# Patient Record
Sex: MALE | Race: WHITE | NOT HISPANIC OR LATINO | URBAN - METROPOLITAN AREA
[De-identification: names, ages, dates, MRNs, and addresses within clinical notes are randomized per-mention and may not be internally consistent; named-entity substitution may affect disease eponyms.]

---

## 2017-06-07 ENCOUNTER — INPATIENT (INPATIENT)
Facility: HOSPITAL | Age: 67
LOS: 8 days | Discharge: HOME | DRG: 300 | End: 2017-06-16
Attending: INTERNAL MEDICINE | Admitting: INTERNAL MEDICINE
Payer: MEDICARE

## 2017-06-07 VITALS — WEIGHT: 315 LBS

## 2017-06-07 DIAGNOSIS — R06.02 SHORTNESS OF BREATH: ICD-10-CM

## 2017-06-07 LAB
ALBUMIN SERPL ELPH-MCNC: 3.8 G/DL — SIGNIFICANT CHANGE UP (ref 3.3–5)
ALP SERPL-CCNC: 102 U/L — SIGNIFICANT CHANGE UP (ref 40–120)
ALT FLD-CCNC: 22 U/L RC — SIGNIFICANT CHANGE UP (ref 10–45)
ANION GAP SERPL CALC-SCNC: 16 MMOL/L — SIGNIFICANT CHANGE UP (ref 5–17)
APPEARANCE UR: CLEAR — SIGNIFICANT CHANGE UP
APTT BLD: 27.7 SEC — SIGNIFICANT CHANGE UP (ref 27.5–37.4)
AST SERPL-CCNC: 21 U/L — SIGNIFICANT CHANGE UP (ref 10–40)
BASE EXCESS BLDV CALC-SCNC: -0.9 MMOL/L — SIGNIFICANT CHANGE UP (ref -2–2)
BASOPHILS # BLD AUTO: 0 K/UL — SIGNIFICANT CHANGE UP (ref 0–0.2)
BASOPHILS NFR BLD AUTO: 0.1 % — SIGNIFICANT CHANGE UP (ref 0–2)
BILIRUB SERPL-MCNC: 0.2 MG/DL — SIGNIFICANT CHANGE UP (ref 0.2–1.2)
BILIRUB UR-MCNC: NEGATIVE — SIGNIFICANT CHANGE UP
BUN SERPL-MCNC: 23 MG/DL — SIGNIFICANT CHANGE UP (ref 7–23)
CA-I SERPL-SCNC: 1.2 MMOL/L — SIGNIFICANT CHANGE UP (ref 1.12–1.3)
CALCIUM SERPL-MCNC: 8.7 MG/DL — SIGNIFICANT CHANGE UP (ref 8.4–10.5)
CHLORIDE BLDV-SCNC: 107 MMOL/L — SIGNIFICANT CHANGE UP (ref 96–108)
CHLORIDE SERPL-SCNC: 103 MMOL/L — SIGNIFICANT CHANGE UP (ref 96–108)
CO2 BLDV-SCNC: 25 MMOL/L — SIGNIFICANT CHANGE UP (ref 22–30)
CO2 SERPL-SCNC: 22 MMOL/L — SIGNIFICANT CHANGE UP (ref 22–31)
COLOR SPEC: YELLOW — SIGNIFICANT CHANGE UP
COMMENT - URINE: SIGNIFICANT CHANGE UP
CREAT SERPL-MCNC: 0.97 MG/DL — SIGNIFICANT CHANGE UP (ref 0.5–1.3)
DIFF PNL FLD: ABNORMAL
EOSINOPHIL # BLD AUTO: 0.1 K/UL — SIGNIFICANT CHANGE UP (ref 0–0.5)
EOSINOPHIL NFR BLD AUTO: 1.1 % — SIGNIFICANT CHANGE UP (ref 0–6)
EPI CELLS # UR: SIGNIFICANT CHANGE UP /HPF
GAS PNL BLDV: 139 MMOL/L — SIGNIFICANT CHANGE UP (ref 136–145)
GAS PNL BLDV: SIGNIFICANT CHANGE UP
GAS PNL BLDV: SIGNIFICANT CHANGE UP
GLUCOSE BLDV-MCNC: 129 MG/DL — HIGH (ref 70–99)
GLUCOSE SERPL-MCNC: 129 MG/DL — HIGH (ref 70–99)
GLUCOSE UR QL: NEGATIVE — SIGNIFICANT CHANGE UP
HCO3 BLDV-SCNC: 24 MMOL/L — SIGNIFICANT CHANGE UP (ref 21–29)
HCT VFR BLD CALC: 38.6 % — LOW (ref 39–50)
HCT VFR BLDA CALC: 40 % — SIGNIFICANT CHANGE UP (ref 39–50)
HGB BLD CALC-MCNC: 13.2 G/DL — SIGNIFICANT CHANGE UP (ref 13–17)
HGB BLD-MCNC: 13.3 G/DL — SIGNIFICANT CHANGE UP (ref 13–17)
HYALINE CASTS # UR AUTO: ABNORMAL
INR BLD: 1.16 RATIO — SIGNIFICANT CHANGE UP (ref 0.88–1.16)
KETONES UR-MCNC: ABNORMAL
LACTATE BLDV-MCNC: 2.8 MMOL/L — HIGH (ref 0.7–2)
LEUKOCYTE ESTERASE UR-ACNC: ABNORMAL
LYMPHOCYTES # BLD AUTO: 1.7 K/UL — SIGNIFICANT CHANGE UP (ref 1–3.3)
LYMPHOCYTES # BLD AUTO: 17.5 % — SIGNIFICANT CHANGE UP (ref 13–44)
MCHC RBC-ENTMCNC: 34.4 GM/DL — SIGNIFICANT CHANGE UP (ref 32–36)
MCHC RBC-ENTMCNC: 34.4 PG — HIGH (ref 27–34)
MCV RBC AUTO: 100 FL — SIGNIFICANT CHANGE UP (ref 80–100)
MONOCYTES # BLD AUTO: 0.9 K/UL — SIGNIFICANT CHANGE UP (ref 0–0.9)
MONOCYTES NFR BLD AUTO: 9.3 % — SIGNIFICANT CHANGE UP (ref 2–14)
NEUTROPHILS # BLD AUTO: 7 K/UL — SIGNIFICANT CHANGE UP (ref 1.8–7.4)
NEUTROPHILS NFR BLD AUTO: 72.1 % — SIGNIFICANT CHANGE UP (ref 43–77)
NITRITE UR-MCNC: NEGATIVE — SIGNIFICANT CHANGE UP
NT-PROBNP SERPL-SCNC: 51 PG/ML — SIGNIFICANT CHANGE UP (ref 0–300)
PCO2 BLDV: 42 MMHG — SIGNIFICANT CHANGE UP (ref 35–50)
PH BLDV: 7.37 — SIGNIFICANT CHANGE UP (ref 7.35–7.45)
PH UR: 5.5 — SIGNIFICANT CHANGE UP (ref 5–8)
PLATELET # BLD AUTO: 216 K/UL — SIGNIFICANT CHANGE UP (ref 150–400)
PO2 BLDV: 61 MMHG — HIGH (ref 25–45)
POTASSIUM BLDV-SCNC: 4.2 MMOL/L — SIGNIFICANT CHANGE UP (ref 3.5–5)
POTASSIUM SERPL-MCNC: 4.8 MMOL/L — SIGNIFICANT CHANGE UP (ref 3.5–5.3)
POTASSIUM SERPL-SCNC: 4.8 MMOL/L — SIGNIFICANT CHANGE UP (ref 3.5–5.3)
PROT SERPL-MCNC: 8.2 G/DL — SIGNIFICANT CHANGE UP (ref 6–8.3)
PROT UR-MCNC: 100 MG/DL
PROTHROM AB SERPL-ACNC: 12.6 SEC — SIGNIFICANT CHANGE UP (ref 9.8–12.7)
RBC # BLD: 3.86 M/UL — LOW (ref 4.2–5.8)
RBC # FLD: 11.9 % — SIGNIFICANT CHANGE UP (ref 10.3–14.5)
RBC CASTS # UR COMP ASSIST: SIGNIFICANT CHANGE UP /HPF (ref 0–2)
SAO2 % BLDV: 89 % — HIGH (ref 67–88)
SODIUM SERPL-SCNC: 141 MMOL/L — SIGNIFICANT CHANGE UP (ref 135–145)
SP GR SPEC: 1.03 — SIGNIFICANT CHANGE UP (ref 1.01–1.02)
TROPONIN T SERPL-MCNC: <0.01 NG/ML — SIGNIFICANT CHANGE UP (ref 0–0.06)
UROBILINOGEN FLD QL: 1
WBC # BLD: 9.8 K/UL — SIGNIFICANT CHANGE UP (ref 3.8–10.5)
WBC # FLD AUTO: 9.8 K/UL — SIGNIFICANT CHANGE UP (ref 3.8–10.5)
WBC UR QL: SIGNIFICANT CHANGE UP /HPF (ref 0–5)

## 2017-06-07 PROCEDURE — 71010: CPT | Mod: 26

## 2017-06-07 PROCEDURE — 71275 CT ANGIOGRAPHY CHEST: CPT | Mod: 26

## 2017-06-07 PROCEDURE — 99284 EMERGENCY DEPT VISIT MOD MDM: CPT

## 2017-06-07 PROCEDURE — 93970 EXTREMITY STUDY: CPT | Mod: 26

## 2017-06-07 RX ORDER — CARBAMAZEPINE 200 MG
400 TABLET ORAL
Qty: 0 | Refills: 0 | Status: DISCONTINUED | OUTPATIENT
Start: 2017-06-07 | End: 2017-06-16

## 2017-06-07 RX ORDER — HEPARIN SODIUM 5000 [USP'U]/ML
10000 INJECTION INTRAVENOUS; SUBCUTANEOUS EVERY 6 HOURS
Qty: 0 | Refills: 0 | Status: DISCONTINUED | OUTPATIENT
Start: 2017-06-07 | End: 2017-06-10

## 2017-06-07 RX ORDER — FUROSEMIDE 40 MG
40 TABLET ORAL DAILY
Qty: 0 | Refills: 0 | Status: DISCONTINUED | OUTPATIENT
Start: 2017-06-07 | End: 2017-06-16

## 2017-06-07 RX ORDER — HEPARIN SODIUM 5000 [USP'U]/ML
10000 INJECTION INTRAVENOUS; SUBCUTANEOUS ONCE
Qty: 0 | Refills: 0 | Status: COMPLETED | OUTPATIENT
Start: 2017-06-07 | End: 2017-06-07

## 2017-06-07 RX ORDER — LEVETIRACETAM 250 MG/1
500 TABLET, FILM COATED ORAL
Qty: 0 | Refills: 0 | Status: DISCONTINUED | OUTPATIENT
Start: 2017-06-07 | End: 2017-06-16

## 2017-06-07 RX ORDER — LISINOPRIL 2.5 MG/1
10 TABLET ORAL DAILY
Qty: 0 | Refills: 0 | Status: DISCONTINUED | OUTPATIENT
Start: 2017-06-07 | End: 2017-06-16

## 2017-06-07 RX ORDER — CARBAMAZEPINE 200 MG
3 TABLET ORAL
Qty: 0 | Refills: 0 | COMMUNITY

## 2017-06-07 RX ORDER — ATORVASTATIN CALCIUM 80 MG/1
80 TABLET, FILM COATED ORAL AT BEDTIME
Qty: 0 | Refills: 0 | Status: DISCONTINUED | OUTPATIENT
Start: 2017-06-07 | End: 2017-06-16

## 2017-06-07 RX ORDER — HEPARIN SODIUM 5000 [USP'U]/ML
INJECTION INTRAVENOUS; SUBCUTANEOUS
Qty: 25000 | Refills: 0 | Status: DISCONTINUED | OUTPATIENT
Start: 2017-06-07 | End: 2017-06-09

## 2017-06-07 RX ORDER — HEPARIN SODIUM 5000 [USP'U]/ML
5000 INJECTION INTRAVENOUS; SUBCUTANEOUS EVERY 6 HOURS
Qty: 0 | Refills: 0 | Status: DISCONTINUED | OUTPATIENT
Start: 2017-06-07 | End: 2017-06-10

## 2017-06-07 RX ADMIN — HEPARIN SODIUM 10000 UNIT(S): 5000 INJECTION INTRAVENOUS; SUBCUTANEOUS at 18:28

## 2017-06-07 RX ADMIN — HEPARIN SODIUM 2400 UNIT(S)/HR: 5000 INJECTION INTRAVENOUS; SUBCUTANEOUS at 18:26

## 2017-06-07 NOTE — H&P ADULT - HISTORY OF PRESENT ILLNESS
67 y.o. male history of a CVA causing left sided paralysis over 10 years ago, DVT's in the past with PE's, recently stopped coumadin as recommended by his PCP which he was on for years coming in with worsening LLE pain where he has had DVT's in the past.  No chest pain, + SoB and palp.  No cough no fevers no chills.  Pt lives at home where he has 24 hour nursing care.  Any movement of the leg makes pain worse, nothing makes it better. 67 y.o. male history of a CVA causing left sided paralysis over 10 years ago, DVT's in the past with PE's, recently stopped coumadin as recommended by his PCP which he was on for years coming in with worsening LLE pain where he has had DVT's in the past.  No chest pain, + SoB and palp.  No cough no fevers no chills.  Pt lives at home where he has 24 hour nursing care.  Any movement of the leg makes pain worse, nothing makes it better. pt denies recent weight loss, loss of appetite pts family next to pts bedside 68 yo morbidly obese male history of a CVA causing left sided paralysis over 10 years ago, DVT's in the past with PE's, recently stopped coumadin as recommended by his PCP which he was on for years coming in with worsening LLE pain where he has had DVT's in the past.  No chest pain, + SoB and palp.  No cough no fevers no chills.  Pt lives at home where he has 24 hour nursing care.  Any movement of the leg makes pain worse, nothing makes it better. pt denies recent weight loss, loss of appetite pts family next to pts bedside

## 2017-06-07 NOTE — ED PROVIDER NOTE - PHYSICAL EXAMINATION
Egan:  General: No distress.  Mentation at baseline.   HEENT: WNL  Chest/Lungs: CTAB, No wheeze, No retractions, No increased work of breathing, Normal rate  Heart: S1S2 RRR, No M/R/G, Pules equal Bilaterally in upper and lower extremities distally  Abd: soft, NT/ND, No guarding, No rebound.  No hernias, no palpable masses.  Extrem: Lymphedema and discoloration of right LE.  Pulses intact.  Skin: No rash noted, warm dry.  Neuro:  Grossly normal.  No difficulty ambulating. No focal deficits.  Psychiatric: No evidence of delusions. No SI/HI.

## 2017-06-07 NOTE — ED ADULT NURSE NOTE - PMH
Aphasia  Expressive  CVA (Cerebral Vascular Accident)    Hemiplegia Affecting Dominant Side    Hypercholesteremia    Obesity    Pulmonary embolism    Seizure disorder

## 2017-06-07 NOTE — CONSULT NOTE ADULT - ASSESSMENT
? neuroendocrine tumor of pancreas, doubt adenoca with slow increase.  patient will liekly need eus, fna.  will get chromogranin a, ca 19-9 and review ct scan.  pt is morbidly obese, will speak to anesthesia re: plan

## 2017-06-07 NOTE — ED ADULT NURSE NOTE - PSH
Pulmonary embolism  wyatt filter  S/P cataract extraction and insertion of intraocular lens, right  9/16/2010

## 2017-06-07 NOTE — H&P ADULT - ATTENDING COMMENTS
I had extensive discussion with pt and pts son about pts current clinical status and management plan  all questions answered   evaluated pt around 7 pm on 6/7/17

## 2017-06-07 NOTE — ED PROVIDER NOTE - PROGRESS NOTE DETAILS
Dr. Diaz Note: s/o from Dr. Egan pending u/s and ct scan.  U/s showing old clot R side and new DVT left leg, will start A/C, awaiting ct scan, pt will require admission for DVT, palpitations, dyspnea requiring A/C, possibly echo, and tele monitoring for 1-2 days.

## 2017-06-07 NOTE — ED PROVIDER NOTE - ENMT, MLM
Airway patent, Nasal mucosa clear. Mouth with normal mucosa. Throat has no vesicles, no oropharyngeal exudates and uvula is midline.  Poor dentition

## 2017-06-07 NOTE — ED ADULT NURSE REASSESSMENT NOTE - NS ED NURSE REASSESS COMMENT FT1
pt weighed on stroke stretcher and returned to bed ct scan notified of results pt pending ct scan family at bedside

## 2017-06-07 NOTE — CONSULT NOTE ADULT - SUBJECTIVE AND OBJECTIVE BOX
Patient is a 67y Male     Patient is a 67y old  Male who presents with a chief complaint of leg pain, found to have pancreatic mass on ct scan which has grown in few year time    HPI:      PAST MEDICAL & SURGICAL HISTORY:  Pulmonary embolism  Obesity  Seizure disorder  Hemiplegia Affecting Dominant Side  Aphasia: Expressive  Hypercholesteremia  CVA (Cerebral Vascular Accident)  Pulmonary embolism: wyatt filter  S/P cataract extraction and insertion of intraocular lens, right: 9/16/2010  No Past Surgical History      MEDICATIONS  (STANDING):  heparin  Infusion. Unit(s)/Hr IV Continuous <Continuous>      Allergies    No Known Allergies    Intolerances        SOCIAL HISTORY:  Denies ETOh,Smoking,     FAMILY HISTORY:      REVIEW OF SYSTEMS:    CONSTITUTIONAL: No weakness, fevers or chills  EYES/ENT: No visual changes;  No vertigo or throat pain   NECK: No pain or stiffness  RESPIRATORY: No cough, wheezing, hemoptysis; No shortness of breath  CARDIOVASCULAR: No chest pain or palpitations  GASTROINTESTINAL: No abdominal or epigastric pain. No nausea, vomiting, or hematemesis; No diarrhea or constipation. No melena or hematochezia.  GENITOURINARY: No dysuria, frequency or hematuria  NEUROLOGICAL: No numbness or weakness  SKIN: No itching, burning, rashes, or lesions   All other review of systems is negative unless indicated above.    VITAL:  T(C): , Max: 37.3 (06-07 @ 17:44)  T(F): , Max: 99.1 (06-07 @ 17:44)  HR: 58  BP: 159/66  BP(mean): --  RR: 18  SpO2: 100%  Wt(kg): --    I and O's:      Weight (kg): 192.2 (06-07 @ 17:30)    PHYSICAL EXAM:    Constitutional: NAD  HEENT: PERRLA,   Neck: No JVD  Respiratory: CTA B/L  Cardiovascular: S1 and S2  Gastrointestinal: BS+, soft, NT/ND  Extremities: No peripheral edema  Neurological: A/O x 3, no focal deficits  Psychiatric: Normal mood, normal affect  : No [place]  Skin: No rashes  Access: Not applicable  Back: No CVA tenderness    LABS:                        13.3   9.8   )-----------( 216      ( 07 Jun 2017 12:32 )             38.6     06-07    141  |  103  |  23  ----------------------------<  129<H>  4.8   |  22  |  0.97    Ca    8.7      07 Jun 2017 12:32    TPro  8.2  /  Alb  3.8  /  TBili  0.2  /  DBili  x   /  AST  21  /  ALT  22  /  AlkPhos  102  06-07          RADIOLOGY & ADDITIONAL STUDIES:    panc mass body pancreas

## 2017-06-07 NOTE — ED PROVIDER NOTE - OBJECTIVE STATEMENT
67 y.o. male history of a CVA causing left sided paralysis over 10 years ago, DVT's in the past with PE's, recently stopped coumadin as recommended by his PCP which he was on for years coming in with worsening LLE pain where he has had DVT's in the past.  No chest pain, + SoB and palp.  No cough no fevers no chills.  Pt lives at home where he has 24 hour nursing care.  Any movement of the leg makes pain worse, nothing makes it better. 67 y.o. male history of a CVA causing left sided paralysis over 10 years ago, DVT's in the past with PE's, recently stopped coumadin as recommended by his PCP which he was on for years coming in with worsening LLE pain where he has had DVT's in the past.  No chest pain, + SoB and palp.  No cough no fevers no chills.  Pt lives at home where he has 24 hour nursing care.  Any movement of the leg makes pain worse, nothing makes it better.    Egan:  As above

## 2017-06-07 NOTE — ED ADULT NURSE REASSESSMENT NOTE - NS ED NURSE REASSESS COMMENT FT1
pt pending bariatric bed for weight of patient  ct scan called they want to be called for when weight is obtained

## 2017-06-07 NOTE — ED ADULT NURSE NOTE - OBJECTIVE STATEMENT
pt 66 y/o morbidly obese homebound via ems from home pt cva hx dvt hx statesnot on coumadin for months stateshe has blood clot to lower left extremity pt with right side paresis lower extremities swollen discoloration darkened pigment to skin right heel cracked sinus rythem with PAC's on moniter pt accompanied by son to er pt no chest pain skin cool to touch lower extremites cool to touch pt with transport called and charge desk for bariatric bed pt greater than 500lbs labs sent as ordered

## 2017-06-07 NOTE — ED PROVIDER NOTE - CARE PLAN
Principal Discharge DX:	Shortness of breath  Secondary Diagnosis:	Deep vein thrombosis (DVT) of both lower extremities, unspecified chronicity, unspecified vein

## 2017-06-07 NOTE — H&P ADULT - SKIN COMMENTS
dry scaly skin over rt foot , fungal toe nails+, chronic venous stasis changes +, left foot erythematous

## 2017-06-08 DIAGNOSIS — G40.909 EPILEPSY, UNSPECIFIED, NOT INTRACTABLE, WITHOUT STATUS EPILEPTICUS: ICD-10-CM

## 2017-06-08 DIAGNOSIS — K86.9 DISEASE OF PANCREAS, UNSPECIFIED: ICD-10-CM

## 2017-06-08 DIAGNOSIS — I82.403 ACUTE EMBOLISM AND THROMBOSIS OF UNSPECIFIED DEEP VEINS OF LOWER EXTREMITY, BILATERAL: ICD-10-CM

## 2017-06-08 DIAGNOSIS — I82.409 ACUTE EMBOLISM AND THROMBOSIS OF UNSPECIFIED DEEP VEINS OF UNSPECIFIED LOWER EXTREMITY: ICD-10-CM

## 2017-06-08 DIAGNOSIS — Z01.811 ENCOUNTER FOR PREPROCEDURAL RESPIRATORY EXAMINATION: ICD-10-CM

## 2017-06-08 LAB
ALBUMIN SERPL ELPH-MCNC: 3.6 G/DL — SIGNIFICANT CHANGE UP (ref 3.3–5)
ALP SERPL-CCNC: 100 U/L — SIGNIFICANT CHANGE UP (ref 40–120)
ALT FLD-CCNC: 17 U/L — SIGNIFICANT CHANGE UP (ref 10–45)
ANION GAP SERPL CALC-SCNC: 14 MMOL/L — SIGNIFICANT CHANGE UP (ref 5–17)
APTT BLD: 116.8 SEC — HIGH (ref 27.5–37.4)
APTT BLD: 187.4 SEC — CRITICAL HIGH (ref 27.5–37.4)
APTT BLD: 67.5 SEC — HIGH (ref 27.5–37.4)
APTT BLD: 74.5 SEC — HIGH (ref 27.5–37.4)
AST SERPL-CCNC: 18 U/L — SIGNIFICANT CHANGE UP (ref 10–40)
BASOPHILS # BLD AUTO: 0.04 K/UL — SIGNIFICANT CHANGE UP (ref 0–0.2)
BASOPHILS NFR BLD AUTO: 0.5 % — SIGNIFICANT CHANGE UP (ref 0–2)
BILIRUB SERPL-MCNC: 0.3 MG/DL — SIGNIFICANT CHANGE UP (ref 0.2–1.2)
BUN SERPL-MCNC: 17 MG/DL — SIGNIFICANT CHANGE UP (ref 7–23)
CALCIUM SERPL-MCNC: 8.7 MG/DL — SIGNIFICANT CHANGE UP (ref 8.4–10.5)
CANCER AG125 SERPL-ACNC: 12 U/ML — SIGNIFICANT CHANGE UP
CEA SERPL-MCNC: 2.7 NG/ML — SIGNIFICANT CHANGE UP (ref 0–3.8)
CHLORIDE SERPL-SCNC: 100 MMOL/L — SIGNIFICANT CHANGE UP (ref 96–108)
CO2 SERPL-SCNC: 23 MMOL/L — SIGNIFICANT CHANGE UP (ref 22–31)
CREAT SERPL-MCNC: 0.81 MG/DL — SIGNIFICANT CHANGE UP (ref 0.5–1.3)
EOSINOPHIL # BLD AUTO: 0.17 K/UL — SIGNIFICANT CHANGE UP (ref 0–0.5)
EOSINOPHIL NFR BLD AUTO: 2.2 % — SIGNIFICANT CHANGE UP (ref 0–6)
GLUCOSE SERPL-MCNC: 119 MG/DL — HIGH (ref 70–99)
HCT VFR BLD CALC: 34.1 % — LOW (ref 39–50)
HCT VFR BLD CALC: 36.5 % — LOW (ref 39–50)
HGB BLD-MCNC: 11.8 G/DL — LOW (ref 13–17)
HGB BLD-MCNC: 11.9 G/DL — LOW (ref 13–17)
IMM GRANULOCYTES NFR BLD AUTO: 1.2 % — SIGNIFICANT CHANGE UP (ref 0–1.5)
LYMPHOCYTES # BLD AUTO: 1.78 K/UL — SIGNIFICANT CHANGE UP (ref 1–3.3)
LYMPHOCYTES # BLD AUTO: 22.9 % — SIGNIFICANT CHANGE UP (ref 13–44)
MCHC RBC-ENTMCNC: 31.6 PG — SIGNIFICANT CHANGE UP (ref 27–34)
MCHC RBC-ENTMCNC: 32.6 GM/DL — SIGNIFICANT CHANGE UP (ref 32–36)
MCHC RBC-ENTMCNC: 34.5 PG — HIGH (ref 27–34)
MCHC RBC-ENTMCNC: 34.6 GM/DL — SIGNIFICANT CHANGE UP (ref 32–36)
MCV RBC AUTO: 96.8 FL — SIGNIFICANT CHANGE UP (ref 80–100)
MCV RBC AUTO: 99.8 FL — SIGNIFICANT CHANGE UP (ref 80–100)
MONOCYTES # BLD AUTO: 0.7 K/UL — SIGNIFICANT CHANGE UP (ref 0–0.9)
MONOCYTES NFR BLD AUTO: 9 % — SIGNIFICANT CHANGE UP (ref 2–14)
NEUTROPHILS # BLD AUTO: 4.98 K/UL — SIGNIFICANT CHANGE UP (ref 1.8–7.4)
NEUTROPHILS NFR BLD AUTO: 64.2 % — SIGNIFICANT CHANGE UP (ref 43–77)
PLATELET # BLD AUTO: 207 K/UL — SIGNIFICANT CHANGE UP (ref 150–400)
PLATELET # BLD AUTO: 235 K/UL — SIGNIFICANT CHANGE UP (ref 150–400)
POTASSIUM SERPL-MCNC: 4.5 MMOL/L — SIGNIFICANT CHANGE UP (ref 3.5–5.3)
POTASSIUM SERPL-SCNC: 4.5 MMOL/L — SIGNIFICANT CHANGE UP (ref 3.5–5.3)
PROT SERPL-MCNC: 7.9 G/DL — SIGNIFICANT CHANGE UP (ref 6–8.3)
RBC # BLD: 3.42 M/UL — LOW (ref 4.2–5.8)
RBC # BLD: 3.77 M/UL — LOW (ref 4.2–5.8)
RBC # FLD: 11.8 % — SIGNIFICANT CHANGE UP (ref 10.3–14.5)
RBC # FLD: 13.4 % — SIGNIFICANT CHANGE UP (ref 10.3–14.5)
SODIUM SERPL-SCNC: 137 MMOL/L — SIGNIFICANT CHANGE UP (ref 135–145)
WBC # BLD: 7.76 K/UL — SIGNIFICANT CHANGE UP (ref 3.8–10.5)
WBC # BLD: 8.9 K/UL — SIGNIFICANT CHANGE UP (ref 3.8–10.5)
WBC # FLD AUTO: 7.76 K/UL — SIGNIFICANT CHANGE UP (ref 3.8–10.5)
WBC # FLD AUTO: 8.9 K/UL — SIGNIFICANT CHANGE UP (ref 3.8–10.5)

## 2017-06-08 PROCEDURE — 74000: CPT | Mod: 26

## 2017-06-08 PROCEDURE — 99223 1ST HOSP IP/OBS HIGH 75: CPT

## 2017-06-08 PROCEDURE — 93306 TTE W/DOPPLER COMPLETE: CPT | Mod: 26

## 2017-06-08 PROCEDURE — 99222 1ST HOSP IP/OBS MODERATE 55: CPT

## 2017-06-08 RX ADMIN — HEPARIN SODIUM 2000 UNIT(S)/HR: 5000 INJECTION INTRAVENOUS; SUBCUTANEOUS at 02:31

## 2017-06-08 RX ADMIN — LEVETIRACETAM 500 MILLIGRAM(S): 250 TABLET, FILM COATED ORAL at 14:33

## 2017-06-08 RX ADMIN — HEPARIN SODIUM 1700 UNIT(S)/HR: 5000 INJECTION INTRAVENOUS; SUBCUTANEOUS at 09:24

## 2017-06-08 RX ADMIN — ATORVASTATIN CALCIUM 80 MILLIGRAM(S): 80 TABLET, FILM COATED ORAL at 22:08

## 2017-06-08 RX ADMIN — Medication 400 MILLIGRAM(S): at 00:58

## 2017-06-08 RX ADMIN — Medication 400 MILLIGRAM(S): at 14:34

## 2017-06-08 RX ADMIN — LEVETIRACETAM 500 MILLIGRAM(S): 250 TABLET, FILM COATED ORAL at 00:58

## 2017-06-08 RX ADMIN — HEPARIN SODIUM 1700 UNIT(S)/HR: 5000 INJECTION INTRAVENOUS; SUBCUTANEOUS at 17:03

## 2017-06-08 RX ADMIN — LISINOPRIL 10 MILLIGRAM(S): 2.5 TABLET ORAL at 06:01

## 2017-06-08 RX ADMIN — HEPARIN SODIUM 0 UNIT(S)/HR: 5000 INJECTION INTRAVENOUS; SUBCUTANEOUS at 01:30

## 2017-06-08 RX ADMIN — Medication 40 MILLIGRAM(S): at 06:01

## 2017-06-08 RX ADMIN — HEPARIN SODIUM 1700 UNIT(S)/HR: 5000 INJECTION INTRAVENOUS; SUBCUTANEOUS at 23:59

## 2017-06-08 NOTE — CHART NOTE - NSCHARTNOTEFT_GEN_A_CORE
Notified by RN aptt 187.4. Serum sample not drawn from same extremity as heparin infusion. No signs of bleeding. Pt's weight correct. Rate correct. RN to follow nomogram. Will recheck aptt in 6 hours

## 2017-06-08 NOTE — PROGRESS NOTE ADULT - SUBJECTIVE AND OBJECTIVE BOX
cc : worsening lower ext pain     SUBJECTIVE / OVERNIGHT EVENTS: denies chest pain, shortness of breath, nausea,v , weakness in lower ext        MEDICATIONS  (STANDING):  heparin  Infusion. Unit(s)/Hr IV Continuous <Continuous>  lisinopril 10milliGRAM(s) Oral daily  carBAMazepine XR Tablet 400milliGRAM(s) Oral two times a day  levETIRAcetam 500milliGRAM(s) Oral two times a day  atorvastatin 80milliGRAM(s) Oral at bedtime  furosemide    Tablet 40milliGRAM(s) Oral daily    MEDICATIONS  (PRN):  heparin  Injectable 89673Yrzb(s) IV Push every 6 hours PRN For aPTT less than 40  heparin  Injectable 5000Unit(s) IV Push every 6 hours PRN For aPTT between 40 - 57      Vital Signs Last 24 Hrs  T(C): 36.6, Max: 37.3 (-07 @ 17:44)  HR: 60 (58 - 71)  BP: 114/72 (114/72 - 159/66)  RR: 17 (17 - 20)  SpO2: 96% (96% - 100%)  Wt(kg): --  CAPILLARY BLOOD GLUCOSE    I&O's Summary    I & Os for current day (as of 2017 11:20)  =============================================  IN: 0 ml / OUT: 500 ml / NET: -500 ml      Constitutional: No fever, fatigue or weight loss.  Skin: No rash.  Eyes: No recent vision problems or eye pain.  ENT: No congestion, ear pain, or sore throat.  Endocrine: No thyroid problems.  Cardiovascular: No chest pain or palpation.  Respiratory: No cough, shortness of breath, congestion, or wheezing.  Gastrointestinal: No abdominal pain, nausea, vomiting, or diarrhea.  Genitourinary: No dysuria.  Musculoskeletal: No joint swelling.  Neurologic: No headache.    PHYSICAL EXAM:  GENERAL: morbidly obese    EYES: EOMI, PERRLA, conjunctiva and sclera clear  NECK: Supple, No JVD  CHEST/LUNG: dec breath sounds at bases  HEART: Regular rate and rhythm;   ABDOMEN: Soft, Nontender, mild distenson+; Bowel sounds present  EXTREMITIES:  2+ Peripheral Pulses, No clubbing, cyanosis, or edema  Neuro : alert, awake calm , coopertive  SKIN: erythematous  rodolfo lower ext , chronic venous stasis changes +, fungal toe nails, dry scaly skin over rt foot            LABS:      137  |  100  |  17  ----------------------------<  119<H>  4.5   |  23  |  0.81    Ca    8.7      2017 09:41    TPro  7.9  /  Alb  3.6  /  TBili  0.3  /  DBili      /  AST  18  /  ALT  17  /  AlkPhos  100      Creatinine Trend: 0.81 <--, 0.97 <--                        11.8   8.9   )-----------( 207      ( 2017 00:41 )             34.1     Urine Studies:  Urinalysis Basic - ( 2017 14:02 )    Color: Yellow / Appearance: Clear / S.030 / pH:   Gluc:  / Ketone: Trace  / Bili: Negative / Urobili: 1   Blood:  / Protein: 100 mg/dL / Nitrite: Negative   Leuk Esterase: Trace / RBC: 3-5 /HPF / WBC 6-10 /HPF   Sq Epi:  / Non Sq Epi: OCC /HPF / Bacteria:         CARDIAC MARKERS ( 2017 12:32 )  x     / <0.01 ng/mL / x     / x     / x            LIVER FUNCTIONS - ( 2017 09:41 )  Alb: 3.6 g/dL / Pro: 7.9 g/dL / ALK PHOS: 100 U/L / ALT: 17 U/L / AST: 18 U/L / GGT: x           PT/INR - ( 2017 12:32 )   PT: 12.6 sec;   INR: 1.16 ratio         PTT - ( 2017 08:33 )  PTT:116.8 sec                                                                                                                                                                         ext , chronic venous changes +  LABS:                        11.8   8.9   )-----------( 207      ( 2017 00:41 )             34.1         137  |  100  |  17  ----------------------------<  119<H>  4.5   |  23  |  0.81    Ca    8.7      2017 09:41    TPro  7.9  /  Alb  3.6  /  TBili  0.3  /  DBili  x   /  AST  18  /  ALT  17  /  AlkPhos  100  06-08    PT/INR - ( 2017 12:32 )   PT: 12.6 sec;   INR: 1.16 ratio         PTT - ( 2017 08:33 )  PTT:116.8 sec  CARDIAC MARKERS ( 2017 12:32 )  x     / <0.01 ng/mL / x     / x     / x          Urinalysis Basic - ( 2017 14:02 )    Color: Yellow / Appearance: Clear / S.030 / pH: x  Gluc: x / Ketone: Trace  / Bili: Negative / Urobili: 1   Blood: x / Protein: 100 mg/dL / Nitrite: Negative   Leuk Esterase: Trace / RBC: 3-5 /HPF / WBC 6-10 /HPF   Sq Epi: x / Non Sq Epi: OCC /HPF / Bacteria: x        RADIOLOGY & ADDITIONAL TESTS:    Imaging Personally Reviewed:    Consultant(s) Notes Reviewed:      Care Discussed with Consultants/Other Providers:

## 2017-06-08 NOTE — CONSULT NOTE ADULT - PROBLEM SELECTOR RECOMMENDATION 2
-c/w heparin gtt with eventual coumadin bridge after procedure -c/w heparin gtt with eventual coumadin bridge after procedure  - may hold heparin for procedure  - Abd flat plate to confirm [place] placement

## 2017-06-08 NOTE — CONSULT NOTE ADULT - ASSESSMENT
68 y/o M with history of provoked PE-DVT 2nd tobacco use and immobility s/p Merrick filter (90s), CVA with residual R hemiplegia (2001), primarily bedbound now. Admitted with increased LE pain after recently stopping coumadin. Found to have acute on chronic DVT. 68 y/o M with history of provoked PE-DVT 2nd tobacco use and immobility s/p D Lo filter (90s), CVA with residual R hemiplegia (2001), seizure history, primarily bedbound now. Admitted with increased LE pain after recently stopping coumadin. Found to have acute on chronic DVT. No evidence of PE. Incidentally noted pancreatic lesion being recommended for EUS. Called for pre-procedural assessment.

## 2017-06-08 NOTE — CONSULT NOTE ADULT - SUBJECTIVE AND OBJECTIVE BOX
PULMONARY CONSULT    Reason for consultation:     Initial HPI on admission:  HPI:  66 yo morbidly obese male history of a CVA causing left sided paralysis over 10 years ago, DVT's in the past with PE's, recently stopped coumadin as recommended by his PCP which he was on for years coming in with worsening LLE pain where he has had DVT's in the past.  No chest pain, + SoB and palp.  No cough no fevers no chills.  Pt lives at home where he has 24 hour nursing care.  Any movement of the leg makes pain worse, nothing makes it better. pt denies recent weight loss, loss of appetite pts family next to pts bedside (2017 23:38)      BRIEF HOSPITAL COURSE: ***    PAST MEDICAL & SURGICAL HISTORY:  Pulmonary embolism  Obesity  Seizure disorder  Hemiplegia Affecting Dominant Side  Aphasia: Expressive  Hypercholesteremia  CVA (Cerebral Vascular Accident)  Pulmonary embolism: wyatt filter  S/P cataract extraction and insertion of intraocular lens, right: 2010  No Past Surgical History    Allergies    No Known Allergies    Intolerances      FAMILY HISTORY:  No pertinent family history in first degree relatives    Social history:     Review of Systems:  CONSTITUTIONAL: No fever, chills, or fatigue  EYES: No eye pain, visual disturbances, or discharge  ENMT:  No difficulty hearing, tinnitus, vertigo; No sinus or throat pain  NECK: No pain or stiffness  RESPIRATORY: Per above  CARDIOVASCULAR: No chest pain, palpitations, dizziness, or leg swelling  GASTROINTESTINAL: No abdominal or epigastric pain. No nausea, vomiting, or hematemesis; No diarrhea or constipation. No melena or hematochezia.  GENITOURINARY: No dysuria, frequency, hematuria, or incontinence  NEUROLOGICAL: No headaches, memory loss, loss of strength, numbness, or tremors  SKIN: No itching, burning, rashes, or lesions   MUSCULOSKELETAL: No joint pain or swelling; No muscle, back, or extremity pain  PSYCHIATRIC: No depression, anxiety, mood swings, or difficulty sleeping      Medications:  MEDICATIONS  (STANDING):  heparin  Infusion. Unit(s)/Hr IV Continuous <Continuous>  lisinopril 10milliGRAM(s) Oral daily  carBAMazepine XR Tablet 400milliGRAM(s) Oral two times a day  levETIRAcetam 500milliGRAM(s) Oral two times a day  atorvastatin 80milliGRAM(s) Oral at bedtime  furosemide    Tablet 40milliGRAM(s) Oral daily    MEDICATIONS  (PRN):  heparin  Injectable 27516Gaev(s) IV Push every 6 hours PRN For aPTT less than 40  heparin  Injectable 5000Unit(s) IV Push every 6 hours PRN For aPTT between 40 - 57      vent settings      Vital Signs Last 24 Hrs  T(C): 36.6, Max: 37.3 ( @ 17:44)  T(F): 97.9, Max: 99.1 ( @ 17:44)  HR: 60 (58 - 66)  BP: 114/72 (114/72 - 159/66)  BP(mean): --  RR: 17 (17 - 18)  SpO2: 96% (96% - 100%)        I & Os for 24h ending  @ 07:00  =============================================  IN: 0 ml / OUT: 500 ml / NET: -500 ml        LABS:                        11.9   7.76  )-----------( 235      ( 2017 09:40 )             36.5     06-    137  |  100  |  17  ----------------------------<  119<H>  4.5   |  23  |  0.81    Ca    8.7      2017 09:41    TPro  7.9  /  Alb  3.6  /  TBili  0.3  /  DBili  x   /  AST  18  /  ALT  17  /  AlkPhos  100  06-08      CARDIAC MARKERS ( 2017 12:32 )  x     / <0.01 ng/mL / x     / x     / x          CAPILLARY BLOOD GLUCOSE    PT/INR - ( 2017 12:32 )   PT: 12.6 sec;   INR: 1.16 ratio         PTT - ( 2017 08:33 )  PTT:116.8 sec  Urinalysis Basic - ( 2017 14:02 )    Color: Yellow / Appearance: Clear / S.030 / pH: x  Gluc: x / Ketone: Trace  / Bili: Negative / Urobili: 1   Blood: x / Protein: 100 mg/dL / Nitrite: Negative   Leuk Esterase: Trace / RBC: 3-5 /HPF / WBC 6-10 /HPF   Sq Epi: x / Non Sq Epi: OCC /HPF / Bacteria: x        Serum Pro-Brain Natriuretic Peptide: 51 pg/mL ( @ 12:32)        CULTURES:        Physical Examination:    General: No acute distress.      HEENT: Pupils equal, reactive to light.  Symmetric.    PULM: Decreased BS bilaterally, no significant sputum production    CVS: Regular rate and rhythm, no murmurs, rubs, or gallops    ABD: Soft, nondistended, nontender, normoactive bowel sounds, obese    EXT: Venous stasis skin changes, +3 bilateral edema R>L    SKIN: Warm and well perfused, no rashes noted.    NEURO: Alert, oriented, interactive, R hemiplegia    RADIOLOGY REVIEWED  CXR: grossly limited    CTA Chest: No central, lobar, segmental PE, exam limited and unable to eval for subsegmental PE. Bibasilar atelectasis. Stable 7mm RUL Nodule-present since .    TTE:   Conclusions:  1. Normal left ventricular internal dimensions and wall  thicknesses.  2. Endocardial visualization enhanced with intravenous  injection of echo contrast (Definity). Endocardium not well  visualized despite the use of echo contrast. Grossly normal  LV function. Suboptimal images precludes accurage  evaluation for regional wall motion abnormalities.  3. The right ventricle is notwell visualized; grossly  normal right ventricular systolic function. PULMONARY CONSULT    Reason for consultation:     Initial HPI on admission:  HPI:  66 yo morbidly obese male history of a CVA causing left sided paralysis over 10 years ago, DVT's in the past with PE's, recently stopped coumadin as recommended by his PCP which he was on for years coming in with worsening LLE pain where he has had DVT's in the past.  No chest pain, + SoB and palp.  No cough no fevers no chills.  Pt lives at home where he has 24 hour nursing care.  Any movement of the leg makes pain worse, nothing makes it better. pt denies recent weight loss, loss of appetite pts family next to pts bedside (2017 23:38)        PAST MEDICAL & SURGICAL HISTORY:  Pulmonary embolism  Obesity  Seizure disorder  Hemiplegia Affecting Dominant Side  Aphasia: Expressive  Hypercholesteremia  CVA (Cerebral Vascular Accident)  Pulmonary embolism: wyatt filter  S/P cataract extraction and insertion of intraocular lens, right: 2010  No Past Surgical History    Allergies    No Known Allergies    Intolerances      FAMILY HISTORY:  No pertinent family history in first degree relatives    Social history: Former Smoker, 25pack year hx, quit s    Review of Systems:  CONSTITUTIONAL: No fever, chills, or fatigue  EYES: No eye pain, visual disturbances, or discharge  ENMT:  No difficulty hearing, tinnitus, vertigo; No sinus or throat pain  NECK: No pain or stiffness  RESPIRATORY: Per above  CARDIOVASCULAR: No chest pain, palpitations, dizziness, or leg swelling  GASTROINTESTINAL: No abdominal or epigastric pain. No nausea, vomiting, or hematemesis; No diarrhea or constipation. No melena or hematochezia.  GENITOURINARY: No dysuria, frequency, hematuria, or incontinence  NEUROLOGICAL: No headaches, memory loss, loss of strength, numbness, or tremors  SKIN: No itching, burning, rashes, or lesions   MUSCULOSKELETAL: No joint pain or swelling; No muscle, back, or extremity pain  PSYCHIATRIC: No depression, anxiety, mood swings, or difficulty sleeping      Medications:  MEDICATIONS  (STANDING):  heparin  Infusion. Unit(s)/Hr IV Continuous <Continuous>  lisinopril 10milliGRAM(s) Oral daily  carBAMazepine XR Tablet 400milliGRAM(s) Oral two times a day  levETIRAcetam 500milliGRAM(s) Oral two times a day  atorvastatin 80milliGRAM(s) Oral at bedtime  furosemide    Tablet 40milliGRAM(s) Oral daily    MEDICATIONS  (PRN):  heparin  Injectable 66640Hkdm(s) IV Push every 6 hours PRN For aPTT less than 40  heparin  Injectable 5000Unit(s) IV Push every 6 hours PRN For aPTT between 40 - 57    Vital Signs Last 24 Hrs  T(C): 36.6, Max: 37.3 ( @ 17:44)  T(F): 97.9, Max: 99.1 ( @ 17:44)  HR: 60 (58 - 66)  BP: 114/72 (114/72 - 159/66)  BP(mean): --  RR: 17 (17 - 18)  SpO2: 96% (96% - 100%) on RA        I & Os for 24h ending  @ 07:00  =============================================  IN: 0 ml / OUT: 500 ml / NET: -500 ml        LABS:                        11.9   7.76  )-----------( 235      ( 2017 09:40 )             36.5     06-08    137  |  100  |  17  ----------------------------<  119<H>  4.5   |  23  |  0.81    Ca    8.7      2017 09:41    TPro  7.9  /  Alb  3.6  /  TBili  0.3  /  DBili  x   /  AST  18  /  ALT  17  /  AlkPhos  100  06-08      CARDIAC MARKERS ( 2017 12:32 )  x     / <0.01 ng/mL / x     / x     / x          CAPILLARY BLOOD GLUCOSE    PT/INR - ( 2017 12:32 )   PT: 12.6 sec;   INR: 1.16 ratio         PTT - ( 2017 08:33 )  PTT:116.8 sec  Urinalysis Basic - ( 2017 14:02 )    Color: Yellow / Appearance: Clear / S.030 / pH: x  Gluc: x / Ketone: Trace  / Bili: Negative / Urobili: 1   Blood: x / Protein: 100 mg/dL / Nitrite: Negative   Leuk Esterase: Trace / RBC: 3-5 /HPF / WBC 6-10 /HPF   Sq Epi: x / Non Sq Epi: OCC /HPF / Bacteria: x        Serum Pro-Brain Natriuretic Peptide: 51 pg/mL ( @ 12:32)        CULTURES:        Physical Examination:    General: No acute distress.      HEENT: Pupils equal, reactive to light.  Symmetric.    PULM: Decreased BS bilaterally, no significant sputum production    CVS: Regular rate and rhythm, no murmurs, rubs, or gallops    ABD: Soft, nondistended, nontender, normoactive bowel sounds, obese    EXT: Venous stasis skin changes, +3 bilateral edema R>L    SKIN: Warm and well perfused, no rashes noted.    NEURO: Alert, oriented, interactive, R hemiplegia    RADIOLOGY REVIEWED  CXR: grossly limited    CTA Chest: No central, lobar, segmental PE, exam limited and unable to eval for subsegmental PE. Bibasilar atelectasis. Stable 7mm RUL Nodule-present since .    TTE:   Conclusions:  1. Normal left ventricular internal dimensions and wall  thicknesses.  2. Endocardial visualization enhanced with intravenous  injection of echo contrast (Definity). Endocardium not well  visualized despite the use of echo contrast. Grossly normal  LV function. Suboptimal images precludes accurage  evaluation for regional wall motion abnormalities.  3. The right ventricle is notwell visualized; grossly  normal right ventricular systolic function.    VA Duplex: IMPRESSION: There is acute almost occlusive DVT affecting the left   femoral and common femoral veins.  Superficial thrombophlebitis affects the left greater saphenous vein. PULMONARY CONSULT    Reason for consultation:     Initial HPI on admission:  HPI:  66 yo morbidly obese male history of a CVA causing right sided paralysis in , DVT-PE with Wyatt filter (s), recently stopped coumadin as recommended by his PCP which he was on for years coming in with worsening LLE pain where he has had DVT's in the past.  No chest pain, + SoB and palp.  No cough no fevers no chills.  Pt lives at home where he has 24 hour nursing care.  Any movement of the leg makes pain worse, nothing makes it better. pt denies recent weight loss, loss of appetite pts family next to pts bedside (2017 23:38)        PAST MEDICAL & SURGICAL HISTORY:  Pulmonary embolism  Obesity  Seizure disorder  Hemiplegia Affecting Dominant Side  Aphasia: Expressive  Hypercholesteremia  CVA (Cerebral Vascular Accident)  Pulmonary embolism: wyatt filter  S/P cataract extraction and insertion of intraocular lens, right: 2010  No Past Surgical History    Allergies    No Known Allergies    Intolerances      FAMILY HISTORY:  No pertinent family history in first degree relatives    Social history: Former Smoker, 25pack year hx, quit     Review of Systems:  CONSTITUTIONAL: No fever, chills, or fatigue  EYES: No eye pain, visual disturbances, or discharge  ENMT:  No difficulty hearing, tinnitus, vertigo; No sinus or throat pain  NECK: No pain or stiffness  RESPIRATORY: Per above  CARDIOVASCULAR: No chest pain, palpitations, dizziness, or leg swelling  GASTROINTESTINAL: No abdominal or epigastric pain. No nausea, vomiting, or hematemesis; No diarrhea or constipation. No melena or hematochezia.  GENITOURINARY: No dysuria, frequency, hematuria, or incontinence  NEUROLOGICAL: No headaches, memory loss, loss of strength, numbness, or tremors  SKIN: No itching, burning, rashes, or lesions   MUSCULOSKELETAL: No joint pain or swelling; No muscle, back, or extremity pain  PSYCHIATRIC: No depression, anxiety, mood swings, or difficulty sleeping      Medications:  MEDICATIONS  (STANDING):  heparin  Infusion. Unit(s)/Hr IV Continuous <Continuous>  lisinopril 10milliGRAM(s) Oral daily  carBAMazepine XR Tablet 400milliGRAM(s) Oral two times a day  levETIRAcetam 500milliGRAM(s) Oral two times a day  atorvastatin 80milliGRAM(s) Oral at bedtime  furosemide    Tablet 40milliGRAM(s) Oral daily    MEDICATIONS  (PRN):  heparin  Injectable 36076Yvtv(s) IV Push every 6 hours PRN For aPTT less than 40  heparin  Injectable 5000Unit(s) IV Push every 6 hours PRN For aPTT between 40 - 57    Vital Signs Last 24 Hrs  T(C): 36.6, Max: 37.3 ( @ 17:44)  T(F): 97.9, Max: 99.1 ( @ 17:44)  HR: 60 (58 - 66)  BP: 114/72 (114/72 - 159/66)  BP(mean): --  RR: 17 (17 - 18)  SpO2: 96% (96% - 100%) on RA        I & Os for 24h ending  @ 07:00  =============================================  IN: 0 ml / OUT: 500 ml / NET: -500 ml        LABS:                        11.9   7.76  )-----------( 235      ( 2017 09:40 )             36.5     06-    137  |  100  |  17  ----------------------------<  119<H>  4.5   |  23  |  0.81    Ca    8.7      2017 09:41    TPro  7.9  /  Alb  3.6  /  TBili  0.3  /  DBili  x   /  AST  18  /  ALT  17  /  AlkPhos  100  06-08      CARDIAC MARKERS ( 2017 12:32 )  x     / <0.01 ng/mL / x     / x     / x          CAPILLARY BLOOD GLUCOSE    PT/INR - ( 2017 12:32 )   PT: 12.6 sec;   INR: 1.16 ratio         PTT - ( 2017 08:33 )  PTT:116.8 sec  Urinalysis Basic - ( 2017 14:02 )    Color: Yellow / Appearance: Clear / S.030 / pH: x  Gluc: x / Ketone: Trace  / Bili: Negative / Urobili: 1   Blood: x / Protein: 100 mg/dL / Nitrite: Negative   Leuk Esterase: Trace / RBC: 3-5 /HPF / WBC 6-10 /HPF   Sq Epi: x / Non Sq Epi: OCC /HPF / Bacteria: x        Serum Pro-Brain Natriuretic Peptide: 51 pg/mL ( @ 12:32)        CULTURES:        Physical Examination:    General: No acute distress.      HEENT: Pupils equal, reactive to light.  Symmetric.    PULM: Decreased BS bilaterally, no significant sputum production    CVS: Regular rate and rhythm, no murmurs, rubs, or gallops    ABD: Soft, nondistended, nontender, normoactive bowel sounds, obese    EXT: Venous stasis skin changes, +3 bilateral edema R>L    SKIN: Warm and well perfused, no rashes noted.    NEURO: Alert, oriented, interactive, R hemiplegia    RADIOLOGY REVIEWED  CXR: grossly limited    CTA Chest: No central, lobar, segmental PE, exam limited and unable to eval for subsegmental PE. Bibasilar atelectasis. Stable 7mm RUL Nodule-present since .    TTE:   Conclusions:  1. Normal left ventricular internal dimensions and wall  thicknesses.  2. Endocardial visualization enhanced with intravenous  injection of echo contrast (Definity). Endocardium not well  visualized despite the use of echo contrast. Grossly normal  LV function. Suboptimal images precludes accurage  evaluation for regional wall motion abnormalities.  3. The right ventricle is notwell visualized; grossly  normal right ventricular systolic function.    VA Duplex: IMPRESSION: There is acute almost occlusive DVT affecting the left   femoral and common femoral veins.  Superficial thrombophlebitis affects the left greater saphenous vein. PULMONARY CONSULT    Reason for consultation:     Initial HPI on admission:  HPI:  68 yo morbidly obese male history of a CVA causing right sided paralysis in , DVT-PE with Wyatt filter (), recently stopped coumadin as recommended by his PCP which he was on for years coming in with worsening LE pain, found to have acute on chronic DVT.  He was incidentally noted to have pancreatic lesion. Patient complains of intermittent SOB which is not new, denies CP, palpitations, cough, hemoptysis, fever, chills. He has never had an issue with his breathing. Has not had any prior intubations.       PAST MEDICAL & SURGICAL HISTORY:  Pulmonary embolism  Obesity  Seizure disorder  Hemiplegia Affecting Dominant Side  Aphasia: Expressive  Hypercholesteremia  CVA (Cerebral Vascular Accident)  Pulmonary embolism: wyatt filter  S/P cataract extraction and insertion of intraocular lens, right: 2010  No Past Surgical History    Allergies    No Known Allergies    Intolerances      FAMILY HISTORY:  No pertinent family history in first degree relatives    Social history: Former Smoker, 25pack year hx, quit s    Review of Systems:  CONSTITUTIONAL: No fever, chills, or fatigue  EYES: No eye pain, visual disturbances, or discharge  ENMT:  No difficulty hearing, tinnitus, vertigo; No sinus or throat pain  NECK: No pain or stiffness  RESPIRATORY: Per above  CARDIOVASCULAR: Intermittent CP  GASTROINTESTINAL: No abdominal or epigastric pain. No nausea, vomiting, or hematemesis; No diarrhea or constipation. No melena or hematochezia.  GENITOURINARY: No dysuria, frequency, hematuria, or incontinence  NEUROLOGICAL: Memory impairment is at baseline, no headache  SKIN: No itching, burning, rashes, or lesions   MUSCULOSKELETAL: Bilateral LE pain L>R  PSYCHIATRIC: Expressive aphasia       Medications:  MEDICATIONS  (STANDING):  heparin  Infusion. Unit(s)/Hr IV Continuous <Continuous>  lisinopril 10milliGRAM(s) Oral daily  carBAMazepine XR Tablet 400milliGRAM(s) Oral two times a day  levETIRAcetam 500milliGRAM(s) Oral two times a day  atorvastatin 80milliGRAM(s) Oral at bedtime  furosemide    Tablet 40milliGRAM(s) Oral daily    MEDICATIONS  (PRN):  heparin  Injectable 38056Hsdi(s) IV Push every 6 hours PRN For aPTT less than 40  heparin  Injectable 5000Unit(s) IV Push every 6 hours PRN For aPTT between 40 - 57    Vital Signs Last 24 Hrs  T(C): 36.6, Max: 37.3 ( @ 17:44)  T(F): 97.9, Max: 99.1 ( @ 17:44)  HR: 60 (58 - 66)  BP: 114/72 (114/72 - 159/66)  BP(mean): --  RR: 17 (17 - 18)  SpO2: 96% (96% - 100%) on RA        I & Os for 24h ending  @ 07:00  =============================================  IN: 0 ml / OUT: 500 ml / NET: -500 ml        LABS:                        11.9   7.76  )-----------( 235      ( 2017 09:40 )             36.5         137  |  100  |  17  ----------------------------<  119<H>  4.5   |  23  |  0.81    Ca    8.7      2017 09:41    TPro  7.9  /  Alb  3.6  /  TBili  0.3  /  DBili  x   /  AST  18  /  ALT  17  /  AlkPhos  100  06-08      CARDIAC MARKERS ( 2017 12:32 )  x     / <0.01 ng/mL / x     / x     / x          CAPILLARY BLOOD GLUCOSE    PT/INR - ( 2017 12:32 )   PT: 12.6 sec;   INR: 1.16 ratio         PTT - ( 2017 08:33 )  PTT:116.8 sec  Urinalysis Basic - ( 2017 14:02 )    Color: Yellow / Appearance: Clear / S.030 / pH: x  Gluc: x / Ketone: Trace  / Bili: Negative / Urobili: 1   Blood: x / Protein: 100 mg/dL / Nitrite: Negative   Leuk Esterase: Trace / RBC: 3-5 /HPF / WBC 6-10 /HPF   Sq Epi: x / Non Sq Epi: OCC /HPF / Bacteria: x        Serum Pro-Brain Natriuretic Peptide: 51 pg/mL ( @ 12:32)        CULTURES:        Physical Examination:    General: No acute distress.      HEENT: Pupils equal, reactive to light.  Symmetric.    PULM: Decreased BS bilaterally, no significant sputum production    CVS: Regular rate and rhythm, no murmurs, rubs, or gallops    ABD: Soft, nondistended, nontender, normoactive bowel sounds, obese    EXT: Venous stasis skin changes, +3 bilateral edema R>L    SKIN: Warm and well perfused, no rashes noted.    NEURO: Alert, oriented, interactive, R hemiplegia    RADIOLOGY REVIEWED  CXR: grossly limited    CTA Chest: No central, lobar, segmental PE, exam limited and unable to eval for subsegmental PE. Bibasilar atelectasis. Stable 7mm RUL Nodule-present since .    TTE:   Conclusions:  1. Normal left ventricular internal dimensions and wall  thicknesses.  2. Endocardial visualization enhanced with intravenous  injection of echo contrast (Definity). Endocardium not well  visualized despite the use of echo contrast. Grossly normal  LV function. Suboptimal images precludes accurage  evaluation for regional wall motion abnormalities.  3. The right ventricle is notwell visualized; grossly  normal right ventricular systolic function.    VA Duplex: IMPRESSION: There is acute almost occlusive DVT affecting the left   femoral and common femoral veins.  Superficial thrombophlebitis affects the left greater saphenous vein.

## 2017-06-08 NOTE — PROVIDER CONTACT NOTE (CRITICAL VALUE NOTIFICATION) - SITUATION
Pt admitted to hospital for increased swelling and dusky color b/l lower extremities. Pt also have dvt in b/l legs

## 2017-06-08 NOTE — PROGRESS NOTE ADULT - SUBJECTIVE AND OBJECTIVE BOX
Patient is a 67y Male     Patient is a 67y old  Male who presents with a chief complaint of Right foot swelling (07 Jun 2017 20:46)      HPI:  67 y.o. male history of a CVA causing left sided paralysis over 10 years ago, DVT's in the past with PE's, recently stopped coumadin as recommended by his PCP which he was on for years coming in with worsening LLE pain where he has had DVT's in the past.  No chest pain, + SoB and palp.  No cough no fevers no chills.  Pt lives at home where he has 24 hour nursing care.  Any movement of the leg makes pain worse, nothing makes it better. (07 Jun 2017 23:38)      PAST MEDICAL & SURGICAL HISTORY:  Pulmonary embolism  Obesity  Seizure disorder  Hemiplegia Affecting Dominant Side  Aphasia: Expressive  Hypercholesteremia  CVA (Cerebral Vascular Accident)  Pulmonary embolism: wyatt filter  S/P cataract extraction and insertion of intraocular lens, right: 9/16/2010  No Past Surgical History      MEDICATIONS  (STANDING):  heparin  Infusion. Unit(s)/Hr IV Continuous <Continuous>  lisinopril 10milliGRAM(s) Oral daily  carBAMazepine XR Tablet 400milliGRAM(s) Oral two times a day  levETIRAcetam 500milliGRAM(s) Oral two times a day  atorvastatin 80milliGRAM(s) Oral at bedtime  furosemide    Tablet 40milliGRAM(s) Oral daily      Allergies    No Known Allergies    Intolerances        SOCIAL HISTORY:  Denies ETOh,Smoking,     FAMILY HISTORY:      REVIEW OF SYSTEMS:    CONSTITUTIONAL: No weakness, fevers or chills  EYES/ENT: No visual changes;  No vertigo or throat pain   NECK: No pain or stiffness  RESPIRATORY: No cough, wheezing, hemoptysis; No shortness of breath  CARDIOVASCULAR: No chest pain or palpitations  GASTROINTESTINAL: No abdominal or epigastric pain. No nausea, vomiting, or hematemesis; No diarrhea or constipation. No melena or hematochezia.  GENITOURINARY: No dysuria, frequency or hematuria  NEUROLOGICAL: No numbness or weakness  SKIN: No itching, burning, rashes, or lesions   All other review of systems is negative unless indicated above.    VITAL:  T(C): , Max: 37.3 (06-07 @ 17:44)  T(F): , Max: 99.1 (06-07 @ 17:44)  HR: 60  BP: 114/72  BP(mean): --  RR: 17  SpO2: 96%  Wt(kg): --    I and O's:    I & Os for current day (as of 06-08 @ 08:35)  =============================================  IN: 0 ml / OUT: 500 ml / NET: -500 ml    Height (cm): 180.3 (06-07 @ 20:40)  Weight (kg): 192.2 (06-07 @ 17:30)  BMI (kg/m2): 59.1 (06-07 @ 20:40)  BSA (m2): 2.9 (06-07 @ 20:40)    PHYSICAL EXAM:    Constitutional: NAD  HEENT: PERRLA,   Neck: No JVD  Respiratory: CTA B/L  Cardiovascular: S1 and S2  Gastrointestinal: BS+, soft, NT/ND  Extremities: No peripheral edema  Neurological: A/O x 3, no focal deficits  Psychiatric: Normal mood, normal affect  : No [place]  Skin: No rashes  Access: Not applicable  Back: No CVA tenderness    LABS:                        11.8   8.9   )-----------( 207      ( 08 Jun 2017 00:41 )             34.1     06-07    141  |  103  |  23  ----------------------------<  129<H>  4.8   |  22  |  0.97    Ca    8.7      07 Jun 2017 12:32    TPro  8.2  /  Alb  3.8  /  TBili  0.2  /  DBili  x   /  AST  21  /  ALT  22  /  AlkPhos  102  06-07          RADIOLOGY & ADDITIONAL STUDIES:      dvt

## 2017-06-08 NOTE — CONSULT NOTE ADULT - SUBJECTIVE AND OBJECTIVE BOX
GENERAL SURGERY CONSULT NOTE  --------------------------------------------------------------------------------------------    Patient is a 67y old  Male who presents with a chief complaint of worsening left lower ext pain (2017 23:38)      HPI: 66 yo morbidly obese male history of a CVA causing left sided paralysis over 10 years ago, DVT's in the past with PE's, recently stopped coumadin as recommended by his PCP which he was on for years coming in with worsening LLE pain where he has had DVT's in the past.  No chest pain. + shortness of breath and +dyspnea on exertion. No cough no fevers no chills.  Pt lives at home where he has 24 hour nursing care.  Any movement of the leg makes pain worse, nothing makes it better. pt denies recent weight loss, loss of appetite pts family next to pts bedside.     On radiologic evaluation for work-up of SOB, CTscan of chest, abdomen and pelvis were obtained. Scan was significant for 2.5X3 cm mass in the body of the pancreas. This same mass had previously been noted on a scan in 2010. At that time the mass measured 2X1.5cm.     Patient denies fevers/chills, denies lightheadedness/dizziness, denies SOB/chest pain, denies nausea/vomiting, denies constipation/diarrhea.  ROS: 10-system review is otherwise negative except HPI above.      PAST MEDICAL & SURGICAL HISTORY:  Pulmonary embolism  Obesity  Seizure disorder  Hemiplegia Affecting Dominant Side  Aphasia: Expressive  Hypercholesteremia  CVA (Cerebral Vascular Accident)  Pulmonary embolism: wyatt filter  S/P cataract extraction and insertion of intraocular lens, right: 2010  No Past Surgical History    FAMILY HISTORY:  No pertinent family history in first degree relatives    ALLERGIES: No Known Allergies      HOME MEDICATIONS:levETIRAcetam 500 mg oral tablet: 1 tab(s) orally 2 times a day, Last Dose Taken:    · 	furosemide 40 mg oral tablet: 1 tab(s) orally once a day, Last Dose Taken:    · 	atorvastatin 80 mg oral tablet: 1 tab(s) orally once a day, Last Dose Taken:    · 	potassium chloride 20 mEq oral tablet, extended release: 1 tab(s) orally once a day, Last Dose Taken:    · 	carBAMazepine 400 mg oral tablet, extended release: 3 tab(s) orally 2 times a day *see comments*, Last Dose Taken:    · 	Vitamin D2 50,000 intl units (1.25 mg) oral capsule: 1 cap(s) orally once a week, Last Dose Taken:    · 	Nuedexta 20 mg-10 mg oral capsule: 1 cap(s) orally 2 times a day, Last Dose Taken:    · 	lisinopril 10 mg oral tablet: 1 tab(s) orally once a day, Last Dose Taken:        CURRENT MEDICATIONS  MEDICATIONS (STANDING): heparin  Infusion. Unit(s)/Hr IV Continuous <Continuous>  lisinopril 10milliGRAM(s) Oral daily  carBAMazepine XR Tablet 400milliGRAM(s) Oral two times a day  levETIRAcetam 500milliGRAM(s) Oral two times a day  atorvastatin 80milliGRAM(s) Oral at bedtime  furosemide    Tablet 40milliGRAM(s) Oral daily    MEDICATIONS (PRN):heparin  Injectable 63695Flkp(s) IV Push every 6 hours PRN For aPTT less than 40  heparin  Injectable 5000Unit(s) IV Push every 6 hours PRN For aPTT between 40 - 57    --------------------------------------------------------------------------------------------    Vitals:   T(C): 36.9, Max: 36.9 (06-08 @ 20:13)  HR: 66 (60 - 66)  BP: 120/64 (114/72 - 120/64)  BP(mean): --  RR: 18 (17 - 18)  SpO2: 96% (96% - 96%)  Wt(kg): --  CAPILLARY BLOOD GLUCOSE    CAPILLARY BLOOD GLUCOSE    I & Os for 24h ending 06-08 @ 07:00  =============================================  IN:    Total IN: 0 ml  ---------------------------------------------  OUT:    Voided: 500 ml    Total OUT: 500 ml  ---------------------------------------------  Total NET: -500 ml    I & Os for current day (as of  @ 21:38)  =============================================  IN:    Oral Fluid: 950 ml    Total IN: 950 ml  ---------------------------------------------  OUT:    Voided: 1745 ml    Total OUT: 1745 ml  ---------------------------------------------  Total NET: -795 ml    Height (cm): 180.3 ( @ 20:40)  Weight (kg): 192.2 ( @ 17:30)  BMI (kg/m2): 59.1 ( @ 20:40)  BSA (m2): 2.9 ( @ 20:40)    PHYSICAL EXAM:  General: NAD, Lying in bed comfortably, severely obese body habitus  Neuro: A+Ox3  HEENT: NC/AT, EOMI  Neck: Soft, supple  Cardio: RRR, nml S1/S2  Resp: Good effort, CTA b/l  Thorax: No chest wall tenderness  Breast: No lesions/masses, no drainage  GI/Abd: Soft, NT/ND, no rebound/guarding, no masses palpated  Vascular: B/L lower extremities with severe venous stasis changes and hemosiderin deposition.  Lymphatic/Nodes: No palpable lymphadenopathy  Musculoskeletal: LLE weakness  --------------------------------------------------------------------------------------------    LABS  CBC ( @ 09:40)                              11.9<L>                         7.76    )----------------(  235        64.2  % Neutrophils, 22.9  % Lymphocytes, ANC: 4.98                                36.5<L>  CBC ( 00:41)                              11.8<L>                         8.9     )----------------(  207        --    % Neutrophils, --    % Lymphocytes, ANC: --                                  34.1<L>    BMP ( 09:41)             137     |  100     |  17    		Ca++ --      Ca 8.7                ---------------------------------( 119<H>		Mg --                 4.5     |  23      |  0.81  			Ph --      BMP ( @ 12:32)             141     |  103     |  23    		Ca++ --      Ca 8.7                ---------------------------------( 129<H>		Mg --                 4.8     |  22      |  0.97  			Ph --        LFTs ( 09:41)      TPro 7.9 / Alb 3.6 / TBili 0.3 / DBili -- / AST 18 / ALT 17 / AlkPhos 100  LFTs ( 12:32)      TPro 8.2 / Alb 3.8 / TBili 0.2 / DBili -- / AST 21 / ALT 22 / AlkPhos 102    Coags ( @ 15:32)  aPTT 74.5<H> / INR -- / PT --  Coags ( @ 08:33)  aPTT 116.8<H> / INR -- / PT --    Cardiac Markers ( @ 12:32)     Trop: <0.01 -- / CKMB: -- / CK: --    ABG ( @ 12:32)      /  /  /  /  / %     Lactate:   2.8<H>    VBG ( @ 12:32)     7.37 / 42 / 61<H> / 24 / -0.9 / 89<H>%    --------------------------------------------------------------------------------------------    MICROBIOLOGY  Urinalysis ( @ 14:02):     Color: Yellow / Appearance: Clear / S.030 / pH: 5.5 / Gluc: Negative / Ketones: Trace<!> / Bili: Negative / Urobili: 1<!> / Protein :100<!> / Nitrites: Negative / Leuk.Est: Trace<!> / RBC: 3-5 / WBC: 6-10 / Sq Epi:  / Non Sq Epi: OCC / Bacteria    Few Mucus Strands      --------------------------------------------------------------------------------------------    IMAGING  CT:   MPRESSION:   1.  No central, lobar, or segmental pulmonary embolus. Evaluation of the   subsegmental branches is limited secondary to body habitus and motion   artifact.  2.  Soft tissue density pancreatic body lesion measuring 2.5 x 2.9 x 3.3   cmwith interval increase in size since 2010 worrisome for   neoplastic etiology. GI consultation is recommended.    ASSESSMENT: Patient is a 67y old m with Pancreatic body mass    PLAN:  -Recommend evaluation of tumor markers: CA-19-9 and CEA  -Patient would benefit from GI consultation for endoscopy/biopsy of mass for definitive diagnosis  -Patient is very poor surgical candidate secondary to his multiple comorbidities. This was discussed with the patient as well as his son at the bedside by Dr. Ontiveros, as well as myself.  -Will follow  -Discussed with Attending Dr. Ontiveros.

## 2017-06-08 NOTE — PROGRESS NOTE ADULT - ASSESSMENT
patient with pancreatic mass, ? net.  will reivew ct with radioloyg.  chromogranin a and ca 19-9 sent will need eus fna but need off heparin.  need hematology input.  may benefit from filter?

## 2017-06-08 NOTE — CONSULT NOTE ADULT - ASSESSMENT
67 y.o. male history of a CVA with left hemiparesis, DVT's in the past with PE's, morbid obesity, sz d/o, expressive aphasia.  Presents with acute DVT having had warfarin dc 2w ago, and with pancreatic mass susp for neoplasm.    -no acute ischemia  -no signif arrhythmias noted, cont tele monitoring  -edema with venous stasis, can cont po lasix  -panc mass, for further eval per dr iverson, gi  -heparin gtt, eventual resumption of coumadin pending his clinical course  -echo to eval lv fxn prior to any invasive procedures  -will follow

## 2017-06-08 NOTE — CONSULT NOTE ADULT - SUBJECTIVE AND OBJECTIVE BOX
[facility]         Yan Loaiza, Carly, Jen, Tomy Azevedo        [phone] (office)    CHIEF COMPLAINT: Patient is a 67y old  Male who presents with a chief complaint of edema and pain in foot    HPI:  67 y.o. male history of a CVA with left hemiparesis, DVT's in the past with PE's, morbid obesity, sz d/o, expressive aphasia.  Was on warfarin until about 2 weeks ago when it was stopped for unclear reasons.  He presented with progressive edema and pain of the lle.  Found with acute dvt, and admitted.  On CTPA found to have a pancreatic mass which will require addl evaluation  Evaluated by Dr. Patricia of [facility] for the dvt, cardiology consultation is now being obtained.    At baseline he is bedbound.  He has chronic edema.  He denies angina and sxs suggestive of hemodyn sig arrhythmia.        PAST MEDICAL & SURGICAL HISTORY:  Pulmonary embolism  Obesity  Seizure disorder  Hemiplegia Affecting Dominant Side  Aphasia: Expressive  Hypercholesteremia  CVA (Cerebral Vascular Accident)  Pulmonary embolism: wyatt filter  S/P cataract extraction and insertion of intraocular lens, right: 9/16/2010  No Past Surgical History      SOCIAL HISTORY: No active tobacco, alcohol or illicit drug use    FAMILY HISTORY:   nc    Outpatient medications:    MEDICATIONS  (STANDING):  heparin  Infusion. Unit(s)/Hr IV Continuous <Continuous>  lisinopril 10milliGRAM(s) Oral daily  carBAMazepine XR Tablet 400milliGRAM(s) Oral two times a day  levETIRAcetam 500milliGRAM(s) Oral two times a day  atorvastatin 80milliGRAM(s) Oral at bedtime  furosemide    Tablet 40milliGRAM(s) Oral daily    MEDICATIONS  (PRN):  heparin  Injectable 40318Pmjr(s) IV Push every 6 hours PRN For aPTT less than 40  heparin  Injectable 5000Unit(s) IV Push every 6 hours PRN For aPTT between 40 - 57      Allergies    No Known Allergies    Intolerances        REVIEW OF SYSTEMS: Is negative for eye, ENT, GI, , allergic, dermatologic, musculoskeletal and neurologic, except as described above.    VITAL SIGNS:   Vital Signs Last 24 Hrs  T(C): 36.6, Max: 37.3 (06-07 @ 17:44)  T(F): 97.9, Max: 99.1 (06-07 @ 17:44)  HR: 60 (58 - 71)  BP: 114/72 (114/72 - 159/66)  BP(mean): --  RR: 17 (17 - 20)  SpO2: 96% (96% - 100%)    I&O's Summary    I & Os for current day (as of 08 Jun 2017 10:23)  =============================================  IN: 0 ml / OUT: 500 ml / NET: -500 ml      PHYSICAL EXAM:    Constitutional: NAD, awake and alert, obese  Eyes:  EOMI, no oral cyanosis, conjunctivae clear, anicteric.  Pulmonary: Non-labored, breath sounds are clear bilaterally, no wheezing, rales or rhonchi  Cardiovascular:  regular S1 and S2, no murmurs, rubs, gallops or clicks  Gastrointestinal: Bowel Sound  Lymph 2+ peripheral edema, hyperpigmentation c/w chr saray stasis  Neurological: Alert, strength and sensitivity are grossly intact  Psych:  Mood & affect appropriate .    LABS: All Labs Reviewed:                        11.8   8.9   )-----------( 207      ( 08 Jun 2017 00:41 )             34.1                         13.3   9.8   )-----------( 216      ( 07 Jun 2017 12:32 )             38.6     08 Jun 2017 09:41    137    |  100    |  17     ----------------------------<  119    4.5     |  23     |  0.81   07 Jun 2017 12:32    141    |  103    |  23     ----------------------------<  129    4.8     |  22     |  0.97     Ca    8.7        08 Jun 2017 09:41  Ca    8.7        07 Jun 2017 12:32    TPro  7.9    /  Alb  3.6    /  TBili  0.3    /  DBili  x      /  AST  18     /  ALT  17     /  AlkPhos  100    08 Jun 2017 09:41  TPro  8.2    /  Alb  3.8    /  TBili  0.2    /  DBili  x      /  AST  21     /  ALT  22     /  AlkPhos  102    07 Jun 2017 12:32    PT/INR - ( 07 Jun 2017 12:32 )   PT: 12.6 sec;   INR: 1.16 ratio         PTT - ( 08 Jun 2017 08:33 )  PTT:116.8 sec  CARDIAC MARKERS ( 07 Jun 2017 12:32 )  x     / <0.01 ng/mL / x     / x     / x          Blood Culture:   06-07 @ 12:32  Pro Bnp 51        RADIOLOGY:    EXAM:  CT ANGIO CHEST (W)AW IC                            *** ADDENDUM 06/07/2017  ***    These findings were discussed with Dr. WALTER Hamm at 6/7/2017 7:29 PM by   Dr. Munoz with read back confirmation.      *** END OF ADDENDUM 06/07/2017  ***      PROCEDURE DATE:  06/07/2017            INTERPRETATION:  CLINICAL INFORMATION: Palpitations and leg pain. History   of PE recently taken off of anticoagulation.    COMPARISON: CT chest February 5, 2010     PROCEDURE:   CTA of the Chest was performed withintravenous contrast.  90 ml of Omnipaque 350 was injected intravenously. 10 ml were discarded.  Sagittal and coronal reformats were performed as well as 3D   Reconstructions.    FINDINGS:    CHEST:     LUNGS AND LARGE AIRWAYS: Patent central airways. Right upper lobe nodule   (8:58) measures 7 mm, unchanged from the prior study.  PLEURA: No pleural effusion.  VESSELS: There is no central, lobar, or segmental pulmonary embolus.   Evaluation of the subsegmental branches is limited secondary to body   habitus and motion artifact. Atherosclerotic calcifications of the aorta.  HEART: Heart size is normal.No pericardial effusion.  MEDIASTINUM AND WALTER: No lymphadenopathy.  CHEST WALL AND LOWER NECK: Within normal limits.  VISUALIZED UPPER ABDOMEN:Fatty atrophy of the pancreas. Indeterminant an   ill-defined soft tissue density pancreatic body lesion measuring 2.5 x   2.9 x 3.3 cm with interval increase in size since February 5, 2010   measured about 2 x 1.5 cm.  BONES: Within normal limits.    IMPRESSION:   1.  No central, lobar, or segmental pulmonary embolus. Evaluation of the   subsegmental branches is limited secondary to body habitus and motion   artifact.  2.  Soft tissue density pancreatic body lesion measuring 2.5 x 2.9 x 3.3   cmwith interval increase in size since February 5, 2010 worrisome for   neoplastic etiology. GI consultation is recommended.                ***Please see the addendum at the top of this report. It may contain   additional important information or changes.****      LINDA MUNOZ M.D., RADIOLOGY RESIDENT  This document has been electronically signed.  ANUSHKA WINTERS M.D. ATTENDING RADIOLOGIST  This document has been electronically signed. Jun 7 2017  6:58PM  Addend:  ANUSHKA WINTERS M.D. ATTENDING RADIOLOGIST  This addendum was electronically signed on: Jun 7 2017  8:14PM.          EXAM:  DUPLEX SCAN EXT VEINS LOWER BI                            PROCEDURE DATE:  06/07/2017            INTERPRETATION:  History:On a prior examination dated 3/2/2011, a short   segment of nonocclusive thrombus was identified in the right common   femoral vein, IVC filter placed, recently anticoagulation medicines had   been halted, left lower extremity swelling, rule out DVT.    There is edema within the superficial soft tissues of the left lower   extremity.    On the right, there remains intraluminal filling defects similar in   appearance to those 6 years earlier, representing the residue of prior   DVT.    There is acute DVT, almost occlusive to flow, affecting the left femoral   and common femoral veins.    The left greater saphenous vein, a superficial vein, is also thrombosed.    The calf veins were not diagnostically imaged.    IMPRESSION: There is acute almost occlusive DVT affecting the left   femoral and common femoral veins.  Superficial thrombophlebitis affects the left greater saphenous vein.                    DONELL BOOTH M.D., ATTENDING RADIOLOGIST  This document has been electronically signed. Jun 7 2017  6:47PM                EKG: not avail for review

## 2017-06-08 NOTE — CONSULT NOTE ADULT - SUBJECTIVE AND OBJECTIVE BOX
Cardiology/Vascular Medicine Inpatient Consultation Note        HISTORY OF PRESENT ILLNESS:    Patient is a 66 yo man with history of CVA causing left sided paralysis over 10 years ago, DVT's in the past with PE's, recently stopped warfarin as recommended by his PCP which he was on for years coming in with worsening LLE pain where he has had DVT's in the past.  No chest pain, + SOB and palp.  No cough no fevers no chills.  Pt lives at home where he has 24 hour nursing care.  Any movement of the leg makes pain worse, nothing makes it better.    EXAM:  CT ANGIO CHEST (W)AW IC                            PROCEDURE DATE:  06/07/2017        INTERPRETATION:  CLINICAL INFORMATION: Palpitations and leg pain. History   of PE recently taken off of anticoagulation.    COMPARISON: CT chest February 5, 2010     PROCEDURE:   CTA of the Chest was performed withintravenous contrast.  90 ml of Omnipaque 350 was injected intravenously. 10 ml were discarded.  Sagittal and coronal reformats were performed as well as 3D   Reconstructions.    FINDINGS:    CHEST:     LUNGS AND LARGE AIRWAYS: Patent central airways. Right upper lobe nodule   (8:58) measures 7 mm, unchanged from the prior study.  PLEURA: No pleural effusion.  VESSELS: There is no central, lobar, or segmental pulmonary embolus.   Evaluation of the subsegmental branches is limited secondary to body   habitus and motion artifact. Atherosclerotic calcifications of the aorta.  HEART: Heart size is normal.No pericardial effusion.  MEDIASTINUM AND WALTER: No lymphadenopathy.  CHEST WALL AND LOWER NECK: Within normal limits.  VISUALIZED UPPER ABDOMEN:Fatty atrophy of the pancreas. Indeterminant an   ill-defined soft tissue density pancreatic body lesion measuring 2.5 x   2.9 x 3.3 cm with interval increase in size since February 5, 2010   measured about 2 x 1.5 cm.  BONES: Within normal limits.    IMPRESSION:   1.  No central, lobar, or segmental pulmonary embolus. Evaluation of the   subsegmental branches is limited secondary to body habitus and motion   artifact.  2.  Soft tissue density pancreatic body lesion measuring 2.5 x 2.9 x 3.3   cmwith interval increase in size since February 5, 2010 worrisome for   neoplastic etiology. GI consultation is recommended.      ***Please see the addendum at the top of this report. It may contain   additional important information or changes.****      LINDA MUNOZ M.D., RADIOLOGY RESIDENT  This document has been electronically signed.  ANUSHKA WINTERS M.D. ATTENDING RADIOLOGIST  This document has been electronically signed. Jun 7 2017  6:58PM  Addend:  ANUSHKA WINTERS M.D. ATTENDING RADIOLOGIST  This addendum was electronically signed on: Jun 7 2017  8:14PM.            EXAM:  DUPLEX SCAN EXT VEINS LOWER BI                            PROCEDURE DATE:  06/07/2017        INTERPRETATION:  History:On a prior examination dated 3/2/2011, a short   segment of nonocclusive thrombus was identified in the right common   femoral vein, IVC filter placed, recently anticoagulation medicines had   been halted, left lower extremity swelling, rule out DVT.    There is edema within the superficial soft tissues of the left lower   extremity.    On the right, there remains intraluminal filling defects similar in   appearance to those 6 years earlier, representing the residue of prior   DVT.    There is acute DVT, almost occlusive to flow, affecting the left femoral   and common femoral veins.    The left greater saphenous vein, a superficial vein, is also thrombosed.    The calf veins were not diagnostically imaged.    IMPRESSION: There is acute almost occlusive DVT affecting the left   femoral and common femoral veins.  Superficial thrombophlebitis affects the left greater saphenous vein.    Allergies    No Known Allergies      MEDICATIONS:  heparin  Infusion. Unit(s)/Hr IV Continuous <Continuous>  heparin  Injectable 21825Gddo(s) IV Push every 6 hours PRN  heparin  Injectable 5000Unit(s) IV Push every 6 hours PRN  lisinopril 10milliGRAM(s) Oral daily  furosemide    Tablet 40milliGRAM(s) Oral daily        carBAMazepine XR Tablet 400milliGRAM(s) Oral two times a day  levETIRAcetam 500milliGRAM(s) Oral two times a day      atorvastatin 80milliGRAM(s) Oral at bedtime        PAST MEDICAL & SURGICAL HISTORY:  Pulmonary embolism  Obesity  Seizure disorder  Hemiplegia Affecting Dominant Side  Aphasia: Expressive  Hypercholesteremia  CVA (Cerebral Vascular Accident)  Pulmonary embolism: wyatt filter  S/P cataract extraction and insertion of intraocular lens, right: 9/16/2010  No Past Surgical History      FAMILY HISTORY:      SOCIAL HISTORY:    [ ] Non-smoker  [ ] Smoker  [ ] Alcohol    REVIEW OF SYSTEMS:  CONSTITUTIONAL: No fever, weight loss, or fatigue  EYES: No eye pain, visual disturbances, or discharge  ENMT:  No difficulty hearing, tinnitus, vertigo; No sinus or throat pain  NECK: No pain or stiffness  RESPIRATORY: No cough, wheezing, chills or hemoptysis; No Shortness of Breath  CARDIOVASCULAR: No chest pain, palpitations, passing out, dizziness, or leg swelling  GASTROINTESTINAL: No abdominal or epigastric pain. No nausea, vomiting, or hematemesis; No diarrhea or constipation. No melena or hematochezia.  GENITOURINARY: No dysuria, frequency, hematuria, or incontinence  NEUROLOGICAL: No headaches, memory loss, loss of strength, numbness, or tremors  SKIN: No itching, burning, rashes, or lesions   LYMPH Nodes: No enlarged glands  ENDOCRINE: No heat or cold intolerance; No hair loss  MUSCULOSKELETAL: No joint pain or swelling; No muscle, back, or extremity pain  PSYCHIATRIC: No depression, anxiety, mood swings, or difficulty sleeping  HEME/LYMPH: No easy bruising, or bleeding gums  ALLERY AND IMMUNOLOGIC: No hives or eczema	      PHYSICAL EXAM:  T(C): 36.6, Max: 37.3 (06-07 @ 17:44)  HR: 60 (58 - 71)  BP: 114/72 (114/72 - 159/66)  RR: 17 (17 - 20)  SpO2: 96% (96% - 100%)  Wt(kg): --  I&O's Summary    I & Os for current day (as of 08 Jun 2017 06:13)  =============================================  IN: 0 ml / OUT: 500 ml / NET: -500 ml      Appearance: Normal	  HEENT:   Normal oral mucosa, PERRL, EOMI	  Lymphatic: No lymphadenopathy  Cardiovascular: Normal S1 S2, No JVD, No murmurs, No edema  Respiratory: Lungs clear to auscultation	  Psychiatry: A & O x 3, Mood & affect appropriate  Gastrointestinal:  Soft, Non-tender, + BS	  Skin: No rashes, No ecchymoses, No cyanosis	  Neurologic: Non-focal  Extremities: Normal range of motion, No clubbing, cyanosis or edema  Vascular: Peripheral pulses palpable 2+ bilaterally      LABS:	 	    CBC Full  -  ( 08 Jun 2017 00:41 )  WBC Count : 8.9 K/uL  Hemoglobin : 11.8 g/dL  Hematocrit : 34.1 %  Platelet Count - Automated : 207 K/uL  Mean Cell Volume : 99.8 fl  Mean Cell Hemoglobin : 34.5 pg  Mean Cell Hemoglobin Concentration : 34.6 gm/dL  Auto Neutrophil # : x  Auto Lymphocyte # : x  Auto Monocyte # : x  Auto Eosinophil # : x  Auto Basophil # : x  Auto Neutrophil % : x  Auto Lymphocyte % : x  Auto Monocyte % : x  Auto Eosinophil % : x  Auto Basophil % : x    06-07    141  |  103  |  23  ----------------------------<  129<H>  4.8   |  22  |  0.97    Ca    8.7      07 Jun 2017 12:32    TPro  8.2  /  Alb  3.8  /  TBili  0.2  /  DBili  x   /  AST  21  /  ALT  22  /  AlkPhos  102  06-07      proBNP: Serum Pro-Brain Natriuretic Peptide: 51 pg/mL (06-07 @ 12:32)    Lipid Profile:   HgA1c:   TSH:       CARDIAC MARKERS:            TELEMETRY: 	    ECG:   	  RADIOLOGY:  OTHER: 	      PREVIOUS DIAGNOSTIC CARDIOVASCULAR TESTING:      [ ]  Echocardiogram:  [ ]  Catheterization:  [ ]  Stress test:    [ ]  Vascular studies: Cardiology/Vascular Medicine Inpatient Consultation Note    Asked by Dr. Hamm to evaluate patient with acute DVT.      HISTORY OF PRESENT ILLNESS:    Patient is a 68 yo morbidly obese man with history of CVA causing left sided paralysis over 10 years ago, DVT's in the past with PE's, recently stopped warfarin as recommended by his PCP which he was on for years coming in with worsening LLE pain where he has had DVT's in the past.  No chest pain, + SOB and palp.  No cough no fevers no chills.  Pt lives at home where he has 24 hour nursing care.      Of note, patient states that he is relatively immobile and spends much of his time in bed or wheelchair. He has had swelling and venous stasis skin changes and ulcers for some time.      EXAM:  CT ANGIO CHEST (W)AW IC                            PROCEDURE DATE:  06/07/2017        INTERPRETATION:  CLINICAL INFORMATION: Palpitations and leg pain. History   of PE recently taken off of anticoagulation.    COMPARISON: CT chest February 5, 2010     PROCEDURE:   CTA of the Chest was performed withintravenous contrast.  90 ml of Omnipaque 350 was injected intravenously. 10 ml were discarded.  Sagittal and coronal reformats were performed as well as 3D   Reconstructions.    FINDINGS:    CHEST:     LUNGS AND LARGE AIRWAYS: Patent central airways. Right upper lobe nodule   (8:58) measures 7 mm, unchanged from the prior study.  PLEURA: No pleural effusion.  VESSELS: There is no central, lobar, or segmental pulmonary embolus.   Evaluation of the subsegmental branches is limited secondary to body   habitus and motion artifact. Atherosclerotic calcifications of the aorta.  HEART: Heart size is normal.No pericardial effusion.  MEDIASTINUM AND WALTER: No lymphadenopathy.  CHEST WALL AND LOWER NECK: Within normal limits.  VISUALIZED UPPER ABDOMEN:Fatty atrophy of the pancreas. Indeterminant an   ill-defined soft tissue density pancreatic body lesion measuring 2.5 x   2.9 x 3.3 cm with interval increase in size since February 5, 2010   measured about 2 x 1.5 cm.  BONES: Within normal limits.    IMPRESSION:   1.  No central, lobar, or segmental pulmonary embolus. Evaluation of the   subsegmental branches is limited secondary to body habitus and motion   artifact.  2.  Soft tissue density pancreatic body lesion measuring 2.5 x 2.9 x 3.3   cmwith interval increase in size since February 5, 2010 worrisome for   neoplastic etiology. GI consultation is recommended.      ***Please see the addendum at the top of this report. It may contain   additional important information or changes.****      LINDA MUNOZ M.D., RADIOLOGY RESIDENT  This document has been electronically signed.  ANUSHKA WINTERS M.D. ATTENDING RADIOLOGIST  This document has been electronically signed. Jun 7 2017  6:58PM  Addend:  ANUSHKA WINTERS M.D. ATTENDING RADIOLOGIST  This addendum was electronically signed on: Jun 7 2017  8:14PM.            EXAM:  DUPLEX SCAN EXT VEINS LOWER BI                            PROCEDURE DATE:  06/07/2017        INTERPRETATION:  History:On a prior examination dated 3/2/2011, a short   segment of nonocclusive thrombus was identified in the right common   femoral vein, IVC filter placed, recently anticoagulation medicines had   been halted, left lower extremity swelling, rule out DVT.    There is edema within the superficial soft tissues of the left lower   extremity.    On the right, there remains intraluminal filling defects similar in   appearance to those 6 years earlier, representing the residue of prior   DVT.    There is acute DVT, almost occlusive to flow, affecting the left femoral   and common femoral veins.    The left greater saphenous vein, a superficial vein, is also thrombosed.    The calf veins were not diagnostically imaged.    IMPRESSION: There is acute almost occlusive DVT affecting the left   femoral and common femoral veins.  Superficial thrombophlebitis affects the left greater saphenous vein.    Allergies    No Known Allergies      MEDICATIONS:  heparin  Infusion. Unit(s)/Hr IV Continuous <Continuous>  heparin  Injectable 77699Fhcm(s) IV Push every 6 hours PRN  heparin  Injectable 5000Unit(s) IV Push every 6 hours PRN  lisinopril 10milliGRAM(s) Oral daily  furosemide    Tablet 40milliGRAM(s) Oral daily        carBAMazepine XR Tablet 400milliGRAM(s) Oral two times a day  levETIRAcetam 500milliGRAM(s) Oral two times a day      atorvastatin 80milliGRAM(s) Oral at bedtime        PAST MEDICAL & SURGICAL HISTORY:  Pulmonary embolism  Obesity  Seizure disorder  Hemiplegia Affecting Dominant Side  Aphasia: Expressive  Hypercholesteremia  CVA (Cerebral Vascular Accident)  Pulmonary embolism: wyatt filter  S/P cataract extraction and insertion of intraocular lens, right: 9/16/2010  No Past Surgical History      REVIEW OF SYSTEMS:  CONSTITUTIONAL: +weight gain  EYES: No eye pain, visual disturbances, or discharge  ENT:  No difficulty hearing, tinnitus, vertigo; No sinus or throat pain  NECK: No pain or stiffness  RESPIRATORY: Mild SOB  CARDIOVASCULAR: No chest pain, palpitations, passing out, dizziness, or leg swelling  GASTROINTESTINAL: No abdominal or epigastric pain. No nausea, vomiting, or hematemesis; No diarrhea or constipation. No melena or hematochezia.  GENITOURINARY: No dysuria, frequency, hematuria, or incontinence  NEUROLOGICAL: +b/l LE weakness, LE edema  SKIN: +chronic venous stasis changes   ENDOCRINE: No heat or cold intolerance; No hair loss  MUSCULOSKELETAL: +b/l LE weakness  PSYCHIATRIC: No depression, anxiety, mood swings, or difficulty sleeping      PHYSICAL EXAM:  T(C): 36.6, Max: 37.3 (06-07 @ 17:44)  HR: 60 (58 - 71)  BP: 114/72 (114/72 - 159/66)  RR: 17 (17 - 20)  SpO2: 96% (96% - 100%)  Wt(kg): --  I&O's Summary    I & Os for current day (as of 08 Jun 2017 06:13)  =============================================  IN: 0 ml / OUT: 500 ml / NET: -500 ml      Appearance: with morbid obesity, appears chronically ill  HEENT:   Normal oral mucosa, PERRL, EOMI	  Lymphatic: No obvious lymphadenopathy  Cardiovascular: Normal S1 S2, No JVD, No murmurs,  Respiratory: Decreased BS anteriorly, poor inspiratory effort	  Psychiatry: A & O x 3, Mood & affect appropriate  Gastrointestinal:  Obese, otherwise soft, Non-tender, + BS	  Skin: +chronic venous stasis changes LLE>RLE, skin discoloration	  Neurologic: +B/L weakness  Extremities: Edema as noted above.      LABS:	 	    CBC Full  -  ( 08 Jun 2017 00:41 )  WBC Count : 8.9 K/uL  Hemoglobin : 11.8 g/dL  Hematocrit : 34.1 %  Platelet Count - Automated : 207 K/uL  Mean Cell Volume : 99.8 fl  Mean Cell Hemoglobin : 34.5 pg  Mean Cell Hemoglobin Concentration : 34.6 gm/dL  Auto Neutrophil # : x  Auto Lymphocyte # : x  Auto Monocyte # : x  Auto Eosinophil # : x  Auto Basophil # : x  Auto Neutrophil % : x  Auto Lymphocyte % : x  Auto Monocyte % : x  Auto Eosinophil % : x  Auto Basophil % : x    06-07    141  |  103  |  23  ----------------------------<  129<H>  4.8   |  22  |  0.97    Ca    8.7      07 Jun 2017 12:32    TPro  8.2  /  Alb  3.8  /  TBili  0.2  /  DBili  x   /  AST  21  /  ALT  22  /  AlkPhos  102  06-07      proBNP: Serum Pro-Brain Natriuretic Peptide: 51 pg/mL (06-07 @ 12:32)      TELEMETRY: SR	    ECG:  SR 	  RADIOLOGY: studies as noted above      A/P:  Probably provoked VTE.  +Acute LE DVT in the setting of a) possible pancreatic mass highly suspicious for cancer, b) immobility, c) prior history of VTE.    Hemodynamically stable, no suggestion of respiratory distress.    --Agree with heparin gtt at this time.  Morbid obesity precludes use of LMWH such as enoxaparin.  --GI evaluation, MRI/MRCP as probable next imaging modality to characterize pancreatic mass.  --TTE pending.  --Wound care evaluation.  --Will follow with you--thank you. Cardiology/Vascular Medicine Inpatient Consultation Note    Asked by Dr. Hamm to evaluate patient with acute DVT.      HISTORY OF PRESENT ILLNESS:    Patient is a 66 yo morbidly obese man with history of CVA causing left sided paralysis over 10 years ago, DVT's in the past with PE's, recently stopped warfarin as recommended by his PCP which he was on for years coming in with worsening LLE pain where he has had DVT's in the past.  No chest pain, + SOB and palp.  No cough no fevers no chills.  Pt lives at home where he has 24 hour nursing care.      Of note, patient states that he is relatively immobile and spends much of his time in bed or wheelchair. He has had swelling and venous stasis skin changes and ulcers for some time.      EXAM:  CT ANGIO CHEST (W)AW IC                            PROCEDURE DATE:  06/07/2017        INTERPRETATION:  CLINICAL INFORMATION: Palpitations and leg pain. History   of PE recently taken off of anticoagulation.    COMPARISON: CT chest February 5, 2010     PROCEDURE:   CTA of the Chest was performed withintravenous contrast.  90 ml of Omnipaque 350 was injected intravenously. 10 ml were discarded.  Sagittal and coronal reformats were performed as well as 3D   Reconstructions.    FINDINGS:    CHEST:     LUNGS AND LARGE AIRWAYS: Patent central airways. Right upper lobe nodule   (8:58) measures 7 mm, unchanged from the prior study.  PLEURA: No pleural effusion.  VESSELS: There is no central, lobar, or segmental pulmonary embolus.   Evaluation of the subsegmental branches is limited secondary to body   habitus and motion artifact. Atherosclerotic calcifications of the aorta.  HEART: Heart size is normal.No pericardial effusion.  MEDIASTINUM AND WALTER: No lymphadenopathy.  CHEST WALL AND LOWER NECK: Within normal limits.  VISUALIZED UPPER ABDOMEN:Fatty atrophy of the pancreas. Indeterminant an   ill-defined soft tissue density pancreatic body lesion measuring 2.5 x   2.9 x 3.3 cm with interval increase in size since February 5, 2010   measured about 2 x 1.5 cm.  BONES: Within normal limits.    IMPRESSION:   1.  No central, lobar, or segmental pulmonary embolus. Evaluation of the   subsegmental branches is limited secondary to body habitus and motion   artifact.  2.  Soft tissue density pancreatic body lesion measuring 2.5 x 2.9 x 3.3   cmwith interval increase in size since February 5, 2010 worrisome for   neoplastic etiology. GI consultation is recommended.      ***Please see the addendum at the top of this report. It may contain   additional important information or changes.****      LINDA MUNOZ M.D., RADIOLOGY RESIDENT  This document has been electronically signed.  ANUSHKA WINTERS M.D. ATTENDING RADIOLOGIST  This document has been electronically signed. Jun 7 2017  6:58PM  Addend:  ANUSHKA WINTERS M.D. ATTENDING RADIOLOGIST  This addendum was electronically signed on: Jun 7 2017  8:14PM.            EXAM:  DUPLEX SCAN EXT VEINS LOWER BI                            PROCEDURE DATE:  06/07/2017        INTERPRETATION:  History:On a prior examination dated 3/2/2011, a short   segment of nonocclusive thrombus was identified in the right common   femoral vein, IVC filter placed, recently anticoagulation medicines had   been halted, left lower extremity swelling, rule out DVT.    There is edema within the superficial soft tissues of the left lower   extremity.    On the right, there remains intraluminal filling defects similar in   appearance to those 6 years earlier, representing the residue of prior   DVT.    There is acute DVT, almost occlusive to flow, affecting the left femoral   and common femoral veins.    The left greater saphenous vein, a superficial vein, is also thrombosed.    The calf veins were not diagnostically imaged.    IMPRESSION: There is acute almost occlusive DVT affecting the left   femoral and common femoral veins.  Superficial thrombophlebitis affects the left greater saphenous vein.    Allergies    No Known Allergies      MEDICATIONS:  heparin  Infusion. Unit(s)/Hr IV Continuous <Continuous>  heparin  Injectable 22459Itor(s) IV Push every 6 hours PRN  heparin  Injectable 5000Unit(s) IV Push every 6 hours PRN  lisinopril 10milliGRAM(s) Oral daily  furosemide    Tablet 40milliGRAM(s) Oral daily        carBAMazepine XR Tablet 400milliGRAM(s) Oral two times a day  levETIRAcetam 500milliGRAM(s) Oral two times a day      atorvastatin 80milliGRAM(s) Oral at bedtime        PAST MEDICAL & SURGICAL HISTORY:  Pulmonary embolism  Obesity  Seizure disorder  Hemiplegia Affecting Dominant Side  Aphasia: Expressive  Hypercholesteremia  CVA (Cerebral Vascular Accident)  Pulmonary embolism: wyatt filter  S/P cataract extraction and insertion of intraocular lens, right: 9/16/2010  No Past Surgical History      REVIEW OF SYSTEMS:  CONSTITUTIONAL: +weight gain  EYES: No eye pain, visual disturbances, or discharge  ENT:  No difficulty hearing, tinnitus, vertigo; No sinus or throat pain  NECK: No pain or stiffness  RESPIRATORY: Mild SOB  CARDIOVASCULAR: No chest pain, palpitations, passing out, dizziness, or leg swelling  GASTROINTESTINAL: No abdominal or epigastric pain. No nausea, vomiting, or hematemesis; No diarrhea or constipation. No melena or hematochezia.  GENITOURINARY: No dysuria, frequency, hematuria, or incontinence  NEUROLOGICAL: +b/l LE weakness, LE edema  SKIN: +chronic venous stasis changes   ENDOCRINE: No heat or cold intolerance; No hair loss  MUSCULOSKELETAL: +b/l LE weakness  PSYCHIATRIC: No depression, anxiety, mood swings, or difficulty sleeping      PHYSICAL EXAM:  T(C): 36.6, Max: 37.3 (06-07 @ 17:44)  HR: 60 (58 - 71)  BP: 114/72 (114/72 - 159/66)  RR: 17 (17 - 20)  SpO2: 96% (96% - 100%)  Wt(kg): --  I&O's Summary    I & Os for current day (as of 08 Jun 2017 06:13)  =============================================  IN: 0 ml / OUT: 500 ml / NET: -500 ml      Appearance: with morbid obesity, appears chronically ill  HEENT:   Normal oral mucosa, PERRL, EOMI	  Lymphatic: No obvious lymphadenopathy  Cardiovascular: Normal S1 S2, No JVD, No murmurs,  Respiratory: Decreased BS anteriorly, poor inspiratory effort	  Psychiatry: A & O x 3, Mood & affect appropriate  Gastrointestinal:  Obese, otherwise soft, Non-tender, + BS	  Skin: +chronic venous stasis changes LLE>RLE, skin discoloration	  Neurologic: +B/L weakness  Extremities: Edema as noted above.      LABS:	 	    CBC Full  -  ( 08 Jun 2017 00:41 )  WBC Count : 8.9 K/uL  Hemoglobin : 11.8 g/dL  Hematocrit : 34.1 %  Platelet Count - Automated : 207 K/uL  Mean Cell Volume : 99.8 fl  Mean Cell Hemoglobin : 34.5 pg  Mean Cell Hemoglobin Concentration : 34.6 gm/dL  Auto Neutrophil # : x  Auto Lymphocyte # : x  Auto Monocyte # : x  Auto Eosinophil # : x  Auto Basophil # : x  Auto Neutrophil % : x  Auto Lymphocyte % : x  Auto Monocyte % : x  Auto Eosinophil % : x  Auto Basophil % : x    06-07    141  |  103  |  23  ----------------------------<  129<H>  4.8   |  22  |  0.97    Ca    8.7      07 Jun 2017 12:32    TPro  8.2  /  Alb  3.8  /  TBili  0.2  /  DBili  x   /  AST  21  /  ALT  22  /  AlkPhos  102  06-07      proBNP: Serum Pro-Brain Natriuretic Peptide: 51 pg/mL (06-07 @ 12:32)      TELEMETRY: SR	    ECG:  SR 	  RADIOLOGY: studies as noted above      A/P:  Probably provoked VTE.  +Acute LE DVT in the setting of a) possible pancreatic mass highly suspicious for cancer, b) immobility, c) prior history of VTE.    Hemodynamically stable, no suggestion of respiratory distress.    --Agree with heparin gtt at this time.  Morbid obesity precludes use of LMWH such as enoxaparin.  --Do not recommend IVC filter as patient is likely hypercoagulable with underlying neoplasm.  --GI evaluation, MRI/MRCP if he can fit, but otherwise GI likely going to recommend EUS as probable next step in evaluation.  --TTE pending as initial cardiovascular evaluation in advance of probable pre-procedural cardiovascular evaluation.  --Wound care evaluation.  --Will follow with you--thank you.

## 2017-06-08 NOTE — CONSULT NOTE ADULT - ATTENDING COMMENTS
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  EUS/FNA as per GI
Agree with above.  Pt at mild to moderate risk of pulmonary complications from proposed procedure  May hold heparin especially if pt has Simone filter

## 2017-06-09 ENCOUNTER — RESULT REVIEW (OUTPATIENT)
Age: 67
End: 2017-06-09

## 2017-06-09 LAB
BASOPHILS # BLD AUTO: 0 K/UL — SIGNIFICANT CHANGE UP (ref 0–0.2)
BASOPHILS NFR BLD AUTO: 0.8 % — SIGNIFICANT CHANGE UP (ref 0–2)
CANCER AG19-9 SERPL-ACNC: 42.5 U/ML — HIGH
EOSINOPHIL # BLD AUTO: 0.1 K/UL — SIGNIFICANT CHANGE UP (ref 0–0.5)
EOSINOPHIL NFR BLD AUTO: 1.9 % — SIGNIFICANT CHANGE UP (ref 0–6)
HCT VFR BLD CALC: 34.7 % — LOW (ref 39–50)
HGB BLD-MCNC: 11.5 G/DL — LOW (ref 13–17)
LYMPHOCYTES # BLD AUTO: 1.4 K/UL — SIGNIFICANT CHANGE UP (ref 1–3.3)
LYMPHOCYTES # BLD AUTO: 25.5 % — SIGNIFICANT CHANGE UP (ref 13–44)
MCHC RBC-ENTMCNC: 33.1 PG — SIGNIFICANT CHANGE UP (ref 27–34)
MCHC RBC-ENTMCNC: 33.3 GM/DL — SIGNIFICANT CHANGE UP (ref 32–36)
MCV RBC AUTO: 99.7 FL — SIGNIFICANT CHANGE UP (ref 80–100)
MONOCYTES # BLD AUTO: 0.5 K/UL — SIGNIFICANT CHANGE UP (ref 0–0.9)
MONOCYTES NFR BLD AUTO: 8.8 % — SIGNIFICANT CHANGE UP (ref 2–14)
NEUTROPHILS # BLD AUTO: 3.6 K/UL — SIGNIFICANT CHANGE UP (ref 1.8–7.4)
NEUTROPHILS NFR BLD AUTO: 63 % — SIGNIFICANT CHANGE UP (ref 43–77)
PLATELET # BLD AUTO: 208 K/UL — SIGNIFICANT CHANGE UP (ref 150–400)
RBC # BLD: 3.48 M/UL — LOW (ref 4.2–5.8)
RBC # FLD: 11.8 % — SIGNIFICANT CHANGE UP (ref 10.3–14.5)
WBC # BLD: 5.7 K/UL — SIGNIFICANT CHANGE UP (ref 3.8–10.5)
WBC # FLD AUTO: 5.7 K/UL — SIGNIFICANT CHANGE UP (ref 3.8–10.5)

## 2017-06-09 PROCEDURE — 99222 1ST HOSP IP/OBS MODERATE 55: CPT

## 2017-06-09 PROCEDURE — 88173 CYTOPATH EVAL FNA REPORT: CPT | Mod: 26

## 2017-06-09 PROCEDURE — 99233 SBSQ HOSP IP/OBS HIGH 50: CPT

## 2017-06-09 PROCEDURE — 88305 TISSUE EXAM BY PATHOLOGIST: CPT | Mod: 26

## 2017-06-09 PROCEDURE — 88312 SPECIAL STAINS GROUP 1: CPT | Mod: 26

## 2017-06-09 RX ORDER — CIPROFLOXACIN LACTATE 400MG/40ML
400 VIAL (ML) INTRAVENOUS EVERY 12 HOURS
Qty: 0 | Refills: 0 | Status: COMPLETED | OUTPATIENT
Start: 2017-06-09 | End: 2017-06-14

## 2017-06-09 RX ADMIN — LISINOPRIL 10 MILLIGRAM(S): 2.5 TABLET ORAL at 05:20

## 2017-06-09 RX ADMIN — Medication 400 MILLIGRAM(S): at 05:20

## 2017-06-09 RX ADMIN — Medication 40 MILLIGRAM(S): at 05:21

## 2017-06-09 RX ADMIN — ATORVASTATIN CALCIUM 80 MILLIGRAM(S): 80 TABLET, FILM COATED ORAL at 21:44

## 2017-06-09 RX ADMIN — LEVETIRACETAM 500 MILLIGRAM(S): 250 TABLET, FILM COATED ORAL at 05:21

## 2017-06-09 RX ADMIN — Medication 200 MILLIGRAM(S): at 21:44

## 2017-06-09 RX ADMIN — Medication 400 MILLIGRAM(S): at 21:44

## 2017-06-09 RX ADMIN — LEVETIRACETAM 500 MILLIGRAM(S): 250 TABLET, FILM COATED ORAL at 21:44

## 2017-06-09 NOTE — PROGRESS NOTE ADULT - SUBJECTIVE AND OBJECTIVE BOX
cc : worsening lower ext pain     SUBJECTIVE / OVERNIGHT EVENTS: denies chest pain, shortness of breath, nausea,v , weakness in lower ext        MEDICATIONS  (STANDING):  heparin  Infusion. Unit(s)/Hr IV Continuous <Continuous>  lisinopril 10milliGRAM(s) Oral daily  carBAMazepine XR Tablet 400milliGRAM(s) Oral two times a day  levETIRAcetam 500milliGRAM(s) Oral two times a day  atorvastatin 80milliGRAM(s) Oral at bedtime  furosemide    Tablet 40milliGRAM(s) Oral daily    MEDICATIONS  (PRN):  heparin  Injectable 67140Civy(s) IV Push every 6 hours PRN For aPTT less than 40  heparin  Injectable 5000Unit(s) IV Push every 6 hours PRN For aPTT between 40 - 57      Vital Signs Last 24 Hrs  T(C): 36.6, Max: 36.9 ( @ 20:13)  T(F): 97.8, Max: 98.4 ( @ 20:13)  HR: 55 (55 - 76)  BP: 111/68 (111/68 - 137/80)  BP(mean): --  RR: 18 (18 - 18)  SpO2: 98% (96% - 98%)  Constitutional: No fever, fatigue or weight loss.  Skin: No rash.  Eyes: No recent vision problems or eye pain.  ENT: No congestion, ear pain, or sore throat.  Endocrine: No thyroid problems.  Cardiovascular: No chest pain or palpation.  Respiratory: No cough, shortness of breath, congestion, or wheezing.  Gastrointestinal: No abdominal pain, nausea, vomiting, or diarrhea.  Genitourinary: No dysuria.  Musculoskeletal: No joint swelling.  Neurologic: No headache.    PHYSICAL EXAM:  GENERAL: morbidly obese    EYES: EOMI, PERRLA, conjunctiva and sclera clear  NECK: Supple, No JVD  CHEST/LUNG: dec breath sounds at bases  HEART: Regular rate and rhythm;   ABDOMEN: Soft, Nontender, mild distenson+; Bowel sounds present  EXTREMITIES:  2+ Peripheral Pulses, No clubbing, cyanosis, or edema  Neuro : alert, awake calm , coopertive  SKIN: erythematous  rodolfo lower ext , chronic venous stasis changes +, fungal toe nails, dry scaly skin over rt foot          LABS:      137  |  100  |  17  ----------------------------<  119<H>  4.5   |  23  |  0.81    Ca    8.7      2017 09:41    TPro  7.9  /  Alb  3.6  /  TBili  0.3  /  DBili      /  AST  18  /  ALT  17  /  AlkPhos  100  06-08    Creatinine Trend: 0.81 <--, 0.97 <--                        11.5   5.7   )-----------( 208      ( 2017 09:25 )             34.7     Urine Studies:  Urinalysis Basic - ( 2017 14:02 )    Color: Yellow / Appearance: Clear / S.030 / pH:   Gluc:  / Ketone: Trace  / Bili: Negative / Urobili: 1   Blood:  / Protein: 100 mg/dL / Nitrite: Negative   Leuk Esterase: Trace / RBC: 3-5 /HPF / WBC 6-10 /HPF   Sq Epi:  / Non Sq Epi: OCC /HPF / Bacteria:               LIVER FUNCTIONS - ( 2017 09:41 )  Alb: 3.6 g/dL / Pro: 7.9 g/dL / ALK PHOS: 100 U/L / ALT: 17 U/L / AST: 18 U/L / GGT: x           PTT - ( 2017 23:09 )  PTT:67.5 sec                                                                       ext , chronic venous changes +  LABS:                        11.8   8.9   )-----------( 207      ( 2017 00:41 )             34.1     06-08    137  |  100  |  17  ----------------------------<  119<H>  4.5   |  23  |  0.81    Ca    8.7      2017 09:41    TPro  7.9  /  Alb  3.6  /  TBili  0.3  /  DBili  x   /  AST  18  /  ALT  17  /  AlkPhos  100  06-08    PT/INR - ( 2017 12:32 )   PT: 12.6 sec;   INR: 1.16 ratio         PTT - ( 2017 08:33 )  PTT:116.8 sec  CARDIAC MARKERS ( 2017 12:32 )  x     / <0.01 ng/mL / x     / x     / x          Urinalysis Basic - ( 2017 14:02 )    Color: Yellow / Appearance: Clear / S.030 / pH: x  Gluc: x / Ketone: Trace  / Bili: Negative / Urobili: 1   Blood: x / Protein: 100 mg/dL / Nitrite: Negative   Leuk Esterase: Trace / RBC: 3-5 /HPF / WBC 6-10 /HPF   Sq Epi: x / Non Sq Epi: OCC /HPF / Bacteria: x        RADIOLOGY & ADDITIONAL TESTS:    Imaging Personally Reviewed:    Consultant(s) Notes Reviewed:      Care Discussed with Consultants/Other Providers:

## 2017-06-09 NOTE — PROGRESS NOTE ADULT - ASSESSMENT
66 y/o M with history of provoked PE-DVT 2nd tobacco use and immobility s/p [place] filter (90s), CVA with residual R hemiplegia (2001), seizure history, primarily bedbound now. Admitted with increased LE pain after recently stopping coumadin. Found to have acute on chronic DVT. No evidence of PE. Incidentally noted pancreatic lesion being recommended for EUS. Called for pre-procedural assessment.

## 2017-06-09 NOTE — PROGRESS NOTE ADULT - SUBJECTIVE AND OBJECTIVE BOX
Follow-up Pulm Progress Note    No new respiratory events overnight.  Denies SOB/CP.     Medications:  MEDICATIONS  (STANDING):  lisinopril 10milliGRAM(s) Oral daily  carBAMazepine XR Tablet 400milliGRAM(s) Oral two times a day  levETIRAcetam 500milliGRAM(s) Oral two times a day  atorvastatin 80milliGRAM(s) Oral at bedtime  furosemide    Tablet 40milliGRAM(s) Oral daily    MEDICATIONS  (PRN):  heparin  Injectable 24653Gikm(s) IV Push every 6 hours PRN For aPTT less than 40  heparin  Injectable 5000Unit(s) IV Push every 6 hours PRN For aPTT between 40 - 57        Vital Signs Last 24 Hrs  T(C): 36.6, Max: 36.9 ( @ 20:13)  T(F): 97.8, Max: 98.4 ( @ 20:13)  HR: 55 (55 - 76)  BP: 111/68 (111/68 - 137/80)  BP(mean): --  RR: 18 (18 - 18)  SpO2: 98% (96% - 98%) on RA        I & Os for 24h ending  @ 07:00  =============================================  IN: 1154 ml / OUT: 2725 ml / NET: -1571 ml        LABS:                        11.5   5.7   )-----------( 208      ( 2017 09:25 )             34.7         137  |  100  |  17  ----------------------------<  119<H>  4.5   |  23  |  0.81    Ca    8.7      2017 09:41    TPro  7.9  /  Alb  3.6  /  TBili  0.3  /  DBili  x   /  AST  18  /  ALT  17  /  AlkPhos  100            CAPILLARY BLOOD GLUCOSE    PTT - ( 2017 23:09 )  PTT:67.5 sec  Urinalysis Basic - ( 2017 14:02 )    Color: Yellow / Appearance: Clear / S.030 / pH: x  Gluc: x / Ketone: Trace  / Bili: Negative / Urobili: 1   Blood: x / Protein: 100 mg/dL / Nitrite: Negative   Leuk Esterase: Trace / RBC: 3-5 /HPF / WBC 6-10 /HPF   Sq Epi: x / Non Sq Epi: OCC /HPF / Bacteria: x        Serum Pro-Brain Natriuretic Peptide: 51 pg/mL ( @ 12:32)        Physical Examination:  PULM: Decreased BS throughout  CVS: Regular rate and rhythm, no murmurs, rubs, or gallops    RADIOLOGY REVIEWED    CTA chest: no main, lobar, segemental PE. Unable to evaluate for subsegmental PE.     TTE: Conclusions:  1. Normal left ventricular internal dimensions and wall  thicknesses.  2. Endocardial visualization enhanced with intravenous  injection of echo contrast (Definity). Endocardium not well  visualized despite the use of echo contrast. Grossly normal  LV function. Suboptimal images precludes accurage  evaluation for regional wall motion abnormalities.  3. The right ventricle is notwell visualized; grossly  normal right ventricular systolic function.  *** No previous Echo exam.    AXR: IVC filter at the level of L3.

## 2017-06-09 NOTE — PROGRESS NOTE ADULT - SUBJECTIVE AND OBJECTIVE BOX
[facility] - Yan Loaiza, Carly, Jen, Tomy Azevedo    Patient resting comfortably in bed in NAD.  Laying flat with no respiratory distress.  No complaints of chest pain, increased dyspnea, palpitations, PND, or orthopnea.  Off heparin gtt for EUS today.    Telemetry:  Sinus rhythm and sinus griffin    MEDICATIONS  (STANDING):  lisinopril 10milliGRAM(s) Oral daily  carBAMazepine XR Tablet 400milliGRAM(s) Oral two times a day  levETIRAcetam 500milliGRAM(s) Oral two times a day  atorvastatin 80milliGRAM(s) Oral at bedtime  furosemide    Tablet 40milliGRAM(s) Oral daily    MEDICATIONS  (PRN):  heparin  Injectable 80660Gjha(s) IV Push every 6 hours PRN For aPTT less than 40  heparin  Injectable 5000Unit(s) IV Push every 6 hours PRN For aPTT between 40 - 57      Allergies    No Known Allergies        Vital Signs Last 24 Hrs  T(C): 36.7, Max: 36.9 (06-08 @ 20:13)  T(F): 98, Max: 98.4 (06-08 @ 20:13)  HR: 76 (66 - 76)  BP: 137/80 (120/64 - 137/80)  BP(mean): --  RR: 18 (18 - 18)  SpO2: 96% (96% - 96%)    I&O's Summary    I & Os for current day (as of 09 Jun 2017 07:46)  =============================================  IN: 1154 ml / OUT: 2725 ml / NET: -1571 ml      ON EXAM:    General: NAD, awake and alert, oriented x 3  HEENT: Mucous membranes are moist, anicteric  Lungs: Non-labored, breath sounds are clear bilaterally, No wheezing, rales or rhonchi.  Decreased breath sounds bilaterally  Cardiovascular: Regular, S1 and S2, no murmurs, rubs, or gallops. Distant heart sounds  Gastrointestinal: Bowel Sounds present, soft, nontender.   Obese  Lymph: 2+ peripheral edema. No lymphadenopathy.  Skin: b/l LE venous stasis changes  Psych:  Mood & affect appropriate    LABS: All Labs Reviewed:                        11.9   7.76  )-----------( 235      ( 08 Jun 2017 09:40 )             36.5                         11.8   8.9   )-----------( 207      ( 08 Jun 2017 00:41 )             34.1                         13.3   9.8   )-----------( 216      ( 07 Jun 2017 12:32 )             38.6     08 Jun 2017 09:41    137    |  100    |  17     ----------------------------<  119    4.5     |  23     |  0.81   07 Jun 2017 12:32    141    |  103    |  23     ----------------------------<  129    4.8     |  22     |  0.97     Ca    8.7        08 Jun 2017 09:41  Ca    8.7        07 Jun 2017 12:32    TPro  7.9    /  Alb  3.6    /  TBili  0.3    /  DBili  x      /  AST  18     /  ALT  17     /  AlkPhos  100    08 Jun 2017 09:41  TPro  8.2    /  Alb  3.8    /  TBili  0.2    /  DBili  x      /  AST  21     /  ALT  22     /  AlkPhos  102    07 Jun 2017 12:32    PT/INR - ( 07 Jun 2017 12:32 )   PT: 12.6 sec;   INR: 1.16 ratio         PTT - ( 08 Jun 2017 23:09 )  PTT:67.5 sec  CARDIAC MARKERS ( 07 Jun 2017 12:32 )  x     / <0.01 ng/mL / x     / x     / x          Blood Culture:   06-07 @ 12:32  Pro Bnp 51        Assessment/Plan: 67 y.o. male history of a CVA with left hemiparesis, DVT's in the past with PE's, morbid obesity, sz d/o, expressive aphasia who presents with acute DVT having had warfarin dc 2w ago, and with pancreatic mass susp for neoplasm:    -no acute ischemia  -no signif arrhythmias noted, cont tele monitoring  -edema with venous stasis, can cont po lasix  -for eus today to work up pancreatic mass.  optimized for the procedure from a cardiac point of view with no evidence of active cardiac conditions.  patient is at high risk given his comorbidities  -heparin gtt on hold for procedure.  resume post.  eventual resumption of coumadin pending his clinical course  -echo unrevealing  -will follow

## 2017-06-09 NOTE — CONSULT NOTE ADULT - ASSESSMENT
BLE PVD and Lymphedema w/ Chronic LLE DVT    Leg elevation  Consider COmpression when permitted as (+)acute DVT  Moisturize   Consider DPM  COn't offloading/ pericare/ NUTRITION  Care as per medicine will follow w/ you  Thank you for this consult

## 2017-06-09 NOTE — CONSULT NOTE ADULT - SUBJECTIVE AND OBJECTIVE BOX
Wound SURGERY CONSULT NOTE    HPI:  66 yo morbidly obese male w/history of a CVA causing left sided paralysis over 10 years ago, DVT's in the past with PE's, recently stopped coumadin as recommended by his PCP which he was on for years. Pt comes into ER with worsening LLE pain where he has had DVT's in the past.  No chest pain, + SoB and palp.  No cough no fevers no chills. No d/c no odor.  Pt lives at home where he has 24 hour nursing care.  Any movement of the leg makes pain worse, nothing makes it better. pt denies recent weight loss, loss of appetite pts Pt has compression wraps.  doesn;t wear them.  Daughter notes chronic red color w/ scale of RLE and legs are always swollen.      PAST MEDICAL & SURGICAL HISTORY:  Pulmonary embolism  Obesity  Seizure disorder  Hypercholesteremia  CVA (Cerebral Vascular Accident) w/ Hemiplegia Affecting Dominant Side & Aphasia: Expressive  Pulmonary embolism: s/p [place] filter  S/P cataract extraction and insertion of intraocular lens, right: 9/16/2010      REVIEW OF SYSTEMS    Skin/Musculoskeletal: see HPI  All other systems negative      MEDICATIONS  (STANDING):  lisinopril 10milliGRAM(s) Oral daily  carBAMazepine XR Tablet 400milliGRAM(s) Oral two times a day  levETIRAcetam 500milliGRAM(s) Oral two times a day  atorvastatin 80milliGRAM(s) Oral at bedtime  furosemide    Tablet 40milliGRAM(s) Oral daily  ciprofloxacin   IVPB 400milliGRAM(s) IV Intermittent every 12 hours    MEDICATIONS  (PRN):  heparin  Injectable 85713Lwwy(s) IV Push every 6 hours PRN For aPTT less than 40  heparin  Injectable 5000Unit(s) IV Push every 6 hours PRN For aPTT between 40 - 57      No Known Allergies    SOCIAL HISTORY:  has 24hr HHA                       Denies smoking, ETOH, drugs    FAMILY HISTORY:  No pertinent family history in first degree relatives      Vital Signs Last 24 Hrs  T(C): 36.6, Max: 36.9 (06-08 @ 20:13)  T(F): 97.8, Max: 98.4 (06-08 @ 20:13)  HR: 55 (55 - 76)  BP: 111/68 (111/68 - 137/80)  BP(mean): --  RR: 18 (18 - 18)  SpO2: 98% (96% - 98%)    PHYSICAL EXAM:    Constitutional: NAD A&Ox3 MO  (+)bariatric low air loss bed    Respiratory: decreased BS    Cardiovascular:RRR    Gastrointestinal: soft NT/ND (+)BS    Extremities/Vascula/Musculoskeletal: passivre ROM, BLE lymphedema, (+)hemosiderin staining   No palp DP/PT, BLE equally warm, no cellulitis, no drainage, nontender, no open wounds noted  Rt foot w/ nonblanchable erythema w/ scales   BLE w/ fungal toe nails    Neurological: weakened strength R>L side. sensation grossly intact, slurring of speech w/ word finding difficulty    Skin: moist w/ good turgor, except as noted above    LABS:                        11.5   5.7   )-----------( 208      ( 09 Jun 2017 09:25 )             34.7     06-08    137  |  100  |  17  ----------------------------<  119<H>  4.5   |  23  |  0.81    Ca    8.7      08 Jun 2017 09:41    TPro  7.9  /  Alb  3.6  /  TBili  0.3  /  DBili  x   /  AST  18  /  ALT  17  /  AlkPhos  100  06-08    PTT - ( 08 Jun 2017 23:09 )  PTT:67.5 sec      RADIOLOGY & ADDITIONAL STUDIES:    EXAM:  DUPLEX SCAN EXT VEINS LOWER BI                        PROCEDURE DATE:  06/07/2017    IMPRESSION: There is acute almost occlusive DVT affecting the left   femoral and common femoral veins.  Superficial thrombophlebitis affects the left greater saphenous vein.

## 2017-06-09 NOTE — PROGRESS NOTE ADULT - SUBJECTIVE AND OBJECTIVE BOX
[facility] - Yan Loaiza, Carly, Jen, Tomy Azevedo    Patient resting comfortably in bed in NAD.  Laying flat with no respiratory distress.  No complaints of chest pain, increased dyspnea, palpitations, PND, or orthopnea.  Off heparin gtt for EUS today.    Telemetry:  Sinus rhythm and sinus griffin    MEDICATIONS  (STANDING):  MEDICATIONS  (STANDING):  lisinopril 10milliGRAM(s) Oral daily  carBAMazepine XR Tablet 400milliGRAM(s) Oral two times a day  levETIRAcetam 500milliGRAM(s) Oral two times a day  atorvastatin 80milliGRAM(s) Oral at bedtime  furosemide    Tablet 40milliGRAM(s) Oral daily    MEDICATIONS  (PRN):  heparin  Injectable 07596Zibq(s) IV Push every 6 hours PRN For aPTT less than 40  heparin  Injectable 5000Unit(s) IV Push every 6 hours PRN For aPTT between 40 - 57      Allergies    No Known Allergies        Vital Signs Last 24 Hrs  Vital Signs Last 24 Hrs  T(C): 36.7, Max: 36.9 (06-08 @ 20:13)  T(F): 98, Max: 98.4 (06-08 @ 20:13)  HR: 76 (66 - 76)  BP: 137/80 (120/64 - 137/80)  BP(mean): --  RR: 18 (18 - 18)  SpO2: 96% (96% - 96%)    ON EXAM:    General: NAD, awake and alert, oriented x 3  HEENT: Mucous membranes are moist, anicteric  Lungs: Non-labored, breath sounds are clear bilaterally, No wheezing, rales or rhonchi.  Decreased breath sounds bilaterally  Cardiovascular: Regular, S1 and S2, no murmurs, rubs, or gallops. Distant heart sounds  Gastrointestinal: Bowel Sounds present, soft, nontender.   Obese  Lymph: 2+ peripheral edema. No lymphadenopathy.  Skin: b/l LE venous stasis changes  Psych:  Mood & affect appropriate    LABS: All Labs Reviewed:                                    11.5   5.7   )-----------( 208      ( 09 Jun 2017 09:25 )             34.7   06-08    137  |  100  |  17  ----------------------------<  119<H>  4.5   |  23  |  0.81    Ca    8.7      08 Jun 2017 09:41    TPro  7.9  /  Alb  3.6  /  TBili  0.3  /  DBili  x   /  AST  18  /  ALT  17  /  AlkPhos  100  06-08          Assessment/Plan: 67 y.o. male history of a CVA with left hemiparesis, DVT's in the past with PE's, morbid obesity, sz d/o, expressive aphasia who presents with acute DVT having had warfarin dc 2w ago, and with pancreatic mass susp for neoplasm:    -no acute ischemia  -no signif arrhythmias noted, cont tele monitoring  -edema with venous stasis, can cont po lasix  -for eus today to work up pancreatic mass.  optimized for the procedure from a cardiac point of view with no evidence of active cardiac conditions.  patient is at high risk given his comorbidities  -heparin gtt on hold for procedure.  resume post.  eventual resumption of coumadin pending his clinical course  -echo unrevealing  -will follow

## 2017-06-09 NOTE — PROGRESS NOTE ADULT - SUBJECTIVE AND OBJECTIVE BOX
Pre-Endoscopy Evaluation      Referring Physician:            Hansel                        Procedure: egd/ eus fna    Indication for Procedure: pancreatic mass    Pertinent History: history of panc mass, increased in size, heparin held, pulmonary cleared    Sedation by Anesthesia [x ]    PAST MEDICAL & SURGICAL HISTORY:  Pulmonary embolism  Obesity  Seizure disorder  Hemiplegia Affecting Dominant Side  Aphasia: Expressive  Hypercholesteremia  CVA (Cerebral Vascular Accident)  Pulmonary embolism: [place] filter  S/P cataract extraction and insertion of intraocular lens, right: 2010  No Past Surgical History      PMH of Gastroparesis [ ]  Gastric Surgery [ ]  Gastric Outlet Obstruction [ ]    Allergies    No Known Allergies    Intolerances        Latex allergy: [ ] yes [x ] no    Medications:MEDICATIONS  (STANDING):  lisinopril 10milliGRAM(s) Oral daily  carBAMazepine XR Tablet 400milliGRAM(s) Oral two times a day  levETIRAcetam 500milliGRAM(s) Oral two times a day  atorvastatin 80milliGRAM(s) Oral at bedtime  furosemide    Tablet 40milliGRAM(s) Oral daily    MEDICATIONS  (PRN):  heparin  Injectable 71335Mbmf(s) IV Push every 6 hours PRN For aPTT less than 40  heparin  Injectable 5000Unit(s) IV Push every 6 hours PRN For aPTT between 40 - 57      Smoking: [ ] yes  [ ] no    AICD/PPM: [ ] yes   [ ] no    Pertinent lab data:                        11.5   5.7   )-----------( 208      ( 2017 09:25 )             34.7         137  |  100  |  17  ----------------------------<  119<H>  4.5   |  23  |  0.81    Ca    8.7      2017 09:41    TPro  7.9  /  Alb  3.6  /  TBili  0.3  /  DBili  x   /  AST  18  /  ALT  17  /  AlkPhos  100  -    PTT - ( 2017 23:09 )  PTT:67.5 sec                Physical Examination:  Daily     Daily Weight in k.7 (2017 10:48)  Vital Signs Last 24 Hrs  T(C): 36.6, Max: 36.9 ( @ 20:13)  T(F): 97.8, Max: 98.4 ( @ 20:13)  HR: 55 (55 - 76)  BP: 111/68 (111/68 - 137/80)  BP(mean): --  RR: 18 (18 - 18)  SpO2: 98% (96% - 98%)        Constitutional: NAD    HEENT: PERRLA, EOMI,       Neck:  No JVD    Respiratory: CTAB/L    Cardiovascular: S1 and S2    Gastrointestinal: BS+, soft, NT/ND    Extremities: No peripheral edema    Neurological: A/O x 3, no focal deficits    Psychiatric: Normal mood, normal affect    : No [place]ll[place]    Skin: No rashes    Comments:    ASA Class: I [ ]  II [ ]  III [ ]  IV [ x]    The patient is a suitable candidate for the planned procedure unless box checked [ ]  No, explain:

## 2017-06-09 NOTE — PROGRESS NOTE ADULT - SUBJECTIVE AND OBJECTIVE BOX
INTERVAL HPI/OVERNIGHT EVENTS: No GI complaints. Aware of panc mass which has enlarged on CT from . No symptoms. Discussed need for EUS/FNA when can hold anticoagulation. Tumor markers negative.     MEDICATIONS  (STANDING):  lisinopril 10milliGRAM(s) Oral daily  carBAMazepine XR Tablet 400milliGRAM(s) Oral two times a day  levETIRAcetam 500milliGRAM(s) Oral two times a day  atorvastatin 80milliGRAM(s) Oral at bedtime  furosemide    Tablet 40milliGRAM(s) Oral daily    MEDICATIONS  (PRN):  heparin  Injectable 47808Tlhh(s) IV Push every 6 hours PRN For aPTT less than 40  heparin  Injectable 5000Unit(s) IV Push every 6 hours PRN For aPTT between 40 - 57      Allergies    No Known Allergies    Intolerances            PHYSICAL EXAM:   Vital Signs:  Vital Signs Last 24 Hrs  T(C): 36.7, Max: 36.9 ( @ 20:13)  T(F): 98, Max: 98.4 ( @ 20:13)  HR: 76 (66 - 76)  BP: 137/80 (120/64 - 137/80)  BP(mean): --  RR: 18 (18 - 18)  SpO2: 96% (96% - 96%)  Daily     Daily     GENERAL:  no distress  HEENT:  NC/AT,  anicteric  CHEST:   no increased effort  HEART:  Regular rhythm  ABDOMEN:  Soft, non-tender, non-distended, normoactive bowel sounds,  no masses ,no hepato-splenomegaly,obesity limits exam  EXTEREMITIES:  marked edema with chronic venous stasis RLE      LABS:                        11.9   7.76  )-----------( 235      ( 2017 09:40 )             36.5         137  |  100  |  17  ----------------------------<  119<H>  4.5   |  23  |  0.81    Ca    8.7      2017 09:41    TPro  7.9  /  Alb  3.6  /  TBili  0.3  /  DBili  x   /  AST  18  /  ALT  17  /  AlkPhos  100      PT/INR - ( 2017 12:32 )   PT: 12.6 sec;   INR: 1.16 ratio         PTT - ( 2017 23:09 )  PTT:67.5 sec  Urinalysis Basic - ( 2017 14:02 )    Color: Yellow / Appearance: Clear / S.030 / pH: x  Gluc: x / Ketone: Trace  / Bili: Negative / Urobili: 1   Blood: x / Protein: 100 mg/dL / Nitrite: Negative   Leuk Esterase: Trace / RBC: 3-5 /HPF / WBC 6-10 /HPF   Sq Epi: x / Non Sq Epi: OCC /HPF / Bacteria: x        RADIOLOGY & ADDITIONAL TESTS:

## 2017-06-10 LAB
APTT BLD: 38.1 SEC — HIGH (ref 27.5–37.4)
APTT BLD: 50.1 SEC — HIGH (ref 27.5–37.4)
APTT BLD: 93.1 SEC — HIGH (ref 27.5–37.4)
HCT VFR BLD CALC: 34.6 % — LOW (ref 39–50)
HGB BLD-MCNC: 11.3 G/DL — LOW (ref 13–17)
MCHC RBC-ENTMCNC: 31.7 PG — SIGNIFICANT CHANGE UP (ref 27–34)
MCHC RBC-ENTMCNC: 32.7 GM/DL — SIGNIFICANT CHANGE UP (ref 32–36)
MCV RBC AUTO: 96.9 FL — SIGNIFICANT CHANGE UP (ref 80–100)
PLATELET # BLD AUTO: 199 K/UL — SIGNIFICANT CHANGE UP (ref 150–400)
RBC # BLD: 3.57 M/UL — LOW (ref 4.2–5.8)
RBC # FLD: 13 % — SIGNIFICANT CHANGE UP (ref 10.3–14.5)
WBC # BLD: 6.63 K/UL — SIGNIFICANT CHANGE UP (ref 3.8–10.5)
WBC # FLD AUTO: 6.63 K/UL — SIGNIFICANT CHANGE UP (ref 3.8–10.5)

## 2017-06-10 PROCEDURE — 99233 SBSQ HOSP IP/OBS HIGH 50: CPT

## 2017-06-10 RX ORDER — HEPARIN SODIUM 5000 [USP'U]/ML
1700 INJECTION INTRAVENOUS; SUBCUTANEOUS
Qty: 25000 | Refills: 0 | Status: DISCONTINUED | OUTPATIENT
Start: 2017-06-10 | End: 2017-06-16

## 2017-06-10 RX ORDER — HEPARIN SODIUM 5000 [USP'U]/ML
10000 INJECTION INTRAVENOUS; SUBCUTANEOUS EVERY 6 HOURS
Qty: 0 | Refills: 0 | Status: DISCONTINUED | OUTPATIENT
Start: 2017-06-10 | End: 2017-06-16

## 2017-06-10 RX ORDER — SODIUM CHLORIDE 9 MG/ML
3 INJECTION INTRAMUSCULAR; INTRAVENOUS; SUBCUTANEOUS EVERY 8 HOURS
Qty: 0 | Refills: 0 | Status: DISCONTINUED | OUTPATIENT
Start: 2017-06-10 | End: 2017-06-16

## 2017-06-10 RX ORDER — HEPARIN SODIUM 5000 [USP'U]/ML
5000 INJECTION INTRAVENOUS; SUBCUTANEOUS EVERY 6 HOURS
Qty: 0 | Refills: 0 | Status: DISCONTINUED | OUTPATIENT
Start: 2017-06-10 | End: 2017-06-16

## 2017-06-10 RX ADMIN — HEPARIN SODIUM 2000 UNIT(S)/HR: 5000 INJECTION INTRAVENOUS; SUBCUTANEOUS at 12:52

## 2017-06-10 RX ADMIN — Medication 200 MILLIGRAM(S): at 17:24

## 2017-06-10 RX ADMIN — LEVETIRACETAM 500 MILLIGRAM(S): 250 TABLET, FILM COATED ORAL at 17:25

## 2017-06-10 RX ADMIN — SODIUM CHLORIDE 3 MILLILITER(S): 9 INJECTION INTRAMUSCULAR; INTRAVENOUS; SUBCUTANEOUS at 13:00

## 2017-06-10 RX ADMIN — HEPARIN SODIUM 5000 UNIT(S): 5000 INJECTION INTRAVENOUS; SUBCUTANEOUS at 12:53

## 2017-06-10 RX ADMIN — HEPARIN SODIUM 2000 UNIT(S)/HR: 5000 INJECTION INTRAVENOUS; SUBCUTANEOUS at 19:13

## 2017-06-10 RX ADMIN — LISINOPRIL 10 MILLIGRAM(S): 2.5 TABLET ORAL at 05:03

## 2017-06-10 RX ADMIN — Medication 40 MILLIGRAM(S): at 05:03

## 2017-06-10 RX ADMIN — Medication 400 MILLIGRAM(S): at 17:25

## 2017-06-10 RX ADMIN — SODIUM CHLORIDE 3 MILLILITER(S): 9 INJECTION INTRAMUSCULAR; INTRAVENOUS; SUBCUTANEOUS at 21:00

## 2017-06-10 RX ADMIN — ATORVASTATIN CALCIUM 80 MILLIGRAM(S): 80 TABLET, FILM COATED ORAL at 21:10

## 2017-06-10 RX ADMIN — Medication 400 MILLIGRAM(S): at 05:03

## 2017-06-10 RX ADMIN — LEVETIRACETAM 500 MILLIGRAM(S): 250 TABLET, FILM COATED ORAL at 05:03

## 2017-06-10 RX ADMIN — HEPARIN SODIUM 1700 UNIT(S)/HR: 5000 INJECTION INTRAVENOUS; SUBCUTANEOUS at 06:21

## 2017-06-10 RX ADMIN — Medication 200 MILLIGRAM(S): at 09:24

## 2017-06-10 NOTE — PROGRESS NOTE ADULT - ASSESSMENT
67M w/ non-obstructing pancreatic body mass s/p EUS w/ biopsy   - Elevated CA 19-9   - F/u path.   - Diet as tolerated.

## 2017-06-10 NOTE — PROGRESS NOTE ADULT - SUBJECTIVE AND OBJECTIVE BOX
[facility] - Yan Loaiza, Carly, Jen, Tomy Azevedo    Patient resting comfortably in bed in NAD.  Laying flat with no respiratory distress.  No complaints of chest pain, dyspnea, palpitations, PND, or orthopnea.  s/p EUS with biopsy.  Tolerated well.    Telemetry:  NSR.    MEDICATIONS  (STANDING):  lisinopril 10milliGRAM(s) Oral daily  carBAMazepine XR Tablet 400milliGRAM(s) Oral two times a day  levETIRAcetam 500milliGRAM(s) Oral two times a day  atorvastatin 80milliGRAM(s) Oral at bedtime  furosemide    Tablet 40milliGRAM(s) Oral daily  ciprofloxacin   IVPB 400milliGRAM(s) IV Intermittent every 12 hours  heparin  Infusion. 1700Unit(s)/Hr IV Continuous <Continuous>    MEDICATIONS  (PRN):  heparin  Injectable 37984Mfxo(s) IV Push every 6 hours PRN For aPTT less than 40  heparin  Injectable 5000Unit(s) IV Push every 6 hours PRN For aPTT between 40 - 57      Allergies    No Known Allergies        Vital Signs Last 24 Hrs  T(C): 36.7, Max: 36.7 (06-09 @ 19:57)  T(F): 98.1, Max: 98.1 (06-10 @ 04:46)  HR: 79 (55 - 79)  BP: 131/66 (111/68 - 131/66)  BP(mean): --  RR: 18 (18 - 18)  SpO2: 98% (97% - 98%)    General: NAD, awake and alert, oriented x 3  HEENT: Mucous membranes are moist, anicteric  Lungs: Non-labored, breath sounds are clear bilaterally, No wheezing, rales or rhonchi.  Decreased breath sounds bilaterally  Cardiovascular: Regular, S1 and S2, no murmurs, rubs, or gallops. Distant heart sounds  Gastrointestinal: Bowel Sounds present, soft, nontender.   Obese  Lymph: 2+ peripheral edema. No lymphadenopathy.  Skin: b/l LE venous stasis changes  Psych:  Mood & affect appropriate      I&O's Summary    I & Os for current day (as of 10 Trevon 2017 07:12)  =============================================  IN: 208.5 ml / OUT: 1880 ml / NET: -1671.5 ml      ON EXAM:      LABS: All Labs Reviewed:                        11.5   5.7   )-----------( 208      ( 09 Jun 2017 09:25 )             34.7                         11.9   7.76  )-----------( 235      ( 08 Jun 2017 09:40 )             36.5                         11.8   8.9   )-----------( 207      ( 08 Jun 2017 00:41 )             34.1     08 Jun 2017 09:41    137    |  100    |  17     ----------------------------<  119    4.5     |  23     |  0.81   07 Jun 2017 12:32    141    |  103    |  23     ----------------------------<  129    4.8     |  22     |  0.97     Ca    8.7        08 Jun 2017 09:41  Ca    8.7        07 Jun 2017 12:32    TPro  7.9    /  Alb  3.6    /  TBili  0.3    /  DBili  x      /  AST  18     /  ALT  17     /  AlkPhos  100    08 Jun 2017 09:41  TPro  8.2    /  Alb  3.8    /  TBili  0.2    /  DBili  x      /  AST  21     /  ALT  22     /  AlkPhos  102    07 Jun 2017 12:32    PTT - ( 08 Jun 2017 23:09 )  PTT:67.5 sec      Blood Culture:   06-07 @ 12:32  Pro Bnp 51        Assessment/Plan:  67 y.o. male history of a CVA with left hemiparesis, DVT's in the past with PE's, morbid obesity, sz d/o, expressive aphasia who presents with acute DVT having had warfarin dc 2w ago, and with pancreatic mass susp for neoplasm, s/p EUS with biopsy:    -tolerated procedure well with no cardiac complications  -no acute ischemia  -no signif arrhythmias noted, can d/c telemetry monitor  -edema with venous stasis, can cont po lasix  --heparin gtt to be continued with goal PTT 60-90.  Eventual resume Coumadin.    -echo unrevealing  -monitor and replete electrolytes  -supp oxygen as needed  -will follow

## 2017-06-10 NOTE — PROGRESS NOTE ADULT - SUBJECTIVE AND OBJECTIVE BOX
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  Spoke with Dr. Hamm and Rosio yesterday  await cea fluid and cytology.  likely above.  growth concerning, but poor surgical candidate based on obesity and  inability to move 2' cva hemiparesis.  conservative managemetn likely best.

## 2017-06-10 NOTE — PROGRESS NOTE ADULT - SUBJECTIVE AND OBJECTIVE BOX
cc : worsening lower ext pain     SUBJECTIVE / OVERNIGHT EVENTS: denies chest pain, shortness of breath, nausea,v , weakness in lower ext        MEDICATIONS  (STANDING):  heparin  Infusion. Unit(s)/Hr IV Continuous <Continuous>  lisinopril 10milliGRAM(s) Oral daily  carBAMazepine XR Tablet 400milliGRAM(s) Oral two times a day  levETIRAcetam 500milliGRAM(s) Oral two times a day  atorvastatin 80milliGRAM(s) Oral at bedtime  furosemide    Tablet 40milliGRAM(s) Oral daily    MEDICATIONS  (PRN):  heparin  Injectable 01372Syla(s) IV Push every 6 hours PRN For aPTT less than 40  heparin  Injectable 5000Unit(s) IV Push every 6 hours PRN For aPTT between 40 - 57    Vital Signs Last 24 Hrs  T(C): 36.7, Max: 36.7 (06-09 @ 19:57)  T(F): 98.1, Max: 98.1 (0610 @ 04:46)  HR: 57 (57 - 79)  BP: 120/74 (120/74 - 131/66)  BP(mean): --  RR: 18 (18 - 18)  SpO2: 97% (97% - 98%)    Constitutional: No fever, fatigue or weight loss.  Skin: No rash.  Eyes: No recent vision problems or eye pain.  ENT: No congestion, ear pain, or sore throat.  Endocrine: No thyroid problems.  Cardiovascular: No chest pain or palpation.  Respiratory: No cough, shortness of breath, congestion, or wheezing.  Gastrointestinal: No abdominal pain, nausea, vomiting, or diarrhea.  Genitourinary: No dysuria.  Musculoskeletal: No joint swelling.  Neurologic: No headache.    PHYSICAL EXAM:  GENERAL: morbidly obese    EYES: EOMI, PERRLA, conjunctiva and sclera clear  NECK: Supple, No JVD  CHEST/LUNG: dec breath sounds at bases  HEART: Regular rate and rhythm;   ABDOMEN: Soft, Nontender, mild distenson+; Bowel sounds present  EXTREMITIES:  2+ Peripheral Pulses, No clubbing, cyanosis, or edema  Neuro : alert, awake calm , coopertive  SKIN: erythematous  rodolfo lower ext , chronic venous stasis changes +, fungal toe nails, dry scaly skin over rt foot          LABS:        Creatinine Trend: 0.81 <--, 0.97 <--                        11.3   6.63  )-----------( 199      ( 10 Trevon 2017 11:18 )             34.6     Urine Studies:  Urinalysis Basic - ( 2017 14:02 )    Color: Yellow / Appearance: Clear / S.030 / pH:   Gluc:  / Ketone: Trace  / Bili: Negative / Urobili: 1   Blood:  / Protein: 100 mg/dL / Nitrite: Negative   Leuk Esterase: Trace / RBC: 3-5 /HPF / WBC 6-10 /HPF   Sq Epi:  / Non Sq Epi: OCC /HPF / Bacteria:                 PTT - ( 10 Trevon 2017 12:23 )  PTT:50.1 sec

## 2017-06-10 NOTE — PROGRESS NOTE ADULT - SUBJECTIVE AND OBJECTIVE BOX
No acute events overnight. Reports feeling ok after endoscopy (EUS). No nausea, no vomiting.   No fevers, no chills    Vital Signs Last 24 Hrs  T(C): 36.7, Max: 36.7 (06-09 @ 05:07)  HR: 62 (55 - 76)  BP: 131/64 (111/68 - 137/80)  RR: 18 (18 - 18)  SpO2: 97% (96% - 98%)      Physical Exam:   General: alert and oriented, NAD - morbidly obese  Resp: airway patent, respirations unlabored  CVS: regular rate and rhythm  Abdomen: Obese, soft, nontender, nondistended. No palpable organomegaly or masses. No rebound, no guarding.   Extremities: B/L lower extremities with severe venous stasis changes and hemosiderin deposition.  + LLE weakness (motor 1/5)     Labs:  6/10 - pending     6/9    WBC 5.7  T. bili 0.3          CA-19-9 42.5

## 2017-06-10 NOTE — PROGRESS NOTE ADULT - SUBJECTIVE AND OBJECTIVE BOX
Vascular Medicine Inpatient Progress Note    Asked by Dr. Hamm to evaluate patient with acute DVT.    s/p EUS with biopsy--tolerated well.  Remains hemodynamically stable.  Tolerating anticoagulation.        EXAM:  CT ANGIO CHEST (W)AW IC                            PROCEDURE DATE:  06/07/2017        INTERPRETATION:  CLINICAL INFORMATION: Palpitations and leg pain. History   of PE recently taken off of anticoagulation.    COMPARISON: CT chest February 5, 2010     PROCEDURE:   CTA of the Chest was performed withintravenous contrast.  90 ml of Omnipaque 350 was injected intravenously. 10 ml were discarded.  Sagittal and coronal reformats were performed as well as 3D   Reconstructions.    FINDINGS:    CHEST:     LUNGS AND LARGE AIRWAYS: Patent central airways. Right upper lobe nodule   (8:58) measures 7 mm, unchanged from the prior study.  PLEURA: No pleural effusion.  VESSELS: There is no central, lobar, or segmental pulmonary embolus.   Evaluation of the subsegmental branches is limited secondary to body   habitus and motion artifact. Atherosclerotic calcifications of the aorta.  HEART: Heart size is normal.No pericardial effusion.  MEDIASTINUM AND WALTER: No lymphadenopathy.  CHEST WALL AND LOWER NECK: Within normal limits.  VISUALIZED UPPER ABDOMEN:Fatty atrophy of the pancreas. Indeterminant an   ill-defined soft tissue density pancreatic body lesion measuring 2.5 x   2.9 x 3.3 cm with interval increase in size since February 5, 2010   measured about 2 x 1.5 cm.  BONES: Within normal limits.    IMPRESSION:   1.  No central, lobar, or segmental pulmonary embolus. Evaluation of the   subsegmental branches is limited secondary to body habitus and motion   artifact.  2.  Soft tissue density pancreatic body lesion measuring 2.5 x 2.9 x 3.3   cmwith interval increase in size since February 5, 2010 worrisome for   neoplastic etiology. GI consultation is recommended.      ***Please see the addendum at the top of this report. It may contain   additional important information or changes.****      LINDA MUNOZ M.D., RADIOLOGY RESIDENT  This document has been electronically signed.  ANUSHKA WINTERS M.D. ATTENDING RADIOLOGIST  This document has been electronically signed. Jun 7 2017  6:58PM  Addend:  ANUSHKA WINTERS M.D. ATTENDING RADIOLOGIST  This addendum was electronically signed on: Jun 7 2017  8:14PM.            EXAM:  DUPLEX SCAN EXT VEINS LOWER BI                            PROCEDURE DATE:  06/07/2017        INTERPRETATION:  History:On a prior examination dated 3/2/2011, a short   segment of nonocclusive thrombus was identified in the right common   femoral vein, IVC filter placed, recently anticoagulation medicines had   been halted, left lower extremity swelling, rule out DVT.    There is edema within the superficial soft tissues of the left lower   extremity.    On the right, there remains intraluminal filling defects similar in   appearance to those 6 years earlier, representing the residue of prior   DVT.    There is acute DVT, almost occlusive to flow, affecting the left femoral   and common femoral veins.    The left greater saphenous vein, a superficial vein, is also thrombosed.    The calf veins were not diagnostically imaged.    IMPRESSION: There is acute almost occlusive DVT affecting the left   femoral and common femoral veins.  Superficial thrombophlebitis affects the left greater saphenous vein.      MEDICATIONS:  MEDICATIONS  (STANDING):  lisinopril 10milliGRAM(s) Oral daily  carBAMazepine XR Tablet 400milliGRAM(s) Oral two times a day  levETIRAcetam 500milliGRAM(s) Oral two times a day  atorvastatin 80milliGRAM(s) Oral at bedtime  furosemide    Tablet 40milliGRAM(s) Oral daily  ciprofloxacin   IVPB 400milliGRAM(s) IV Intermittent every 12 hours  heparin  Infusion. 1700Unit(s)/Hr IV Continuous <Continuous>  sodium chloride 0.9% lock flush 3milliLiter(s) IV Push every 8 hours    MEDICATIONS  (PRN):  heparin  Injectable 80575Apsx(s) IV Push every 6 hours PRN For aPTT less than 40  heparin  Injectable 5000Unit(s) IV Push every 6 hours PRN For aPTT between 40 - 57        PHYSICAL EXAM:  Vital Signs Last 24 Hrs  T(C): 36.7, Max: 36.7 (06-09 @ 19:57)  T(F): 98.1, Max: 98.1 (06-10 @ 04:46)  HR: 57 (57 - 79)  BP: 120/74 (120/74 - 131/66)  BP(mean): --  RR: 18 (18 - 18)  SpO2: 97% (97% - 98%)    Appearance: with morbid obesity, appears chronically ill  HEENT:   Normal oral mucosa, PERRL, EOMI	  Lymphatic: No obvious lymphadenopathy  Cardiovascular: Normal S1 S2, No JVD, No murmurs,  Respiratory: Decreased BS anteriorly, poor inspiratory effort	  Psychiatry: A & O x 3, Mood & affect appropriate  Gastrointestinal:  Obese, otherwise soft, Non-tender, + BS	  Skin: +chronic venous stasis changes LLE>RLE, skin discoloration	  Neurologic: +B/L weakness  Extremities: Edema as noted above.      LABS:	 	                          11.3   6.63  )-----------( 199      ( 10 Trevon 2017 11:18 )             34.6       Patient name: CYNTHIA TAPIA  YOB: 1950   Age: 67 (M)   MR#: [id number]  Study Date: 6/8/2017  Location: [address] [place], [place] [id number]: Alyson Emerson RDCS  Study quality: Technically difficult  Referring Physician: Bipin Hamm MD  Blood Pressure: 114/72 mmHg  Height: 180 cm  Weight: 192 kg  BSA: 2.9 m2  ------------------------------------------------------------------------  PROCEDURE: Transthoracic echocardiogram with 2-D, M-Mode  and complete spectral and color flow Doppler. Verbal  consent was obtained for injection of echo contrast  following a discussion of risks and benefits. Following  intravenous injection of contrast, harmonic imaging was  performed.  INDICATION: Chest pain, unspecified (R07.9)  ------------------------------------------------------------------------  Dimensions:    Normal Values:  LA:            2.0 - 4.0 cm  Ao:     3.4    2.0 - 3.8 cm  SEPTUM: 1.1    0.6 - 1.2 cm  PWT:    1.2    0.6 - 1.1 cm  LVIDd:  5.5    3.0 - 5.6 cm  LVIDs:  3.2    1.8 - 4.0 cm  Derived variables:  LVMI: 89 g/m2  RWT: 0.43  Fractional short: 42 %  EF (Teicholtz): 72 %  Doppler Peak Velocity (m/sec): AoV=2.0  ------------------------------------------------------------------------  Observations:  Mitral Valve: Mitral annular calcification, otherwise  normal mitral valve. Minimal mitral regurgitation.  Aortic Valve/Aorta: Aortic valve not well visualized;  appears calcified. No aortic valve regurgitation seen. Peak  left ventricular outflow tract gradient equals 3 mm Hg,  mean gradient is equal to 1 mm Hg, LVOT velocity time  integral equals 16 cm.  Aortic Root: 3.4 cm.  LVOT diameter: 2.2 cm.  Left Atrium: Normal left atrium.  Left Ventricle: Endocardial visualization enhanced with  intravenous injection of echo contrast (Definity).  Endocardium not well visualized despite the use of echo  contrast. Grossly normal LV function. Suboptimal images  precludes accurage evaluation for regional wall motion  abnormalities. Normal left ventricular internal dimensions  and wall thicknesses.  Right Heart: Normal right atrium. The right ventricle is  not well visualized; grossly normal right ventricular  systolic function. Normal tricuspid valve. Minimal  tricuspid regurgitation. Pulmonic valve not well  visualized.  Pericardium/Pleura: Normal pericardium with no pericardial  effusion.  Hemodynamic: Estimated right atrial pressure is 8 mm Hg.  Estimated right ventricular systolic pressure equals 28 mm  Hg, assuming right atrial pressure equals 8 mm Hg,  consistent with normal pulmonary pressures.  ------------------------------------------------------------------------  Conclusions:  1. Normal left ventricular internal dimensions and wall  thicknesses.  2. Endocardial visualization enhanced with intravenous  injection of echo contrast (Definity). Endocardium not well  visualized despite the use of echo contrast. Grossly normal  LV function. Suboptimal images precludes accurage  evaluation for regional wall motion abnormalities.  3. The right ventricle is notwell visualized; grossly  normal right ventricular systolic function.  *** No previous Echo exam.  ------------------------------------------------------------------------  Confirmed on  6/8/2017 - 13:05:14 by Max Soliz M.D.  ------------------------------------------------------------------------      Provoked VTE -- on anticoagulation with heparin.  Hemodynamically stable, no suggestion of respiratory distress.    --Agree with heparin gtt.  Eventual transition to oral anticoagulant when able (GI/Gi sx evaluations ongoing--> probably poor surgical candidate)  --Do not recommend IVC filter as patient is likely hypercoagulable with underlying neoplasm.  --Wound care evaluation.  --Will follow with you--thank you.

## 2017-06-11 LAB
ANION GAP SERPL CALC-SCNC: 14 MMOL/L — SIGNIFICANT CHANGE UP (ref 5–17)
APTT BLD: 87.8 SEC — HIGH (ref 27.5–37.4)
BUN SERPL-MCNC: 14 MG/DL — SIGNIFICANT CHANGE UP (ref 7–23)
CALCIUM SERPL-MCNC: 8.5 MG/DL — SIGNIFICANT CHANGE UP (ref 8.4–10.5)
CHLORIDE SERPL-SCNC: 101 MMOL/L — SIGNIFICANT CHANGE UP (ref 96–108)
CO2 SERPL-SCNC: 22 MMOL/L — SIGNIFICANT CHANGE UP (ref 22–31)
CREAT SERPL-MCNC: 1.02 MG/DL — SIGNIFICANT CHANGE UP (ref 0.5–1.3)
GLUCOSE SERPL-MCNC: 100 MG/DL — HIGH (ref 70–99)
HCT VFR BLD CALC: 33.7 % — LOW (ref 39–50)
HGB BLD-MCNC: 11.1 G/DL — LOW (ref 13–17)
INR BLD: 1.17 RATIO — HIGH (ref 0.88–1.16)
MCHC RBC-ENTMCNC: 31.9 PG — SIGNIFICANT CHANGE UP (ref 27–34)
MCHC RBC-ENTMCNC: 32.9 GM/DL — SIGNIFICANT CHANGE UP (ref 32–36)
MCV RBC AUTO: 96.8 FL — SIGNIFICANT CHANGE UP (ref 80–100)
PLATELET # BLD AUTO: 205 K/UL — SIGNIFICANT CHANGE UP (ref 150–400)
POTASSIUM SERPL-MCNC: 4.2 MMOL/L — SIGNIFICANT CHANGE UP (ref 3.5–5.3)
POTASSIUM SERPL-SCNC: 4.2 MMOL/L — SIGNIFICANT CHANGE UP (ref 3.5–5.3)
PROTHROM AB SERPL-ACNC: 12.7 SEC — SIGNIFICANT CHANGE UP (ref 9.8–12.7)
RBC # BLD: 3.48 M/UL — LOW (ref 4.2–5.8)
RBC # FLD: 13 % — SIGNIFICANT CHANGE UP (ref 10.3–14.5)
SODIUM SERPL-SCNC: 137 MMOL/L — SIGNIFICANT CHANGE UP (ref 135–145)
WBC # BLD: 6.67 K/UL — SIGNIFICANT CHANGE UP (ref 3.8–10.5)
WBC # FLD AUTO: 6.67 K/UL — SIGNIFICANT CHANGE UP (ref 3.8–10.5)

## 2017-06-11 PROCEDURE — 99232 SBSQ HOSP IP/OBS MODERATE 35: CPT

## 2017-06-11 PROCEDURE — 99233 SBSQ HOSP IP/OBS HIGH 50: CPT

## 2017-06-11 RX ORDER — WARFARIN SODIUM 2.5 MG/1
5 TABLET ORAL ONCE
Qty: 0 | Refills: 0 | Status: COMPLETED | OUTPATIENT
Start: 2017-06-11 | End: 2017-06-11

## 2017-06-11 RX ADMIN — Medication 400 MILLIGRAM(S): at 05:29

## 2017-06-11 RX ADMIN — ATORVASTATIN CALCIUM 80 MILLIGRAM(S): 80 TABLET, FILM COATED ORAL at 21:39

## 2017-06-11 RX ADMIN — Medication 200 MILLIGRAM(S): at 17:10

## 2017-06-11 RX ADMIN — SODIUM CHLORIDE 3 MILLILITER(S): 9 INJECTION INTRAMUSCULAR; INTRAVENOUS; SUBCUTANEOUS at 05:31

## 2017-06-11 RX ADMIN — HEPARIN SODIUM 2000 UNIT(S)/HR: 5000 INJECTION INTRAVENOUS; SUBCUTANEOUS at 01:37

## 2017-06-11 RX ADMIN — LISINOPRIL 10 MILLIGRAM(S): 2.5 TABLET ORAL at 05:29

## 2017-06-11 RX ADMIN — LEVETIRACETAM 500 MILLIGRAM(S): 250 TABLET, FILM COATED ORAL at 17:10

## 2017-06-11 RX ADMIN — Medication 400 MILLIGRAM(S): at 17:10

## 2017-06-11 RX ADMIN — Medication 200 MILLIGRAM(S): at 05:29

## 2017-06-11 RX ADMIN — SODIUM CHLORIDE 3 MILLILITER(S): 9 INJECTION INTRAMUSCULAR; INTRAVENOUS; SUBCUTANEOUS at 13:34

## 2017-06-11 RX ADMIN — Medication 40 MILLIGRAM(S): at 05:30

## 2017-06-11 RX ADMIN — WARFARIN SODIUM 5 MILLIGRAM(S): 2.5 TABLET ORAL at 21:39

## 2017-06-11 RX ADMIN — LEVETIRACETAM 500 MILLIGRAM(S): 250 TABLET, FILM COATED ORAL at 05:29

## 2017-06-11 RX ADMIN — SODIUM CHLORIDE 3 MILLILITER(S): 9 INJECTION INTRAMUSCULAR; INTRAVENOUS; SUBCUTANEOUS at 21:34

## 2017-06-11 NOTE — PROGRESS NOTE ADULT - SUBJECTIVE AND OBJECTIVE BOX
CYNTHIA [id number]  67yMale  T(C): 36.3, Max: 36.8 (06-10 @ 21:10)  HR: 64 (57 - 77)  BP: 132/80 (120/74 - 135/68)  RR: 18 (18 - 18)  SpO2: 98% (97% - 98%)  Wt(kg): --I & Os for 24h ending 06-11 @ 07:00  =============================================  IN: 1942 ml / OUT: 2470 ml / NET: -528 ml    I & Os for current day (as of 06-11 @ 10:02)  =============================================  IN: 220 ml / OUT: 850 ml / NET: -630 ml    normal cephalic atraumatic  s1s2   clear to ascultation bilaterally  soft, non tender, non distended no guarding or rebound  no clubbing cyanosis or edema, dermopathy le  obese    a/p panc mass- panc cyst, likely mucinous await cea fluid and cytology

## 2017-06-11 NOTE — PROGRESS NOTE ADULT - SUBJECTIVE AND OBJECTIVE BOX
Vascular Medicine Inpatient Progress Note    Asked by Dr. Hamm to evaluate patient with acute DVT.    s/p EUS with biopsy--tolerated well.  Remains hemodynamically stable.  Tolerating anticoagulation.        EXAM:  CT ANGIO CHEST (W)AW IC                            PROCEDURE DATE:  06/07/2017        INTERPRETATION:  CLINICAL INFORMATION: Palpitations and leg pain. History   of PE recently taken off of anticoagulation.    COMPARISON: CT chest February 5, 2010     PROCEDURE:   CTA of the Chest was performed withintravenous contrast.  90 ml of Omnipaque 350 was injected intravenously. 10 ml were discarded.  Sagittal and coronal reformats were performed as well as 3D   Reconstructions.    FINDINGS:    CHEST:     LUNGS AND LARGE AIRWAYS: Patent central airways. Right upper lobe nodule   (8:58) measures 7 mm, unchanged from the prior study.  PLEURA: No pleural effusion.  VESSELS: There is no central, lobar, or segmental pulmonary embolus.   Evaluation of the subsegmental branches is limited secondary to body   habitus and motion artifact. Atherosclerotic calcifications of the aorta.  HEART: Heart size is normal.No pericardial effusion.  MEDIASTINUM AND WALTER: No lymphadenopathy.  CHEST WALL AND LOWER NECK: Within normal limits.  VISUALIZED UPPER ABDOMEN:Fatty atrophy of the pancreas. Indeterminant an   ill-defined soft tissue density pancreatic body lesion measuring 2.5 x   2.9 x 3.3 cm with interval increase in size since February 5, 2010   measured about 2 x 1.5 cm.  BONES: Within normal limits.    IMPRESSION:   1.  No central, lobar, or segmental pulmonary embolus. Evaluation of the   subsegmental branches is limited secondary to body habitus and motion   artifact.  2.  Soft tissue density pancreatic body lesion measuring 2.5 x 2.9 x 3.3   cmwith interval increase in size since February 5, 2010 worrisome for   neoplastic etiology. GI consultation is recommended.      ***Please see the addendum at the top of this report. It may contain   additional important information or changes.****      LINDA MUNOZ M.D., RADIOLOGY RESIDENT  This document has been electronically signed.  ANUSHKA WINTERS M.D. ATTENDING RADIOLOGIST  This document has been electronically signed. Jun 7 2017  6:58PM  Addend:  ANUSHKA WINTERS M.D. ATTENDING RADIOLOGIST  This addendum was electronically signed on: Jun 7 2017  8:14PM.            EXAM:  DUPLEX SCAN EXT VEINS LOWER BI                            PROCEDURE DATE:  06/07/2017        INTERPRETATION:  History:On a prior examination dated 3/2/2011, a short   segment of nonocclusive thrombus was identified in the right common   femoral vein, IVC filter placed, recently anticoagulation medicines had   been halted, left lower extremity swelling, rule out DVT.    There is edema within the superficial soft tissues of the left lower   extremity.    On the right, there remains intraluminal filling defects similar in   appearance to those 6 years earlier, representing the residue of prior   DVT.    There is acute DVT, almost occlusive to flow, affecting the left femoral   and common femoral veins.    The left greater saphenous vein, a superficial vein, is also thrombosed.    The calf veins were not diagnostically imaged.    IMPRESSION: There is acute almost occlusive DVT affecting the left   femoral and common femoral veins.  Superficial thrombophlebitis affects the left greater saphenous vein.      MEDICATIONS  (STANDING):  lisinopril 10milliGRAM(s) Oral daily  carBAMazepine XR Tablet 400milliGRAM(s) Oral two times a day  levETIRAcetam 500milliGRAM(s) Oral two times a day  atorvastatin 80milliGRAM(s) Oral at bedtime  furosemide    Tablet 40milliGRAM(s) Oral daily  ciprofloxacin   IVPB 400milliGRAM(s) IV Intermittent every 12 hours  heparin  Infusion. 1700Unit(s)/Hr IV Continuous <Continuous>  sodium chloride 0.9% lock flush 3milliLiter(s) IV Push every 8 hours    MEDICATIONS  (PRN):  heparin  Injectable 18190Tpcw(s) IV Push every 6 hours PRN For aPTT less than 40  heparin  Injectable 5000Unit(s) IV Push every 6 hours PRN For aPTT between 40 - 57      PHYSICAL EXAM:  Vital Signs Last 24 Hrs  T(C): 36.3, Max: 36.8 (06-10 @ 21:10)  T(F): 97.4, Max: 98.3 (06-10 @ 21:10)  HR: 64 (57 - 77)  BP: 132/80 (120/74 - 135/68)  BP(mean): --  RR: 18 (18 - 18)  SpO2: 98% (97% - 98%)    Appearance: with morbid obesity, appears chronically ill  HEENT:   Normal oral mucosa, PERRL, EOMI	  Lymphatic: No obvious lymphadenopathy  Cardiovascular: Normal S1 S2, No JVD, No murmurs,  Respiratory: Decreased BS anteriorly, poor inspiratory effort	  Psychiatry: A & O x 3, Mood & affect appropriate  Gastrointestinal:  Obese, otherwise soft, Non-tender, + BS	  Skin: +chronic venous stasis changes LLE>RLE, skin discoloration	  Neurologic: +B/L weakness  Extremities: Edema as noted above.      LABS:	 	                            11.3   6.63  )-----------( 199      ( 10 Trevon 2017 11:18 )             34.6     06-11    137  |  101  |  14  ----------------------------<  100<H>  4.2   |  22  |  1.02    Ca    8.5      11 Jun 2017 08:27        Patient name: CYNTHIA TAPIA  YOB: 1950   Age: 67 (M)   MR#: [id number]  Study Date: 6/8/2017  Location: [address] [place], [place] [id number]: Alyson Emerson RDCS  Study quality: Technically difficult  Referring Physician: Bipin Hamm MD  Blood Pressure: 114/72 mmHg  Height: 180 cm  Weight: 192 kg  BSA: 2.9 m2  ------------------------------------------------------------------------  PROCEDURE: Transthoracic echocardiogram with 2-D, M-Mode  and complete spectral and color flow Doppler. Verbal  consent was obtained for injection of echo contrast  following a discussion of risks and benefits. Following  intravenous injection of contrast, harmonic imaging was  performed.  INDICATION: Chest pain, unspecified (R07.9)  ------------------------------------------------------------------------  Dimensions:    Normal Values:  LA:            2.0 - 4.0 cm  Ao:     3.4    2.0 - 3.8 cm  SEPTUM: 1.1    0.6 - 1.2 cm  PWT:    1.2    0.6 - 1.1 cm  LVIDd:  5.5    3.0 - 5.6 cm  LVIDs:  3.2    1.8 - 4.0 cm  Derived variables:  LVMI: 89 g/m2  RWT: 0.43  Fractional short: 42 %  EF (Teicholtz): 72 %  Doppler Peak Velocity (m/sec): AoV=2.0  ------------------------------------------------------------------------  Observations:  Mitral Valve: Mitral annular calcification, otherwise  normal mitral valve. Minimal mitral regurgitation.  Aortic Valve/Aorta: Aortic valve not well visualized;  appears calcified. No aortic valve regurgitation seen. Peak  left ventricular outflow tract gradient equals 3 mm Hg,  mean gradient is equal to 1 mm Hg, LVOT velocity time  integral equals 16 cm.  Aortic Root: 3.4 cm.  LVOT diameter: 2.2 cm.  Left Atrium: Normal left atrium.  Left Ventricle: Endocardial visualization enhanced with  intravenous injection of echo contrast (Definity).  Endocardium not well visualized despite the use of echo  contrast. Grossly normal LV function. Suboptimal images  precludes accurage evaluation for regional wall motion  abnormalities. Normal left ventricular internal dimensions  and wall thicknesses.  Right Heart: Normal right atrium. The right ventricle is  not well visualized; grossly normal right ventricular  systolic function. Normal tricuspid valve. Minimal  tricuspid regurgitation. Pulmonic valve not well  visualized.  Pericardium/Pleura: Normal pericardium with no pericardial  effusion.  Hemodynamic: Estimated right atrial pressure is 8 mm Hg.  Estimated right ventricular systolic pressure equals 28 mm  Hg, assuming right atrial pressure equals 8 mm Hg,  consistent with normal pulmonary pressures.  ------------------------------------------------------------------------  Conclusions:  1. Normal left ventricular internal dimensions and wall  thicknesses.  2. Endocardial visualization enhanced with intravenous  injection of echo contrast (Definity). Endocardium not well  visualized despite the use of echo contrast. Grossly normal  LV function. Suboptimal images precludes accurage  evaluation for regional wall motion abnormalities.  3. The right ventricle is notwell visualized; grossly  normal right ventricular systolic function.  *** No previous Echo exam.  ------------------------------------------------------------------------  Confirmed on  6/8/2017 - 13:05:14 by Max Soliz M.D.  ------------------------------------------------------------------------      Provoked VTE -- on anticoagulation with heparin.  Hemodynamically stable, no suggestion of respiratory distress.    --Agree with heparin gtt.  Eventual transition to oral anticoagulant when able (GI/Gi sx evaluations ongoing--> probably poor surgical candidate)  --Do not recommend IVC filter as patient is likely hypercoagulable with underlying neoplasm.  --Wound care evaluation.  --Will follow with you--thank you. Vascular Medicine Inpatient Progress Note    Asked by Dr. Hamm to evaluate patient with acute DVT.    s/p EUS with biopsy--tolerated well.  Remains hemodynamically stable.  Tolerating anticoagulation.    Eventual start oral anticoagulant when GI/GI onc sx evaluation completed.        EXAM:  CT ANGIO CHEST (W)AW IC                            PROCEDURE DATE:  06/07/2017        INTERPRETATION:  CLINICAL INFORMATION: Palpitations and leg pain. History   of PE recently taken off of anticoagulation.    COMPARISON: CT chest February 5, 2010     PROCEDURE:   CTA of the Chest was performed withintravenous contrast.  90 ml of Omnipaque 350 was injected intravenously. 10 ml were discarded.  Sagittal and coronal reformats were performed as well as 3D   Reconstructions.    FINDINGS:    CHEST:     LUNGS AND LARGE AIRWAYS: Patent central airways. Right upper lobe nodule   (8:58) measures 7 mm, unchanged from the prior study.  PLEURA: No pleural effusion.  VESSELS: There is no central, lobar, or segmental pulmonary embolus.   Evaluation of the subsegmental branches is limited secondary to body   habitus and motion artifact. Atherosclerotic calcifications of the aorta.  HEART: Heart size is normal.No pericardial effusion.  MEDIASTINUM AND WALTER: No lymphadenopathy.  CHEST WALL AND LOWER NECK: Within normal limits.  VISUALIZED UPPER ABDOMEN:Fatty atrophy of the pancreas. Indeterminant an   ill-defined soft tissue density pancreatic body lesion measuring 2.5 x   2.9 x 3.3 cm with interval increase in size since February 5, 2010   measured about 2 x 1.5 cm.  BONES: Within normal limits.    IMPRESSION:   1.  No central, lobar, or segmental pulmonary embolus. Evaluation of the   subsegmental branches is limited secondary to body habitus and motion   artifact.  2.  Soft tissue density pancreatic body lesion measuring 2.5 x 2.9 x 3.3   cmwith interval increase in size since February 5, 2010 worrisome for   neoplastic etiology. GI consultation is recommended.      ***Please see the addendum at the top of this report. It may contain   additional important information or changes.****      LINDA MUNOZ M.D., RADIOLOGY RESIDENT  This document has been electronically signed.  ANUSHKA WINTERS M.D. ATTENDING RADIOLOGIST  This document has been electronically signed. Jun 7 2017  6:58PM  Addend:  ANUSHKA WINTERS M.D. ATTENDING RADIOLOGIST  This addendum was electronically signed on: Jun 7 2017  8:14PM.            EXAM:  DUPLEX SCAN EXT VEINS LOWER BI                            PROCEDURE DATE:  06/07/2017        INTERPRETATION:  History:On a prior examination dated 3/2/2011, a short   segment of nonocclusive thrombus was identified in the right common   femoral vein, IVC filter placed, recently anticoagulation medicines had   been halted, left lower extremity swelling, rule out DVT.    There is edema within the superficial soft tissues of the left lower   extremity.    On the right, there remains intraluminal filling defects similar in   appearance to those 6 years earlier, representing the residue of prior   DVT.    There is acute DVT, almost occlusive to flow, affecting the left femoral   and common femoral veins.    The left greater saphenous vein, a superficial vein, is also thrombosed.    The calf veins were not diagnostically imaged.    IMPRESSION: There is acute almost occlusive DVT affecting the left   femoral and common femoral veins.  Superficial thrombophlebitis affects the left greater saphenous vein.      MEDICATIONS  (STANDING):  lisinopril 10milliGRAM(s) Oral daily  carBAMazepine XR Tablet 400milliGRAM(s) Oral two times a day  levETIRAcetam 500milliGRAM(s) Oral two times a day  atorvastatin 80milliGRAM(s) Oral at bedtime  furosemide    Tablet 40milliGRAM(s) Oral daily  ciprofloxacin   IVPB 400milliGRAM(s) IV Intermittent every 12 hours  heparin  Infusion. 1700Unit(s)/Hr IV Continuous <Continuous>  sodium chloride 0.9% lock flush 3milliLiter(s) IV Push every 8 hours    MEDICATIONS  (PRN):  heparin  Injectable 54671Tdsu(s) IV Push every 6 hours PRN For aPTT less than 40  heparin  Injectable 5000Unit(s) IV Push every 6 hours PRN For aPTT between 40 - 57      PHYSICAL EXAM:  Vital Signs Last 24 Hrs  T(C): 36.3, Max: 36.8 (06-10 @ 21:10)  T(F): 97.4, Max: 98.3 (06-10 @ 21:10)  HR: 64 (57 - 77)  BP: 132/80 (120/74 - 135/68)  BP(mean): --  RR: 18 (18 - 18)  SpO2: 98% (97% - 98%)    Appearance: with morbid obesity, appears chronically ill  HEENT:   Normal oral mucosa, PERRL, EOMI	  Lymphatic: No obvious lymphadenopathy  Cardiovascular: Normal S1 S2, No JVD, No murmurs,  Respiratory: Decreased BS anteriorly, poor inspiratory effort	  Psychiatry: A & O x 3, Mood & affect appropriate  Gastrointestinal:  Obese, otherwise soft, Non-tender, + BS	  Skin: +chronic venous stasis changes LLE>RLE, skin discoloration	  Neurologic: +B/L weakness  Extremities: Edema as noted above.      LABS:	 	                            11.3   6.63  )-----------( 199      ( 10 Trevon 2017 11:18 )             34.6     06-11    137  |  101  |  14  ----------------------------<  100<H>  4.2   |  22  |  1.02    Ca    8.5      11 Jun 2017 08:27        Patient name: CYNTHIA TAPIA  YOB: 1950   Age: 67 (M)   MR#: [id number]  Study Date: 6/8/2017  Location: [address] [place], [place] [id number]: Alyson Emerson RDCS  Study quality: Technically difficult  Referring Physician: Bipin Hamm MD  Blood Pressure: 114/72 mmHg  Height: 180 cm  Weight: 192 kg  BSA: 2.9 m2  ------------------------------------------------------------------------  PROCEDURE: Transthoracic echocardiogram with 2-D, M-Mode  and complete spectral and color flow Doppler. Verbal  consent was obtained for injection of echo contrast  following a discussion of risks and benefits. Following  intravenous injection of contrast, harmonic imaging was  performed.  INDICATION: Chest pain, unspecified (R07.9)  ------------------------------------------------------------------------  Dimensions:    Normal Values:  LA:            2.0 - 4.0 cm  Ao:     3.4    2.0 - 3.8 cm  SEPTUM: 1.1    0.6 - 1.2 cm  PWT:    1.2    0.6 - 1.1 cm  LVIDd:  5.5    3.0 - 5.6 cm  LVIDs:  3.2    1.8 - 4.0 cm  Derived variables:  LVMI: 89 g/m2  RWT: 0.43  Fractional short: 42 %  EF (Teicholtz): 72 %  Doppler Peak Velocity (m/sec): AoV=2.0  ------------------------------------------------------------------------  Observations:  Mitral Valve: Mitral annular calcification, otherwise  normal mitral valve. Minimal mitral regurgitation.  Aortic Valve/Aorta: Aortic valve not well visualized;  appears calcified. No aortic valve regurgitation seen. Peak  left ventricular outflow tract gradient equals 3 mm Hg,  mean gradient is equal to 1 mm Hg, LVOT velocity time  integral equals 16 cm.  Aortic Root: 3.4 cm.  LVOT diameter: 2.2 cm.  Left Atrium: Normal left atrium.  Left Ventricle: Endocardial visualization enhanced with  intravenous injection of echo contrast (Definity).  Endocardium not well visualized despite the use of echo  contrast. Grossly normal LV function. Suboptimal images  precludes accurage evaluation for regional wall motion  abnormalities. Normal left ventricular internal dimensions  and wall thicknesses.  Right Heart: Normal right atrium. The right ventricle is  not well visualized; grossly normal right ventricular  systolic function. Normal tricuspid valve. Minimal  tricuspid regurgitation. Pulmonic valve not well  visualized.  Pericardium/Pleura: Normal pericardium with no pericardial  effusion.  Hemodynamic: Estimated right atrial pressure is 8 mm Hg.  Estimated right ventricular systolic pressure equals 28 mm  Hg, assuming right atrial pressure equals 8 mm Hg,  consistent with normal pulmonary pressures.  ------------------------------------------------------------------------  Conclusions:  1. Normal left ventricular internal dimensions and wall  thicknesses.  2. Endocardial visualization enhanced with intravenous  injection of echo contrast (Definity). Endocardium not well  visualized despite the use of echo contrast. Grossly normal  LV function. Suboptimal images precludes accurage  evaluation for regional wall motion abnormalities.  3. The right ventricle is notwell visualized; grossly  normal right ventricular systolic function.  *** No previous Echo exam.  ------------------------------------------------------------------------  Confirmed on  6/8/2017 - 13:05:14 by Max Soliz M.D.  ------------------------------------------------------------------------      Provoked VTE -- on anticoagulation with heparin.  Hemodynamically stable, no suggestion of respiratory distress.    --Agree with heparin gtt.  Eventual transition to oral anticoagulant when able (GI/Gi sx evaluations ongoing--> probably poor surgical candidate)  --Do not recommend IVC filter as patient is likely hypercoagulable with underlying neoplasm.  --Wound care evaluation.  --Will follow with you--thank you.

## 2017-06-11 NOTE — PROGRESS NOTE ADULT - SUBJECTIVE AND OBJECTIVE BOX
[facility] - Yan Loaiza, Carly, Jen, Tomy Azevedo    Patient resting comfortably in bed in NAD.  Laying flat with no respiratory distress.  No complaints of chest pain, dyspnea, palpitations, PND, or orthopnea.    Telemetry:  NSR 50-60    MEDICATIONS  (STANDING):  lisinopril 10milliGRAM(s) Oral daily  carBAMazepine XR Tablet 400milliGRAM(s) Oral two times a day  levETIRAcetam 500milliGRAM(s) Oral two times a day  atorvastatin 80milliGRAM(s) Oral at bedtime  furosemide    Tablet 40milliGRAM(s) Oral daily  ciprofloxacin   IVPB 400milliGRAM(s) IV Intermittent every 12 hours  heparin  Infusion. 1700Unit(s)/Hr IV Continuous <Continuous>  sodium chloride 0.9% lock flush 3milliLiter(s) IV Push every 8 hours    MEDICATIONS  (PRN):  heparin  Injectable 22249Puih(s) IV Push every 6 hours PRN For aPTT less than 40  heparin  Injectable 5000Unit(s) IV Push every 6 hours PRN For aPTT between 40 - 57      Allergies    No Known Allergies        Vital Signs Last 24 Hrs  T(C): 36.3, Max: 36.8 (06-10 @ 21:10)  T(F): 97.4, Max: 98.3 (06-10 @ 21:10)  HR: 64 (57 - 77)  BP: 132/80 (120/74 - 135/68)  BP(mean): --  RR: 18 (18 - 18)  SpO2: 98% (97% - 98%)    I&O's Summary    I & Os for current day (as of 11 Jun 2017 07:16)  =============================================  IN: 1942 ml / OUT: 2470 ml / NET: -528 ml      ON EXAM:  General: NAD, awake and alert, oriented x 3  HEENT: Mucous membranes are moist, anicteric  Lungs: Non-labored, breath sounds are clear bilaterally, No wheezing, rales or rhonchi.  Decreased breath sounds bilaterally  Cardiovascular: Regular, S1 and S2, no murmurs, rubs, or gallops. Distant heart sounds  Gastrointestinal: Bowel Sounds present, soft, nontender.   Obese  Lymph: 2+ peripheral edema. No lymphadenopathy.  Skin: b/l LE venous stasis changes      LABS: All Labs Reviewed:                        11.3   6.63  )-----------( 199      ( 10 Trevon 2017 11:18 )             34.6                         11.5   5.7   )-----------( 208      ( 09 Jun 2017 09:25 )             34.7                         11.9   7.76  )-----------( 235      ( 08 Jun 2017 09:40 )             36.5     08 Jun 2017 09:41    137    |  100    |  17     ----------------------------<  119    4.5     |  23     |  0.81     Ca    8.7        08 Jun 2017 09:41    TPro  7.9    /  Alb  3.6    /  TBili  0.3    /  DBili  x      /  AST  18     /  ALT  17     /  AlkPhos  100    08 Jun 2017 09:41    PTT - ( 11 Jun 2017 01:10 )  PTT:87.8 sec      Blood Culture:         Assessment/Plan:  67 y.o. male history of a CVA with left hemiparesis, DVT's in the past with PE's, morbid obesity, sz d/o, expressive aphasia who presents with acute DVT having had warfarin dc 2w ago, and with pancreatic mass susp for neoplasm, s/p EUS with biopsy, results suspicious for pancreatic mucinous tumor:    -tolerated procedure well with no cardiac complications  -no acute ischemia  -no significant arrhythmias noted, can d/c telemetry monitor  -edema with venous stasis, can cont po lasix  --heparin gtt to be continued with goal PTT 60-90.  Eventual resume oral AC per vascular medicine   -echo unrevealing  -monitor and replete electrolytes  -supp oxygen as needed  -will follow

## 2017-06-11 NOTE — PROGRESS NOTE ADULT - SUBJECTIVE AND OBJECTIVE BOX
cc : worsening lower ext pain     SUBJECTIVE / OVERNIGHT EVENTS: denies chest pain, shortness of breath, nausea,v , weakness in lower ext        MEDICATIONS  (STANDING):  heparin  Infusion. Unit(s)/Hr IV Continuous <Continuous>  lisinopril 10milliGRAM(s) Oral daily  carBAMazepine XR Tablet 400milliGRAM(s) Oral two times a day  levETIRAcetam 500milliGRAM(s) Oral two times a day  atorvastatin 80milliGRAM(s) Oral at bedtime  furosemide    Tablet 40milliGRAM(s) Oral daily    MEDICATIONS  (PRN):  heparin  Injectable 96312Jbof(s) IV Push every 6 hours PRN For aPTT less than 40  heparin  Injectable 5000Unit(s) IV Push every 6 hours PRN For aPTT between 40 - 57    Vital Signs Last 24 Hrs  T(C): 36.6, Max: 36.8 (06-10 @ 21:10)  T(F): 97.9, Max: 98.3 (06-10 @ 21:10)  HR: 64 (64 - 77)  BP: 134/80 (132/80 - 135/68)  BP(mean): --  RR: 18 (18 - 18)  SpO2: 97% (97% - 98%)  Constitutional: No fever, fatigue or weight loss.  Skin: No rash.  Eyes: No recent vision problems or eye pain.  ENT: No congestion, ear pain, or sore throat.  Endocrine: No thyroid problems.  Cardiovascular: No chest pain or palpation.  Respiratory: No cough, shortness of breath, congestion, or wheezing.  Gastrointestinal: No abdominal pain, nausea, vomiting, or diarrhea.  Genitourinary: No dysuria.  Musculoskeletal: No joint swelling.  Neurologic: No headache.    PHYSICAL EXAM:  GENERAL: morbidly obese    EYES: EOMI, PERRLA, conjunctiva and sclera clear  NECK: Supple, No JVD  CHEST/LUNG: dec breath sounds at bases  HEART: Regular rate and rhythm;   ABDOMEN: Soft, Nontender, mild distenson+; Bowel sounds present  EXTREMITIES:  2+ Peripheral Pulses, No clubbing, cyanosis, or edema  Neuro : alert, awake calm , coopertive  SKIN: erythematous  rodolfo lower ext , chronic venous stasis changes +, fungal toe nails, dry scaly skin over rt foot          LLABS:      137  |  101  |  14  ----------------------------<  100<H>  4.2   |  22  |  1.02    Ca    8.5      2017 08:27      Creatinine Trend: 1.02 <--, 0.81 <--, 0.97 <--                        11.1   6.67  )-----------( 205      ( 2017 08:26 )             33.7     Urine Studies:  Urinalysis Basic - ( 2017 14:02 )    Color: Yellow / Appearance: Clear / S.030 / pH:   Gluc:  / Ketone: Trace  / Bili: Negative / Urobili: 1   Blood:  / Protein: 100 mg/dL / Nitrite: Negative   Leuk Esterase: Trace / RBC: 3-5 /HPF / WBC 6-10 /HPF   Sq Epi:  / Non Sq Epi: OCC /HPF / Bacteria:                 PT/INR - ( 2017 01:10 )   PT: 12.7 sec;   INR: 1.17 ratio         PTT - ( 2017 01:10 )  PTT:87.8 sec                PTT - ( 10 Trevon 2017 12:23 )  PTT:50.1 sec

## 2017-06-12 LAB
ANION GAP SERPL CALC-SCNC: 16 MMOL/L — SIGNIFICANT CHANGE UP (ref 5–17)
APTT BLD: 89.4 SEC — HIGH (ref 27.5–37.4)
BUN SERPL-MCNC: 16 MG/DL — SIGNIFICANT CHANGE UP (ref 7–23)
CALCIUM SERPL-MCNC: 8.7 MG/DL — SIGNIFICANT CHANGE UP (ref 8.4–10.5)
CEA FLD-MCNC: 296 NG/ML — SIGNIFICANT CHANGE UP
CHLORIDE SERPL-SCNC: 101 MMOL/L — SIGNIFICANT CHANGE UP (ref 96–108)
CO2 SERPL-SCNC: 21 MMOL/L — LOW (ref 22–31)
CREAT SERPL-MCNC: 1.05 MG/DL — SIGNIFICANT CHANGE UP (ref 0.5–1.3)
GLUCOSE SERPL-MCNC: 109 MG/DL — HIGH (ref 70–99)
HCT VFR BLD CALC: 34.4 % — LOW (ref 39–50)
HGB BLD-MCNC: 11 G/DL — LOW (ref 13–17)
INR BLD: 1.06 RATIO — SIGNIFICANT CHANGE UP (ref 0.88–1.16)
MCHC RBC-ENTMCNC: 31 PG — SIGNIFICANT CHANGE UP (ref 27–34)
MCHC RBC-ENTMCNC: 32 GM/DL — SIGNIFICANT CHANGE UP (ref 32–36)
MCV RBC AUTO: 96.9 FL — SIGNIFICANT CHANGE UP (ref 80–100)
PLATELET # BLD AUTO: 224 K/UL — SIGNIFICANT CHANGE UP (ref 150–400)
POTASSIUM SERPL-MCNC: 4.3 MMOL/L — SIGNIFICANT CHANGE UP (ref 3.5–5.3)
POTASSIUM SERPL-SCNC: 4.3 MMOL/L — SIGNIFICANT CHANGE UP (ref 3.5–5.3)
PROTHROM AB SERPL-ACNC: 12 SEC — SIGNIFICANT CHANGE UP (ref 10–13.1)
RBC # BLD: 3.55 M/UL — LOW (ref 4.2–5.8)
RBC # FLD: 13.2 % — SIGNIFICANT CHANGE UP (ref 10.3–14.5)
SODIUM SERPL-SCNC: 138 MMOL/L — SIGNIFICANT CHANGE UP (ref 135–145)
SPECIMEN SOURCE FLD: SIGNIFICANT CHANGE UP
SURGICAL PATHOLOGY STUDY: SIGNIFICANT CHANGE UP
WBC # BLD: 6.55 K/UL — SIGNIFICANT CHANGE UP (ref 3.8–10.5)
WBC # FLD AUTO: 6.55 K/UL — SIGNIFICANT CHANGE UP (ref 3.8–10.5)

## 2017-06-12 PROCEDURE — 99233 SBSQ HOSP IP/OBS HIGH 50: CPT

## 2017-06-12 PROCEDURE — 99232 SBSQ HOSP IP/OBS MODERATE 35: CPT

## 2017-06-12 RX ORDER — WARFARIN SODIUM 2.5 MG/1
10 TABLET ORAL ONCE
Qty: 0 | Refills: 0 | Status: COMPLETED | OUTPATIENT
Start: 2017-06-12 | End: 2017-06-12

## 2017-06-12 RX ADMIN — LISINOPRIL 10 MILLIGRAM(S): 2.5 TABLET ORAL at 05:47

## 2017-06-12 RX ADMIN — ATORVASTATIN CALCIUM 80 MILLIGRAM(S): 80 TABLET, FILM COATED ORAL at 21:35

## 2017-06-12 RX ADMIN — HEPARIN SODIUM 2000 UNIT(S)/HR: 5000 INJECTION INTRAVENOUS; SUBCUTANEOUS at 09:18

## 2017-06-12 RX ADMIN — Medication 200 MILLIGRAM(S): at 18:33

## 2017-06-12 RX ADMIN — Medication 400 MILLIGRAM(S): at 05:47

## 2017-06-12 RX ADMIN — Medication 200 MILLIGRAM(S): at 05:46

## 2017-06-12 RX ADMIN — SODIUM CHLORIDE 3 MILLILITER(S): 9 INJECTION INTRAMUSCULAR; INTRAVENOUS; SUBCUTANEOUS at 13:04

## 2017-06-12 RX ADMIN — LEVETIRACETAM 500 MILLIGRAM(S): 250 TABLET, FILM COATED ORAL at 05:47

## 2017-06-12 RX ADMIN — WARFARIN SODIUM 10 MILLIGRAM(S): 2.5 TABLET ORAL at 21:35

## 2017-06-12 RX ADMIN — Medication 400 MILLIGRAM(S): at 18:33

## 2017-06-12 RX ADMIN — SODIUM CHLORIDE 3 MILLILITER(S): 9 INJECTION INTRAMUSCULAR; INTRAVENOUS; SUBCUTANEOUS at 05:47

## 2017-06-12 RX ADMIN — LEVETIRACETAM 500 MILLIGRAM(S): 250 TABLET, FILM COATED ORAL at 18:33

## 2017-06-12 RX ADMIN — Medication 40 MILLIGRAM(S): at 05:47

## 2017-06-12 RX ADMIN — SODIUM CHLORIDE 3 MILLILITER(S): 9 INJECTION INTRAMUSCULAR; INTRAVENOUS; SUBCUTANEOUS at 21:37

## 2017-06-12 NOTE — PROGRESS NOTE ADULT - SUBJECTIVE AND OBJECTIVE BOX
Vascular Medicine Inpatient Progress Note    Asked by Dr. Hamm to evaluate patient with acute DVT.    s/p EUS with biopsy--tolerated well.  Remains hemodynamically stable.  Tolerating anticoagulation.    Eventual start oral anticoagulant when GI/GI onc sx evaluation completed.        EXAM:  CT ANGIO CHEST (W)AW IC                            PROCEDURE DATE:  06/07/2017        INTERPRETATION:  CLINICAL INFORMATION: Palpitations and leg pain. History   of PE recently taken off of anticoagulation.    COMPARISON: CT chest February 5, 2010     PROCEDURE:   CTA of the Chest was performed withintravenous contrast.  90 ml of Omnipaque 350 was injected intravenously. 10 ml were discarded.  Sagittal and coronal reformats were performed as well as 3D   Reconstructions.    FINDINGS:    CHEST:     LUNGS AND LARGE AIRWAYS: Patent central airways. Right upper lobe nodule   (8:58) measures 7 mm, unchanged from the prior study.  PLEURA: No pleural effusion.  VESSELS: There is no central, lobar, or segmental pulmonary embolus.   Evaluation of the subsegmental branches is limited secondary to body   habitus and motion artifact. Atherosclerotic calcifications of the aorta.  HEART: Heart size is normal.No pericardial effusion.  MEDIASTINUM AND WALTER: No lymphadenopathy.  CHEST WALL AND LOWER NECK: Within normal limits.  VISUALIZED UPPER ABDOMEN:Fatty atrophy of the pancreas. Indeterminant an   ill-defined soft tissue density pancreatic body lesion measuring 2.5 x   2.9 x 3.3 cm with interval increase in size since February 5, 2010   measured about 2 x 1.5 cm.  BONES: Within normal limits.    IMPRESSION:   1.  No central, lobar, or segmental pulmonary embolus. Evaluation of the   subsegmental branches is limited secondary to body habitus and motion   artifact.  2.  Soft tissue density pancreatic body lesion measuring 2.5 x 2.9 x 3.3   cmwith interval increase in size since February 5, 2010 worrisome for   neoplastic etiology. GI consultation is recommended.      ***Please see the addendum at the top of this report. It may contain   additional important information or changes.****      LINDA MUNOZ M.D., RADIOLOGY RESIDENT  This document has been electronically signed.  ANUSHKA WINTERS M.D. ATTENDING RADIOLOGIST  This document has been electronically signed. Jun 7 2017  6:58PM  Addend:  ANUSHKA WINTERS M.D. ATTENDING RADIOLOGIST  This addendum was electronically signed on: Jun 7 2017  8:14PM.            EXAM:  DUPLEX SCAN EXT VEINS LOWER BI                            PROCEDURE DATE:  06/07/2017        INTERPRETATION:  History:On a prior examination dated 3/2/2011, a short   segment of nonocclusive thrombus was identified in the right common   femoral vein, IVC filter placed, recently anticoagulation medicines had   been halted, left lower extremity swelling, rule out DVT.    There is edema within the superficial soft tissues of the left lower   extremity.    On the right, there remains intraluminal filling defects similar in   appearance to those 6 years earlier, representing the residue of prior   DVT.    There is acute DVT, almost occlusive to flow, affecting the left femoral   and common femoral veins.    The left greater saphenous vein, a superficial vein, is also thrombosed.    The calf veins were not diagnostically imaged.    IMPRESSION: There is acute almost occlusive DVT affecting the left   femoral and common femoral veins.  Superficial thrombophlebitis affects the left greater saphenous vein.      MEDICATIONS  (STANDING):  lisinopril 10milliGRAM(s) Oral daily  carBAMazepine XR Tablet 400milliGRAM(s) Oral two times a day  levETIRAcetam 500milliGRAM(s) Oral two times a day  atorvastatin 80milliGRAM(s) Oral at bedtime  furosemide    Tablet 40milliGRAM(s) Oral daily  ciprofloxacin   IVPB 400milliGRAM(s) IV Intermittent every 12 hours  heparin  Infusion. 1700Unit(s)/Hr IV Continuous <Continuous>  sodium chloride 0.9% lock flush 3milliLiter(s) IV Push every 8 hours    MEDICATIONS  (PRN):  heparin  Injectable 72515Zokd(s) IV Push every 6 hours PRN For aPTT less than 40  heparin  Injectable 5000Unit(s) IV Push every 6 hours PRN For aPTT between 40 - 57      PHYSICAL EXAM:  Vital Signs Last 24 Hrs  T(C): 36.4, Max: 36.6 (06-11 @ 12:01)  T(F): 97.6, Max: 97.9 (06-11 @ 12:01)  HR: 62 (60 - 64)  BP: 125/76 (112/77 - 134/80)  BP(mean): 89 (89 - 89)  RR: 18 (18 - 18)  SpO2: 95% (93% - 97%)    Appearance: with morbid obesity, appears chronically ill  HEENT:   Normal oral mucosa, PERRL, EOMI	  Lymphatic: No obvious lymphadenopathy  Cardiovascular: Normal S1 S2, No JVD, No murmurs,  Respiratory: Decreased BS anteriorly, poor inspiratory effort	  Psychiatry: A & O x 3, Mood & affect appropriate  Gastrointestinal:  Obese, otherwise soft, Non-tender, + BS	  Skin: +chronic venous stasis changes LLE>RLE, skin discoloration	  Neurologic: +B/L weakness  Extremities: Edema as noted above.      LABS:	 	                          11.1   6.67  )-----------( 205      ( 11 Jun 2017 08:26 )             33.7     06-11    137  |  101  |  14  ----------------------------<  100<H>  4.2   |  22  |  1.02    Ca    8.5      11 Jun 2017 08:27          Patient name: CYNTHIA TAPIA  YOB: 1950   Age: 67 (M)   MR#: [id number]  Study Date: 6/8/2017  Location: [address] [place], [place] [id number]: Alyson Emerson RDCS  Study quality: Technically difficult  Referring Physician: Bipin Hamm MD  Blood Pressure: 114/72 mmHg  Height: 180 cm  Weight: 192 kg  BSA: 2.9 m2  ------------------------------------------------------------------------  PROCEDURE: Transthoracic echocardiogram with 2-D, M-Mode  and complete spectral and color flow Doppler. Verbal  consent was obtained for injection of echo contrast  following a discussion of risks and benefits. Following  intravenous injection of contrast, harmonic imaging was  performed.  INDICATION: Chest pain, unspecified (R07.9)  ------------------------------------------------------------------------  Dimensions:    Normal Values:  LA:            2.0 - 4.0 cm  Ao:     3.4    2.0 - 3.8 cm  SEPTUM: 1.1    0.6 - 1.2 cm  PWT:    1.2    0.6 - 1.1 cm  LVIDd:  5.5    3.0 - 5.6 cm  LVIDs:  3.2    1.8 - 4.0 cm  Derived variables:  LVMI: 89 g/m2  RWT: 0.43  Fractional short: 42 %  EF (Teicholtz): 72 %  Doppler Peak Velocity (m/sec): AoV=2.0  ------------------------------------------------------------------------  Observations:  Mitral Valve: Mitral annular calcification, otherwise  normal mitral valve. Minimal mitral regurgitation.  Aortic Valve/Aorta: Aortic valve not well visualized;  appears calcified. No aortic valve regurgitation seen. Peak  left ventricular outflow tract gradient equals 3 mm Hg,  mean gradient is equal to 1 mm Hg, LVOT velocity time  integral equals 16 cm.  Aortic Root: 3.4 cm.  LVOT diameter: 2.2 cm.  Left Atrium: Normal left atrium.  Left Ventricle: Endocardial visualization enhanced with  intravenous injection of echo contrast (Definity).  Endocardium not well visualized despite the use of echo  contrast. Grossly normal LV function. Suboptimal images  precludes accurage evaluation for regional wall motion  abnormalities. Normal left ventricular internal dimensions  and wall thicknesses.  Right Heart: Normal right atrium. The right ventricle is  not well visualized; grossly normal right ventricular  systolic function. Normal tricuspid valve. Minimal  tricuspid regurgitation. Pulmonic valve not well  visualized.  Pericardium/Pleura: Normal pericardium with no pericardial  effusion.  Hemodynamic: Estimated right atrial pressure is 8 mm Hg.  Estimated right ventricular systolic pressure equals 28 mm  Hg, assuming right atrial pressure equals 8 mm Hg,  consistent with normal pulmonary pressures.  ------------------------------------------------------------------------  Conclusions:  1. Normal left ventricular internal dimensions and wall  thicknesses.  2. Endocardial visualization enhanced with intravenous  injection of echo contrast (Definity). Endocardium not well  visualized despite the use of echo contrast. Grossly normal  LV function. Suboptimal images precludes accurage  evaluation for regional wall motion abnormalities.  3. The right ventricle is notwell visualized; grossly  normal right ventricular systolic function.  *** No previous Echo exam.  ------------------------------------------------------------------------  Confirmed on  6/8/2017 - 13:05:14 by Max Soliz M.D.  ------------------------------------------------------------------------      Provoked VTE -- on anticoagulation with heparin.  Hemodynamically stable, no suggestion of respiratory distress.    --Agree with heparin gtt.  Eventual transition to oral anticoagulant when able (GI/Gi sx evaluations ongoing--> probably poor surgical candidate)  --Do not recommend IVC filter as patient is likely hypercoagulable with underlying neoplasm.  --Wound care evaluation.  --Will follow with you--thank you. Vascular Medicine Inpatient Progress Note    Asked by Dr. Hamm to evaluate patient with acute DVT.    s/p EUS with biopsy--tolerated well.  Remains hemodynamically stable.  Tolerating anticoagulation.  Started on warfarin--received 5 mg last night.  INR goal 2-3.        EXAM:  CT ANGIO CHEST (W)AW IC                            PROCEDURE DATE:  06/07/2017        INTERPRETATION:  CLINICAL INFORMATION: Palpitations and leg pain. History   of PE recently taken off of anticoagulation.    COMPARISON: CT chest February 5, 2010     PROCEDURE:   CTA of the Chest was performed withintravenous contrast.  90 ml of Omnipaque 350 was injected intravenously. 10 ml were discarded.  Sagittal and coronal reformats were performed as well as 3D   Reconstructions.    FINDINGS:    CHEST:     LUNGS AND LARGE AIRWAYS: Patent central airways. Right upper lobe nodule   (8:58) measures 7 mm, unchanged from the prior study.  PLEURA: No pleural effusion.  VESSELS: There is no central, lobar, or segmental pulmonary embolus.   Evaluation of the subsegmental branches is limited secondary to body   habitus and motion artifact. Atherosclerotic calcifications of the aorta.  HEART: Heart size is normal.No pericardial effusion.  MEDIASTINUM AND WALTER: No lymphadenopathy.  CHEST WALL AND LOWER NECK: Within normal limits.  VISUALIZED UPPER ABDOMEN:Fatty atrophy of the pancreas. Indeterminant an   ill-defined soft tissue density pancreatic body lesion measuring 2.5 x   2.9 x 3.3 cm with interval increase in size since February 5, 2010   measured about 2 x 1.5 cm.  BONES: Within normal limits.    IMPRESSION:   1.  No central, lobar, or segmental pulmonary embolus. Evaluation of the   subsegmental branches is limited secondary to body habitus and motion   artifact.  2.  Soft tissue density pancreatic body lesion measuring 2.5 x 2.9 x 3.3   cmwith interval increase in size since February 5, 2010 worrisome for   neoplastic etiology. GI consultation is recommended.      ***Please see the addendum at the top of this report. It may contain   additional important information or changes.****      LINDA MUNOZ M.D., RADIOLOGY RESIDENT  This document has been electronically signed.  ANUSHKA WINTERS M.D. ATTENDING RADIOLOGIST  This document has been electronically signed. Jun 7 2017  6:58PM  Addend:  ANUSHKA WINTERS M.D. ATTENDING RADIOLOGIST  This addendum was electronically signed on: Jun 7 2017  8:14PM.            EXAM:  DUPLEX SCAN EXT VEINS LOWER BI                            PROCEDURE DATE:  06/07/2017        INTERPRETATION:  History:On a prior examination dated 3/2/2011, a short   segment of nonocclusive thrombus was identified in the right common   femoral vein, IVC filter placed, recently anticoagulation medicines had   been halted, left lower extremity swelling, rule out DVT.    There is edema within the superficial soft tissues of the left lower   extremity.    On the right, there remains intraluminal filling defects similar in   appearance to those 6 years earlier, representing the residue of prior   DVT.    There is acute DVT, almost occlusive to flow, affecting the left femoral   and common femoral veins.    The left greater saphenous vein, a superficial vein, is also thrombosed.    The calf veins were not diagnostically imaged.    IMPRESSION: There is acute almost occlusive DVT affecting the left   femoral and common femoral veins.  Superficial thrombophlebitis affects the left greater saphenous vein.      MEDICATIONS  (STANDING):  lisinopril 10milliGRAM(s) Oral daily  carBAMazepine XR Tablet 400milliGRAM(s) Oral two times a day  levETIRAcetam 500milliGRAM(s) Oral two times a day  atorvastatin 80milliGRAM(s) Oral at bedtime  furosemide    Tablet 40milliGRAM(s) Oral daily  ciprofloxacin   IVPB 400milliGRAM(s) IV Intermittent every 12 hours  heparin  Infusion. 1700Unit(s)/Hr IV Continuous <Continuous>  sodium chloride 0.9% lock flush 3milliLiter(s) IV Push every 8 hours    MEDICATIONS  (PRN):  heparin  Injectable 16774Kplo(s) IV Push every 6 hours PRN For aPTT less than 40  heparin  Injectable 5000Unit(s) IV Push every 6 hours PRN For aPTT between 40 - 57      PHYSICAL EXAM:  Vital Signs Last 24 Hrs  T(C): 36.4, Max: 36.6 (06-11 @ 12:01)  T(F): 97.6, Max: 97.9 (06-11 @ 12:01)  HR: 62 (60 - 64)  BP: 125/76 (112/77 - 134/80)  BP(mean): 89 (89 - 89)  RR: 18 (18 - 18)  SpO2: 95% (93% - 97%)    Appearance: with morbid obesity, appears chronically ill  HEENT:   Normal oral mucosa, PERRL, EOMI	  Lymphatic: No obvious lymphadenopathy  Cardiovascular: Normal S1 S2, No JVD, No murmurs,  Respiratory: Decreased BS anteriorly, poor inspiratory effort	  Psychiatry: A & O x 3, Mood & affect appropriate  Gastrointestinal:  Obese, otherwise soft, Non-tender, + BS	  Skin: +chronic venous stasis changes LLE>RLE, skin discoloration	  Neurologic: +B/L weakness  Extremities: Edema as noted above.      LABS:	 	                          11.1   6.67  )-----------( 205      ( 11 Jun 2017 08:26 )             33.7     06-11    137  |  101  |  14  ----------------------------<  100<H>  4.2   |  22  |  1.02    Ca    8.5      11 Jun 2017 08:27          Patient name: CYNTHIA TAPIA  YOB: 1950   Age: 67 (M)   MR#: [id number]  Study Date: 6/8/2017  Location: [address] [place], [place] [id number]: Alyson Emerson RDCS  Study quality: Technically difficult  Referring Physician: Bipin Hamm MD  Blood Pressure: 114/72 mmHg  Height: 180 cm  Weight: 192 kg  BSA: 2.9 m2  ------------------------------------------------------------------------  PROCEDURE: Transthoracic echocardiogram with 2-D, M-Mode  and complete spectral and color flow Doppler. Verbal  consent was obtained for injection of echo contrast  following a discussion of risks and benefits. Following  intravenous injection of contrast, harmonic imaging was  performed.  INDICATION: Chest pain, unspecified (R07.9)  ------------------------------------------------------------------------  Dimensions:    Normal Values:  LA:            2.0 - 4.0 cm  Ao:     3.4    2.0 - 3.8 cm  SEPTUM: 1.1    0.6 - 1.2 cm  PWT:    1.2    0.6 - 1.1 cm  LVIDd:  5.5    3.0 - 5.6 cm  LVIDs:  3.2    1.8 - 4.0 cm  Derived variables:  LVMI: 89 g/m2  RWT: 0.43  Fractional short: 42 %  EF (Teicholtz): 72 %  Doppler Peak Velocity (m/sec): AoV=2.0  ------------------------------------------------------------------------  Observations:  Mitral Valve: Mitral annular calcification, otherwise  normal mitral valve. Minimal mitral regurgitation.  Aortic Valve/Aorta: Aortic valve not well visualized;  appears calcified. No aortic valve regurgitation seen. Peak  left ventricular outflow tract gradient equals 3 mm Hg,  mean gradient is equal to 1 mm Hg, LVOT velocity time  integral equals 16 cm.  Aortic Root: 3.4 cm.  LVOT diameter: 2.2 cm.  Left Atrium: Normal left atrium.  Left Ventricle: Endocardial visualization enhanced with  intravenous injection of echo contrast (Definity).  Endocardium not well visualized despite the use of echo  contrast. Grossly normal LV function. Suboptimal images  precludes accurage evaluation for regional wall motion  abnormalities. Normal left ventricular internal dimensions  and wall thicknesses.  Right Heart: Normal right atrium. The right ventricle is  not well visualized; grossly normal right ventricular  systolic function. Normal tricuspid valve. Minimal  tricuspid regurgitation. Pulmonic valve not well  visualized.  Pericardium/Pleura: Normal pericardium with no pericardial  effusion.  Hemodynamic: Estimated right atrial pressure is 8 mm Hg.  Estimated right ventricular systolic pressure equals 28 mm  Hg, assuming right atrial pressure equals 8 mm Hg,  consistent with normal pulmonary pressures.  ------------------------------------------------------------------------  Conclusions:  1. Normal left ventricular internal dimensions and wall  thicknesses.  2. Endocardial visualization enhanced with intravenous  injection of echo contrast (Definity). Endocardium not well  visualized despite the use of echo contrast. Grossly normal  LV function. Suboptimal images precludes accurage  evaluation for regional wall motion abnormalities.  3. The right ventricle is notwell visualized; grossly  normal right ventricular systolic function.  *** No previous Echo exam.  ------------------------------------------------------------------------  Confirmed on  6/8/2017 - 13:05:14 by Max Soliz M.D.  ------------------------------------------------------------------------      Provoked VTE -- on anticoagulation with heparin.  Hemodynamically stable, no suggestion of respiratory distress.    --Agree with heparin gtt-->warfarin.  INR goal 2-3.  Duration of therapy may need to be extended/indefinite as long as benefits outweigh risks.  --Do not recommend IVC filter as patient is likely hypercoagulable with underlying neoplasm.  --Wound care evaluation.  --Will follow with you--thank you.

## 2017-06-12 NOTE — PROGRESS NOTE ADULT - SUBJECTIVE AND OBJECTIVE BOX
[facility] -- Yan Loaiza Grossman, [place], Pannella, Patel      Follow Up:  DVT    Subjective/Observations: Patient seen and examined. Events noted. Resting comfortably in bed. No complaints of chest pain, dyspnea, or palpitations reported.       REVIEW OF SYSTEMS: All other review of systems is negative unless indicated above    PAST MEDICAL & SURGICAL HISTORY:  Pulmonary embolism  Obesity  Seizure disorder  Hemiplegia Affecting Dominant Side  Aphasia: Expressive  Hypercholesteremia  CVA (Cerebral Vascular Accident)  Pulmonary embolism: wyatt filter  S/P cataract extraction and insertion of intraocular lens, right: 9/16/2010  No Past Surgical History      MEDICATIONS  (STANDING):  lisinopril 10milliGRAM(s) Oral daily  carBAMazepine XR Tablet 400milliGRAM(s) Oral two times a day  levETIRAcetam 500milliGRAM(s) Oral two times a day  atorvastatin 80milliGRAM(s) Oral at bedtime  furosemide    Tablet 40milliGRAM(s) Oral daily  ciprofloxacin   IVPB 400milliGRAM(s) IV Intermittent every 12 hours  heparin  Infusion. 1700Unit(s)/Hr IV Continuous <Continuous>  sodium chloride 0.9% lock flush 3milliLiter(s) IV Push every 8 hours    MEDICATIONS  (PRN):  heparin  Injectable 32833Xvca(s) IV Push every 6 hours PRN For aPTT less than 40  heparin  Injectable 5000Unit(s) IV Push every 6 hours PRN For aPTT between 40 - 57      Allergies    No Known Allergies    Intolerances            Vital Signs Last 24 Hrs  T(C): 36.4, Max: 36.6 (06-11 @ 12:01)  T(F): 97.6, Max: 97.9 (06-11 @ 12:01)  HR: 62 (60 - 64)  BP: 125/76 (112/77 - 134/80)  BP(mean): 89 (89 - 89)  RR: 18 (18 - 18)  SpO2: 95% (93% - 97%)    I&O's Summary    I & Os for current day (as of 12 Jun 2017 10:10)  =============================================  IN: 1820 ml / OUT: 2200 ml / NET: -380 ml        PHYSICAL EXAM:  TELE: SB 60  Constitutional: NAD, awake and alert, well-developed  HEENT: Moist Mucous Membranes, Anicteric  Pulmonary: Non-labored, breath sounds are clear bilaterally but decreased ,    Cardiovascular: Regular, S1 and S2, distant  Gastrointestinal: Bowel Sounds present, soft, nontender.   Lymph: 2 + peripheral edema. No lymphadenopathy.  Skin: No visible rashes or ulcers.  Psych:  Mood & affect appropriate    LABS: All Labs Reviewed:                        11.0   6.55  )-----------( 224      ( 12 Jun 2017 08:36 )             34.4                         11.1   6.67  )-----------( 205      ( 11 Jun 2017 08:26 )             33.7                         11.3   6.63  )-----------( 199      ( 10 Trevon 2017 11:18 )             34.6     12 Jun 2017 08:36    138    |  101    |  16     ----------------------------<  109    4.3     |  21     |  1.05   11 Jun 2017 08:27    137    |  101    |  14     ----------------------------<  100    4.2     |  22     |  1.02     Ca    8.7        12 Jun 2017 08:36  Ca    8.5        11 Jun 2017 08:27      PT/INR - ( 12 Jun 2017 08:36 )   PT: 12.0 sec;   INR: 1.06 ratio         PTT - ( 12 Jun 2017 08:36 )  PTT:89.4 sec

## 2017-06-12 NOTE — PROGRESS NOTE ADULT - ASSESSMENT
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  await cea and cytology.  Patient understands increased growth typically makes sugery option, however, as disussed with Dr. Avelar, based on patients comorbidites and body habitus, in his case conservative watching is appropriate.

## 2017-06-12 NOTE — PROGRESS NOTE ADULT - ASSESSMENT
67M w/ non-obstructing pancreatic body mass s/p EUS w/ biopsy   - F/u final path.   - Despite the fact that the lesion is most likely a mucinous cystadenoma, the patient is a very poor surgical candidate at this point in the setting of his morbid obesity and multiple comorbidities. 67M w/ non-obstructing pancreatic body mass suspicious for mucinous cystadenoma s/p EUS w/ biopsy   - F/u final path.   - The patient is a very poor surgical candidate at this point in the setting of his morbid obesity and multiple comorbidities - he is extremely high risk for surgery.   - No indication for an acute surgical intervention during this hospitalization. Would advocate for observation and outpatient follow up. Pt can follow up with Dr Ontiveros in the office as an outpatient, advised on the inevitable need for improved body habitus prior to offering any surgery.   - Discussed with Dr Ontiveros.

## 2017-06-12 NOTE — PROGRESS NOTE ADULT - SUBJECTIVE AND OBJECTIVE BOX
Follow-up Pulm Progress Note    No new respiratory events overnight.  Denies SOB/CP.     Medications:  MEDICATIONS  (STANDING):  lisinopril 10milliGRAM(s) Oral daily  carBAMazepine XR Tablet 400milliGRAM(s) Oral two times a day  levETIRAcetam 500milliGRAM(s) Oral two times a day  atorvastatin 80milliGRAM(s) Oral at bedtime  furosemide    Tablet 40milliGRAM(s) Oral daily  ciprofloxacin   IVPB 400milliGRAM(s) IV Intermittent every 12 hours  heparin  Infusion. 1700Unit(s)/Hr IV Continuous <Continuous>  sodium chloride 0.9% lock flush 3milliLiter(s) IV Push every 8 hours    MEDICATIONS  (PRN):  heparin  Injectable 25473Fiwe(s) IV Push every 6 hours PRN For aPTT less than 40  heparin  Injectable 5000Unit(s) IV Push every 6 hours PRN For aPTT between 40 - 57      Vent settings (if applicable)      Vital Signs Last 24 Hrs  T(C): 36.4, Max: 36.6 (06-11 @ 12:01)  T(F): 97.6, Max: 97.9 (06-11 @ 12:01)  HR: 62 (60 - 64)  BP: 125/76 (112/77 - 134/80)  BP(mean): 89 (89 - 89)  RR: 18 (18 - 18)  SpO2: 95% (93% - 97%) on RA      LABS:                        11.0   6.55  )-----------( 224      ( 12 Jun 2017 08:36 )             34.4     06-12    138  |  101  |  16  ----------------------------<  109<H>  4.3   |  21<L>  |  1.05    Ca    8.7      12 Jun 2017 08:36      CAPILLARY BLOOD GLUCOSE    PT/INR - ( 12 Jun 2017 08:36 )   PT: 12.0 sec;   INR: 1.06 ratio         PTT - ( 12 Jun 2017 08:36 )  PTT:89.4 sec      CULTURES:      Physical Examination:  PULM: Decreased BS at bases  CVS: Regular rate and rhythm, no murmurs, rubs, or gallops

## 2017-06-12 NOTE — PROGRESS NOTE ADULT - ASSESSMENT
67 y.o. male history of a CVA with left hemiparesis, DVT's in the past with PE's, morbid obesity, sz d/o, expressive aphasia who presents with acute DVT having had warfarin dc 2w ago, and with pancreatic mass susp for neoplasm, s/p EUS with biopsy, results suspicious for pancreatic mucinous tumor:  -tolerated procedure well with no cardiac complications  -no acute ischemia  -no significant arrhythmias noted, can d/c telemetry monitor  -edema with venous stasis, can cont po lasix  --heparin gtt to be continued with goal PTT 60-90.  Dose Coumadin. Goal INR 2-3.   -echo unrevealing  -monitor and replete electrolytes  -supp oxygen as needed  - All other workup per primary team. Will followup.

## 2017-06-12 NOTE — PROGRESS NOTE ADULT - SUBJECTIVE AND OBJECTIVE BOX
No acute events overnight. Reports feeling ok after endoscopy (EUS). No nausea, no vomiting.   No fevers, no chills    Vital Signs Last 24 Hrs  T(C): 36.4, Max: 36.6 (06-11 @ 12:01)  HR: 62 (60 - 64)  BP: 125/76 (112/77 - 134/80)  RR: 18 (18 - 18)  SpO2: 95% (93% - 97%)      Physical Exam:   General: alert and oriented, NAD - morbidly obese  Resp: airway patent, respirations unlabored  CVS: regular rate and rhythm  Abdomen: Obese, soft, nontender, nondistended. No palpable organomegaly or masses. No rebound, no guarding.   Extremities: B/L lower extremities with severe venous stasis changes and hemosiderin deposition.  + LLE weakness (motor 1/5)     Labs (12 Jun 2017):                        11.0   6.55  )-----------( 224                   34.4       138  |  101  |  16  ----------------------------<  109  4.3   |  21 |  1.05

## 2017-06-12 NOTE — PROGRESS NOTE ADULT - SUBJECTIVE AND OBJECTIVE BOX
CYNTHIA TAPIA:[id number],   67yMale followed for:  No Known Allergies    PAST MEDICAL & SURGICAL HISTORY:  Pulmonary embolism  Obesity  Seizure disorder  Hemiplegia Affecting Dominant Side  Aphasia: Expressive  Hypercholesteremia  CVA (Cerebral Vascular Accident)  Pulmonary embolism: [place] filter  S/P cataract extraction and insertion of intraocular lens, right: 9/16/2010  No Past Surgical History    FAMILY HISTORY:  No pertinent family history in first degree relatives    MEDICATIONS  (STANDING):  lisinopril 10milliGRAM(s) Oral daily  carBAMazepine XR Tablet 400milliGRAM(s) Oral two times a day  levETIRAcetam 500milliGRAM(s) Oral two times a day  atorvastatin 80milliGRAM(s) Oral at bedtime  furosemide    Tablet 40milliGRAM(s) Oral daily  ciprofloxacin   IVPB 400milliGRAM(s) IV Intermittent every 12 hours  heparin  Infusion. 1700Unit(s)/Hr IV Continuous <Continuous>  sodium chloride 0.9% lock flush 3milliLiter(s) IV Push every 8 hours    MEDICATIONS  (PRN):  heparin  Injectable 41436Hvfj(s) IV Push every 6 hours PRN For aPTT less than 40  heparin  Injectable 5000Unit(s) IV Push every 6 hours PRN For aPTT between 40 - 57      Vital Signs Last 24 Hrs  T(C): 36.4, Max: 36.6 (06-11 @ 12:01)  T(F): 97.6, Max: 97.9 (06-11 @ 12:01)  HR: 62 (60 - 64)  BP: 125/76 (112/77 - 134/80)  BP(mean): 89 (89 - 89)  RR: 18 (18 - 18)  SpO2: 95% (93% - 97%)  nc/at  s1s2  cta  soft, nt, nd no guarding or rebound, signficiant obesity  no c/c/e le dermopathy    CBC Full  -  ( 11 Jun 2017 08:26 )  WBC Count : 6.67 K/uL  Hemoglobin : 11.1 g/dL  Hematocrit : 33.7 %  Platelet Count - Automated : 205 K/uL  Mean Cell Volume : 96.8 fl  Mean Cell Hemoglobin : 31.9 pg  Mean Cell Hemoglobin Concentration : 32.9 gm/dL  Auto Neutrophil # : x  Auto Lymphocyte # : x  Auto Monocyte # : x  Auto Eosinophil # : x  Auto Basophil # : x  Auto Neutrophil % : x  Auto Lymphocyte % : x  Auto Monocyte % : x  Auto Eosinophil % : x  Auto Basophil % : x    06-11    137  |  101  |  14  ----------------------------<  100<H>  4.2   |  22  |  1.02    Ca    8.5      11 Jun 2017 08:27      PT/INR - ( 11 Jun 2017 01:10 )   PT: 12.7 sec;   INR: 1.17 ratio         PTT - ( 11 Jun 2017 01:10 )  PTT:87.8 sec

## 2017-06-12 NOTE — PROGRESS NOTE ADULT - ASSESSMENT
68 y/o M with history of provoked PE-DVT 2nd tobacco use and immobility s/p [place] filter (90s), CVA with residual R hemiplegia (2001), seizure history, primarily bedbound now. Admitted with increased LE pain after recently stopping coumadin. Found to have acute on chronic DVT. No evidence of PE. Incidentally noted pancreatic lesion being recommended for EUS. Called for pre-procedural assessment. Tolerated EUS w/o incident.

## 2017-06-12 NOTE — PROGRESS NOTE ADULT - SUBJECTIVE AND OBJECTIVE BOX
cc : worsening lower ext pain     SUBJECTIVE / OVERNIGHT EVENTS: denies chest pain, shortness of breath, nausea,v , weakness in lower ext        MEDICATIONS  (STANDING):  heparin  Infusion. Unit(s)/Hr IV Continuous <Continuous>  lisinopril 10milliGRAM(s) Oral daily  carBAMazepine XR Tablet 400milliGRAM(s) Oral two times a day  levETIRAcetam 500milliGRAM(s) Oral two times a day  atorvastatin 80milliGRAM(s) Oral at bedtime  furosemide    Tablet 40milliGRAM(s) Oral daily    MEDICATIONS  (PRN):  heparin  Injectable 39318Iomu(s) IV Push every 6 hours PRN For aPTT less than 40  heparin  Injectable 5000Unit(s) IV Push every 6 hours PRN For aPTT between 40 - 57    Vital Signs Last 24 Hrs  T(C): 36.6, Max: 36.6 ( @ 12:17)  T(F): 97.8, Max: 97.8 ( @ 12:17)  HR: 95 (60 - 95)  BP: 121/68 (112/77 - 125/76)  BP(mean): 89 (89 - 89)  RR: 18 (18 - 18)  SpO2: 95% (93% - 95%)    Constitutional: No fever, fatigue or weight loss.  Skin: No rash.  Eyes: No recent vision problems or eye pain.  ENT: No congestion, ear pain, or sore throat.  Endocrine: No thyroid problems.  Cardiovascular: No chest pain or palpation.  Respiratory: No cough, shortness of breath, congestion, or wheezing.  Gastrointestinal: No abdominal pain, nausea, vomiting, or diarrhea.  Genitourinary: No dysuria.  Musculoskeletal: No joint swelling.  Neurologic: No headache.    PHYSICAL EXAM:  GENERAL: morbidly obese    EYES: EOMI, PERRLA, conjunctiva and sclera clear  NECK: Supple, No JVD  CHEST/LUNG: dec breath sounds at bases  HEART: Regular rate and rhythm;   ABDOMEN: Soft, Nontender, mild distenson+; Bowel sounds present  EXTREMITIES:  2+ Peripheral Pulses, No clubbing, cyanosis, or edema  Neuro : alert, awake calm , coopertive  SKIN: erythematous  rodolfo lower ext , chronic venous stasis changes +, fungal toe nails, dry scaly skin over rt foot      LABS:      138  |  101  |  16  ----------------------------<  109<H>  4.3   |  21<L>  |  1.05    Ca    8.7      2017 08:36      Creatinine Trend: 1.05 <--, 1.02 <--, 0.81 <--, 0.97 <--                        11.0   6.55  )-----------( 224      ( 2017 08:36 )             34.4     Urine Studies:  Urinalysis Basic - ( 2017 14:02 )    Color: Yellow / Appearance: Clear / S.030 / pH:   Gluc:  / Ketone: Trace  / Bili: Negative / Urobili: 1   Blood:  / Protein: 100 mg/dL / Nitrite: Negative   Leuk Esterase: Trace / RBC: 3-5 /HPF / WBC 6-10 /HPF   Sq Epi:  / Non Sq Epi: OCC /HPF / Bacteria:                 PT/INR - ( 2017 08:36 )   PT: 12.0 sec;   INR: 1.06 ratio         PTT - ( 2017 08:36 )  PTT:89.4 sec

## 2017-06-13 ENCOUNTER — TRANSCRIPTION ENCOUNTER (OUTPATIENT)
Age: 67
End: 2017-06-13

## 2017-06-13 DIAGNOSIS — I47.2 VENTRICULAR TACHYCARDIA: ICD-10-CM

## 2017-06-13 LAB
ANION GAP SERPL CALC-SCNC: 10 MMOL/L — SIGNIFICANT CHANGE UP (ref 5–17)
ANION GAP SERPL CALC-SCNC: 16 MMOL/L — SIGNIFICANT CHANGE UP (ref 5–17)
APTT BLD: 88.9 SEC — HIGH (ref 27.5–37.4)
BUN SERPL-MCNC: 14 MG/DL — SIGNIFICANT CHANGE UP (ref 7–23)
BUN SERPL-MCNC: 16 MG/DL — SIGNIFICANT CHANGE UP (ref 7–23)
CALCIUM SERPL-MCNC: 8.6 MG/DL — SIGNIFICANT CHANGE UP (ref 8.4–10.5)
CALCIUM SERPL-MCNC: 8.9 MG/DL — SIGNIFICANT CHANGE UP (ref 8.4–10.5)
CHLORIDE SERPL-SCNC: 100 MMOL/L — SIGNIFICANT CHANGE UP (ref 96–108)
CHLORIDE SERPL-SCNC: 98 MMOL/L — SIGNIFICANT CHANGE UP (ref 96–108)
CO2 SERPL-SCNC: 22 MMOL/L — SIGNIFICANT CHANGE UP (ref 22–31)
CO2 SERPL-SCNC: 27 MMOL/L — SIGNIFICANT CHANGE UP (ref 22–31)
CREAT SERPL-MCNC: 1.05 MG/DL — SIGNIFICANT CHANGE UP (ref 0.5–1.3)
CREAT SERPL-MCNC: 1.12 MG/DL — SIGNIFICANT CHANGE UP (ref 0.5–1.3)
GLUCOSE SERPL-MCNC: 132 MG/DL — HIGH (ref 70–99)
GLUCOSE SERPL-MCNC: 150 MG/DL — HIGH (ref 70–99)
HCT VFR BLD CALC: 35 % — LOW (ref 39–50)
HGB BLD-MCNC: 11.2 G/DL — LOW (ref 13–17)
INR BLD: 1.13 RATIO — SIGNIFICANT CHANGE UP (ref 0.88–1.16)
MAGNESIUM SERPL-MCNC: 2.1 MG/DL — SIGNIFICANT CHANGE UP (ref 1.6–2.6)
MCHC RBC-ENTMCNC: 31.2 PG — SIGNIFICANT CHANGE UP (ref 27–34)
MCHC RBC-ENTMCNC: 32 GM/DL — SIGNIFICANT CHANGE UP (ref 32–36)
MCV RBC AUTO: 97.5 FL — SIGNIFICANT CHANGE UP (ref 80–100)
NON-GYNECOLOGICAL CYTOLOGY STUDY: SIGNIFICANT CHANGE UP
PLATELET # BLD AUTO: 226 K/UL — SIGNIFICANT CHANGE UP (ref 150–400)
POTASSIUM SERPL-MCNC: 4.5 MMOL/L — SIGNIFICANT CHANGE UP (ref 3.5–5.3)
POTASSIUM SERPL-MCNC: 4.6 MMOL/L — SIGNIFICANT CHANGE UP (ref 3.5–5.3)
POTASSIUM SERPL-SCNC: 4.5 MMOL/L — SIGNIFICANT CHANGE UP (ref 3.5–5.3)
POTASSIUM SERPL-SCNC: 4.6 MMOL/L — SIGNIFICANT CHANGE UP (ref 3.5–5.3)
PROTHROM AB SERPL-ACNC: 12.8 SEC — SIGNIFICANT CHANGE UP (ref 10–13.1)
RBC # BLD: 3.59 M/UL — LOW (ref 4.2–5.8)
RBC # FLD: 13 % — SIGNIFICANT CHANGE UP (ref 10.3–14.5)
SODIUM SERPL-SCNC: 136 MMOL/L — SIGNIFICANT CHANGE UP (ref 135–145)
SODIUM SERPL-SCNC: 137 MMOL/L — SIGNIFICANT CHANGE UP (ref 135–145)
WBC # BLD: 6.37 K/UL — SIGNIFICANT CHANGE UP (ref 3.8–10.5)
WBC # FLD AUTO: 6.37 K/UL — SIGNIFICANT CHANGE UP (ref 3.8–10.5)

## 2017-06-13 PROCEDURE — 99232 SBSQ HOSP IP/OBS MODERATE 35: CPT

## 2017-06-13 PROCEDURE — 93010 ELECTROCARDIOGRAM REPORT: CPT

## 2017-06-13 PROCEDURE — 99233 SBSQ HOSP IP/OBS HIGH 50: CPT

## 2017-06-13 PROCEDURE — 99223 1ST HOSP IP/OBS HIGH 75: CPT | Mod: GC

## 2017-06-13 RX ORDER — WARFARIN SODIUM 2.5 MG/1
5 TABLET ORAL ONCE
Qty: 0 | Refills: 0 | Status: COMPLETED | OUTPATIENT
Start: 2017-06-13 | End: 2017-06-13

## 2017-06-13 RX ADMIN — LISINOPRIL 10 MILLIGRAM(S): 2.5 TABLET ORAL at 05:42

## 2017-06-13 RX ADMIN — SODIUM CHLORIDE 3 MILLILITER(S): 9 INJECTION INTRAMUSCULAR; INTRAVENOUS; SUBCUTANEOUS at 21:41

## 2017-06-13 RX ADMIN — Medication 40 MILLIGRAM(S): at 05:42

## 2017-06-13 RX ADMIN — WARFARIN SODIUM 5 MILLIGRAM(S): 2.5 TABLET ORAL at 21:40

## 2017-06-13 RX ADMIN — Medication 400 MILLIGRAM(S): at 05:43

## 2017-06-13 RX ADMIN — HEPARIN SODIUM 2000 UNIT(S)/HR: 5000 INJECTION INTRAVENOUS; SUBCUTANEOUS at 10:19

## 2017-06-13 RX ADMIN — Medication 200 MILLIGRAM(S): at 05:42

## 2017-06-13 RX ADMIN — ATORVASTATIN CALCIUM 80 MILLIGRAM(S): 80 TABLET, FILM COATED ORAL at 21:40

## 2017-06-13 RX ADMIN — LEVETIRACETAM 500 MILLIGRAM(S): 250 TABLET, FILM COATED ORAL at 05:42

## 2017-06-13 RX ADMIN — Medication 400 MILLIGRAM(S): at 18:18

## 2017-06-13 RX ADMIN — LEVETIRACETAM 500 MILLIGRAM(S): 250 TABLET, FILM COATED ORAL at 18:18

## 2017-06-13 RX ADMIN — Medication 200 MILLIGRAM(S): at 18:18

## 2017-06-13 RX ADMIN — SODIUM CHLORIDE 3 MILLILITER(S): 9 INJECTION INTRAMUSCULAR; INTRAVENOUS; SUBCUTANEOUS at 11:08

## 2017-06-13 RX ADMIN — SODIUM CHLORIDE 3 MILLILITER(S): 9 INJECTION INTRAMUSCULAR; INTRAVENOUS; SUBCUTANEOUS at 05:53

## 2017-06-13 NOTE — PROGRESS NOTE ADULT - SUBJECTIVE AND OBJECTIVE BOX
Vascular Medicine Inpatient Progress Note    Asked by Dr. Hamm to evaluate patient with acute DVT.    s/p EUS with biopsy--tolerated well.  Remains hemodynamically stable.  Tolerating anticoagulation.  Started on warfarin--received 5 mg last night.  INR goal 2-3.    PT/INR - ( 13 Jun 2017 07:43 )   PT: 12.8 sec;   INR: 1.13 ratio    PTT - ( 13 Jun 2017 07:43 )  PTT:88.9 sec      EXAM:  CT ANGIO CHEST (W)AW IC                            PROCEDURE DATE:  06/07/2017        INTERPRETATION:  CLINICAL INFORMATION: Palpitations and leg pain. History   of PE recently taken off of anticoagulation.    COMPARISON: CT chest February 5, 2010     PROCEDURE:   CTA of the Chest was performed withintravenous contrast.  90 ml of Omnipaque 350 was injected intravenously. 10 ml were discarded.  Sagittal and coronal reformats were performed as well as 3D   Reconstructions.    FINDINGS:    CHEST:     LUNGS AND LARGE AIRWAYS: Patent central airways. Right upper lobe nodule   (8:58) measures 7 mm, unchanged from the prior study.  PLEURA: No pleural effusion.  VESSELS: There is no central, lobar, or segmental pulmonary embolus.   Evaluation of the subsegmental branches is limited secondary to body   habitus and motion artifact. Atherosclerotic calcifications of the aorta.  HEART: Heart size is normal.No pericardial effusion.  MEDIASTINUM AND WALTER: No lymphadenopathy.  CHEST WALL AND LOWER NECK: Within normal limits.  VISUALIZED UPPER ABDOMEN:Fatty atrophy of the pancreas. Indeterminant an   ill-defined soft tissue density pancreatic body lesion measuring 2.5 x   2.9 x 3.3 cm with interval increase in size since February 5, 2010   measured about 2 x 1.5 cm.  BONES: Within normal limits.    IMPRESSION:   1.  No central, lobar, or segmental pulmonary embolus. Evaluation of the   subsegmental branches is limited secondary to body habitus and motion   artifact.  2.  Soft tissue density pancreatic body lesion measuring 2.5 x 2.9 x 3.3   cmwith interval increase in size since February 5, 2010 worrisome for   neoplastic etiology. GI consultation is recommended.      ***Please see the addendum at the top of this report. It may contain   additional important information or changes.****      LINDA MUNOZ M.D., RADIOLOGY RESIDENT  This document has been electronically signed.  ANUSHKA WINTERS M.D. ATTENDING RADIOLOGIST  This document has been electronically signed. Jun 7 2017  6:58PM  Addend:  ANUSHKA WINTERS M.D. ATTENDING RADIOLOGIST  This addendum was electronically signed on: Jun 7 2017  8:14PM.            EXAM:  DUPLEX SCAN EXT VEINS LOWER BI                            PROCEDURE DATE:  06/07/2017        INTERPRETATION:  History:On a prior examination dated 3/2/2011, a short   segment of nonocclusive thrombus was identified in the right common   femoral vein, IVC filter placed, recently anticoagulation medicines had   been halted, left lower extremity swelling, rule out DVT.    There is edema within the superficial soft tissues of the left lower   extremity.    On the right, there remains intraluminal filling defects similar in   appearance to those 6 years earlier, representing the residue of prior   DVT.    There is acute DVT, almost occlusive to flow, affecting the left femoral   and common femoral veins.    The left greater saphenous vein, a superficial vein, is also thrombosed.    The calf veins were not diagnostically imaged.    IMPRESSION: There is acute almost occlusive DVT affecting the left   femoral and common femoral veins.  Superficial thrombophlebitis affects the left greater saphenous vein.      MEDICATIONS  (STANDING):  lisinopril 10milliGRAM(s) Oral daily  carBAMazepine XR Tablet 400milliGRAM(s) Oral two times a day  levETIRAcetam 500milliGRAM(s) Oral two times a day  atorvastatin 80milliGRAM(s) Oral at bedtime  furosemide    Tablet 40milliGRAM(s) Oral daily  ciprofloxacin   IVPB 400milliGRAM(s) IV Intermittent every 12 hours  heparin  Infusion. 1700Unit(s)/Hr IV Continuous <Continuous>  sodium chloride 0.9% lock flush 3milliLiter(s) IV Push every 8 hours    MEDICATIONS  (PRN):  heparin  Injectable 37041Dfdc(s) IV Push every 6 hours PRN For aPTT less than 40  heparin  Injectable 5000Unit(s) IV Push every 6 hours PRN For aPTT between 40 - 57      PHYSICAL EXAM:  Vital Signs Last 24 Hrs  T(C): 36.4, Max: 36.6 (06-12 @ 12:17)  T(F): 97.5, Max: 97.9 (06-12 @ 20:13)  HR: 66 (66 - 95)  BP: 118/74 (118/74 - 125/57)  BP(mean): --  RR: 18 (18 - 18)  SpO2: 96% (95% - 96%)    Appearance: with morbid obesity, appears chronically ill  HEENT:   Normal oral mucosa, PERRL, EOMI	  Lymphatic: No obvious lymphadenopathy  Cardiovascular: Normal S1 S2, No JVD, No murmurs,  Respiratory: Decreased BS anteriorly, poor inspiratory effort	  Psychiatry: A & O x 3, Mood & affect appropriate  Gastrointestinal:  Obese, otherwise soft, Non-tender, + BS	  Skin: +chronic venous stasis changes LLE>RLE, skin discoloration	  Neurologic: +B/L weakness  Extremities: Edema as noted above.      LABS:	 	                        11.2   6.37  )-----------( 226      ( 13 Jun 2017 07:54 )             35.0     06-13    136  |  98  |  14  ----------------------------<  150<H>  4.6   |  22  |  1.05    Ca    8.6      13 Jun 2017 07:54          Patient name: CYNTHIA TAPIA  YOB: 1950   Age: 67 (M)   MR#: [id number]  Study Date: 6/8/2017  Location: [address] [place], [place] [id number]: Alyson Emerson RDCS  Study quality: Technically difficult  Referring Physician: Bipin Hamm MD  Blood Pressure: 114/72 mmHg  Height: 180 cm  Weight: 192 kg  BSA: 2.9 m2  ------------------------------------------------------------------------  PROCEDURE: Transthoracic echocardiogram with 2-D, M-Mode  and complete spectral and color flow Doppler. Verbal  consent was obtained for injection of echo contrast  following a discussion of risks and benefits. Following  intravenous injection of contrast, harmonic imaging was  performed.  INDICATION: Chest pain, unspecified (R07.9)  ------------------------------------------------------------------------  Dimensions:    Normal Values:  LA:            2.0 - 4.0 cm  Ao:     3.4    2.0 - 3.8 cm  SEPTUM: 1.1    0.6 - 1.2 cm  PWT:    1.2    0.6 - 1.1 cm  LVIDd:  5.5    3.0 - 5.6 cm  LVIDs:  3.2    1.8 - 4.0 cm  Derived variables:  LVMI: 89 g/m2  RWT: 0.43  Fractional short: 42 %  EF (Teicholtz): 72 %  Doppler Peak Velocity (m/sec): AoV=2.0  ------------------------------------------------------------------------  Observations:  Mitral Valve: Mitral annular calcification, otherwise  normal mitral valve. Minimal mitral regurgitation.  Aortic Valve/Aorta: Aortic valve not well visualized;  appears calcified. No aortic valve regurgitation seen. Peak  left ventricular outflow tract gradient equals 3 mm Hg,  mean gradient is equal to 1 mm Hg, LVOT velocity time  integral equals 16 cm.  Aortic Root: 3.4 cm.  LVOT diameter: 2.2 cm.  Left Atrium: Normal left atrium.  Left Ventricle: Endocardial visualization enhanced with  intravenous injection of echo contrast (Definity).  Endocardium not well visualized despite the use of echo  contrast. Grossly normal LV function. Suboptimal images  precludes accurage evaluation for regional wall motion  abnormalities. Normal left ventricular internal dimensions  and wall thicknesses.  Right Heart: Normal right atrium. The right ventricle is  not well visualized; grossly normal right ventricular  systolic function. Normal tricuspid valve. Minimal  tricuspid regurgitation. Pulmonic valve not well  visualized.  Pericardium/Pleura: Normal pericardium with no pericardial  effusion.  Hemodynamic: Estimated right atrial pressure is 8 mm Hg.  Estimated right ventricular systolic pressure equals 28 mm  Hg, assuming right atrial pressure equals 8 mm Hg,  consistent with normal pulmonary pressures.  ------------------------------------------------------------------------  Conclusions:  1. Normal left ventricular internal dimensions and wall  thicknesses.  2. Endocardial visualization enhanced with intravenous  injection of echo contrast (Definity). Endocardium not well  visualized despite the use of echo contrast. Grossly normal  LV function. Suboptimal images precludes accurage  evaluation for regional wall motion abnormalities.  3. The right ventricle is notwell visualized; grossly  normal right ventricular systolic function.  *** No previous Echo exam.  ------------------------------------------------------------------------  Confirmed on  6/8/2017 - 13:05:14 by Max Soliz M.D.  ------------------------------------------------------------------------      Provoked VTE -- on anticoagulation with heparin.  Hemodynamically stable, no suggestion of respiratory distress.    --Agree with heparin gtt-->warfarin.  INR goal 2-3.  Duration of therapy may need to be extended/indefinite as long as benefits outweigh risks.  --Do not recommend IVC filter as patient is likely hypercoagulable with underlying neoplasm.  --Wound care evaluation.  --Will follow with you--thank you.

## 2017-06-13 NOTE — CONSULT NOTE ADULT - ASSESSMENT
67 year with history of a CVA with left hemiparesis, DVT's in the past with PE's, morbid obesity, seizure presenting with a DVT, on treatment, now found to have ventricular tachycardia.

## 2017-06-13 NOTE — CHART NOTE - NSCHARTNOTEFT_GEN_A_CORE
EVENT:    SUBJECTIVE:    OBJECTIVE:  Vital Signs Last 24 Hrs  T(C): 36.5, Max: 36.6 (06-12 @ 20:13)  T(F): 97.7, Max: 97.9 (06-12 @ 20:13)  HR: 58 (58 - 67)  BP: 103/68 (103/68 - 125/57)  BP(mean): --  RR: 19 (18 - 19)  SpO2: 97% (95% - 97%)    LABS:                        11.2   6.37  )-----------( 226      ( 13 Jun 2017 07:54 )             35.0     06-13    136  |  98  |  14  ----------------------------<  150<H>  4.6   |  22  |  1.05    Ca    8.6      13 Jun 2017 07:54        EKG:   IMGAGING:    ASSESSMENT:  HPI:  66 yo morbidly obese male history of a CVA causing left sided paralysis over 10 years ago, DVT's in the past with PE's, recently stopped coumadin as recommended by his PCP which he was on for years coming in with worsening LLE pain where he has had DVT's in the past.  No chest pain, + SoB and palp.  No cough no fevers no chills.  Pt lives at home where he has 24 hour nursing care.  Any movement of the leg makes pain worse, nothing makes it better. pt denies recent weight loss, loss of appetite pts family next to pts bedside (07 Jun 2017 23:38) Cardia monitor with 32 beats of asymptomatic  Wide complex tachycardia  . Will check magnesium level and d/w cardiology team and attending.      PLAN: Continue tele monitoring             Stat Magnesium level             Monitor vital signs

## 2017-06-13 NOTE — DIETITIAN INITIAL EVALUATION ADULT. - ORAL INTAKE PTA
Pt reports taking prescribed a prescribed vitamin, unable to recall name, noted Vitamin D listed in H&P./good

## 2017-06-13 NOTE — CONSULT NOTE ADULT - CONSULT REASON
dvt, panc mass, possible need for procedure
Evaluation of Pancreatic tumor
Pre-op eval  Hx PE-DVT
leg wounds
panc mass
wide complex tachycardia
Acute DVT, prior DVT/PE, pancreatic mass

## 2017-06-13 NOTE — DIETITIAN INITIAL EVALUATION ADULT. - ENERGY NEEDS
Ht: 72“, Wt: 408 lbs-stated, noted weights: 400.5 and 423.2 pounds, BMI: 55.3 kg/m2, IBW: 178 pounds (+/-10%), %IBW: 229%  Pertinent Information: Pt admitted with increased LE pain after recently stopping coumadin. Found to have deep vein thrombosis (DVT) of both lower extremities, started on coumadin. No evidence of PE. Incidentally noted pancreatic lesion- s/p EUS c/s cyst.   3+ left foot, left leg, 4+right foot & right leg edema, no pressure injury

## 2017-06-13 NOTE — DISCHARGE NOTE ADULT - CARE PLAN
Principal Discharge DX:	Deep vein thrombosis (DVT) of both lower extremities, unspecified chronicity, unspecified vein  Goal:	Resolution of DVT  Instructions for follow-up, activity and diet:	Continue medications as prescribed.   You have been started on a new medication called Coumadin (Warfarin) which is a blood thinner to protect you from further clots because you have a new DVT (deep vein thrombosis) in your Left Leg.  It is CRITICAL that you take this medication each evening as prescribed and follow up with your Primary care physician on Tuesday June 20 to have your INR blood level checked and possibly have your Coumadin dose adjusted to be sure you are properly anticoagulated (blood thinned) and also not too thinned.  You are at a greater risk for bleeding on this medication so you must be especially careful to protect yourself from injury. You will need to apply direct pressure to any bleeding area or bruising area a bit longer to stop any bleeding.  If you hit your head report this to your MD promptly as there is a possible risk for bleeding into your brain. You may need to come to the hospital for evaluation and treatment if this occurs or you have bleeding anywhere that is not stopping.  Secondary Diagnosis:	H. pylori infection  Instructions for follow-up, activity and diet:	Your endoscopy of your stomach revealed an infection called H. Pylori. This will require medication to treat. Please follow up with Dr Levy the Gastroenterologist within 1 week for further medications and management. Call office for appointment.  Secondary Diagnosis:	Obesity  Instructions for follow-up, activity and diet:	It will help your health if you lose weight.   Your weight contributes to your overall wellness and illness state.  Following a balanced diet, avoiding sweets and controlling calorie intake will help you do this.   Ask your Primary MD to assist you with this process. Principal Discharge DX:	Deep vein thrombosis (DVT) of both lower extremities, unspecified chronicity, unspecified vein  Goal:	Resolution of DVT  Instructions for follow-up, activity and diet:	Continue medications as prescribed.   You have been started on a new medication called Coumadin (Warfarin) which is a blood thinner to protect you from further clots because you have a new DVT (deep vein thrombosis) in your Left Leg.  It is CRITICAL that you take this medication each evening as prescribed and follow up with your Primary care physician on Tuesday June 20 to have your INR blood level checked and possibly have your Coumadin dose adjusted to be sure you are properly anticoagulated (blood thinned) and also not too thinned. Dr Walton's office has been notified and is arranging a home blood draw for Tuesday 6/20. Please call her office on Monday 6/19 to confirm arrangements and follow up results with Dr Walton on 6/20.  You are at a greater risk for bleeding on this medication so you must be especially careful to protect yourself from injury. You will need to apply direct pressure to any bleeding area or bruising area a bit longer to stop any bleeding.  If you hit your head report this to your MD promptly as there is a possible risk for bleeding into your brain. You may need to come to the hospital for evaluation and treatment if this occurs or you have bleeding anywhere that is not stopping.  INR: 1.97 on 6/16/2017  Secondary Diagnosis:	H. pylori infection  Instructions for follow-up, activity and diet:	Your endoscopy of your stomach revealed an infection called H. Pylori. This will require medication to treat. Please follow up with Dr Levy the Gastroenterologist within 1 week for further medications and management. Call office for appointment.  Secondary Diagnosis:	Obesity  Instructions for follow-up, activity and diet:	It will help your health if you lose weight.   Your weight contributes to your overall wellness and illness state.  Following a balanced diet, avoiding sweets and controlling calorie intake will help you do this.   Ask your Primary MD to assist you with this process.

## 2017-06-13 NOTE — CONSULT NOTE ADULT - SUBJECTIVE AND OBJECTIVE BOX
Date of Consult:6/13/2017    CHIEF COMPLAINT: wide complex tachycardia     HISTORY OF PRESENT ILLNESS:  Mr. Amilcar Ortega is a 67 year with history of a CVA with left hemiparesis, DVT's in the past with PE's, morbid obesity, seizure d/o, expressive aphasia who presented intitally to the hospital with DVT. He was followed by the vascular medicine team, with a heparin to coumadin transition. Today, patient had 32 beats of wide complex tachycardia (break in the middle for one sinus beat). Patient denies any chest pain, shortness of breath, palpitations. States he has never had an ischemic evaluation in the past.     Allergies    No Known Allergies    Intolerances    	  MEDICATIONS:  lisinopril 10milliGRAM(s) Oral daily  furosemide    Tablet 40milliGRAM(s) Oral daily  heparin  Infusion. 1700Unit(s)/Hr IV Continuous <Continuous>  heparin  Injectable 99957Njyj(s) IV Push every 6 hours PRN  heparin  Injectable 5000Unit(s) IV Push every 6 hours PRN  warfarin 5milliGRAM(s) Oral once  ciprofloxacin   IVPB 400milliGRAM(s) IV Intermittent every 12 hours  carBAMazepine XR Tablet 400milliGRAM(s) Oral two times a day  levETIRAcetam 500milliGRAM(s) Oral two times a day  atorvastatin 80milliGRAM(s) Oral at bedtime      PAST MEDICAL & SURGICAL HISTORY:  Pulmonary embolism  Obesity  Seizure disorder  Hemiplegia Affecting Dominant Side  Aphasia: Expressive  Hypercholesteremia  CVA (Cerebral Vascular Accident)  Pulmonary embolism: wyatt filter  S/P cataract extraction and insertion of intraocular lens, right: 9/16/2010  No Past Surgical History      FAMILY HISTORY:  No pertinent family history in first degree relatives      SOCIAL HISTORY:    [ ] Non-smoker  [ ] Smoker  [ ] Alcohol      REVIEW OF SYSTEMS:  See HPI. Otherwise, 10 point ROS done and otherwise negative.    PHYSICAL EXAM:  T(C): 36.5, Max: 36.6 (06-12 @ 20:13)  HR: 58 (58 - 67)  BP: 103/68 (103/68 - 125/57)  RR: 19 (18 - 19)  SpO2: 97% (95% - 97%)  Wt(kg): --  I&O's Summary  I & Os for 24h ending 13 Jun 2017 07:00  =============================================  IN: 1400 ml / OUT: 2400 ml / NET: -1000 ml    I & Os for current day (as of 13 Jun 2017 18:57)  =============================================  IN: 720 ml / OUT: 1180 ml / NET: -460 ml      Appearance: Normal. morbidly obese  HEENT:   Normal oral mucosa, PERRL, EOMI	  Lymphatic: b/l venous stasis in LE  Cardiovascular: Normal S1 S2, No JVD, No murmurs, No edema  Respiratory: Lungs clear to auscultation	  Psychiatry: A & O x 3, Mood & affect appropriate  Gastrointestinal:  Soft, Non-tender, + BS	  Skin: No rashes, No ecchymoses, No cyanosis	  Neurologic: Non-focal  Extremities: Normal range of motion, No clubbing, cyanosis or edema  Vascular: Peripheral pulses palpable 2+ bilaterally      LABS:	 	    CBC Full  -  ( 13 Jun 2017 07:54 )  WBC Count : 6.37 K/uL  Hemoglobin : 11.2 g/dL  Hematocrit : 35.0 %  Platelet Count - Automated : 226 K/uL  Mean Cell Volume : 97.5 fl  Mean Cell Hemoglobin : 31.2 pg  Mean Cell Hemoglobin Concentration : 32.0 gm/dL  Auto Neutrophil # : x  Auto Lymphocyte # : x  Auto Monocyte # : x  Auto Eosinophil # : x  Auto Basophil # : x  Auto Neutrophil % : x  Auto Lymphocyte % : x  Auto Monocyte % : x  Auto Eosinophil % : x  Auto Basophil % : x    06-13    137  |  100  |  16  ----------------------------<  132<H>  4.5   |  27  |  1.12  06-13    136  |  98  |  14  ----------------------------<  150<H>  4.6   |  22  |  1.05    Ca    8.9      13 Jun 2017 17:39  Ca    8.6      13 Jun 2017 07:54  Mg     2.1     06-13      TELEMETRY: NSR 80's except for 32 beats of WCT this morning at 11 am    ECG:  	  RADIOLOGY:  OTHER: 	    PREVIOUS DIAGNOSTIC TESTING:    [ ] Echocardiogram: 6/2017: 1. Normal left ventricular internal dimensions and wall  thicknesses.  2. Endocardial visualization enhanced with intravenous  injection of echo contrast (Definity). Endocardium not well  visualized despite the use of echo contrast. Grossly normal  LV function. Suboptimal images precludes accurage  evaluation for regional wall motion abnormalities.  3. The right ventricle is not well visualized; grossly  normal right ventricular systolic function.  *** No previous Echo exam.  [ ]  Catheterization:  [ ] Stress Test:  	  	  ASSESSMENT/PLAN:

## 2017-06-13 NOTE — CONSULT NOTE ADULT - CONSULT REQUESTED DATE/TIME
08-Jun-2017 06:13
08-Jun-2017 10:23
07-Jun-2017 19:54
08-Jun-2017 11:38
08-Jun-2017 12:00
09-Jun-2017 09:19
13-Jun-2017 18:57

## 2017-06-13 NOTE — DIETITIAN INITIAL EVALUATION ADULT. - NS AS NUTRI INTERV ED CONTENT
Attempted to provide general healthful nutrition recommendations, briefly reinforced healthful food choices. Pt declined in-depth nutrition education and written materials. Went over [facility] "Taking Warfarin Safely" booklet already at bedside. Discussed interaction between vitamin K and Coumadin, discussed point system and keeping vitamin K intake consistent.

## 2017-06-13 NOTE — PROGRESS NOTE ADULT - ASSESSMENT
68 y/o M with history of provoked PE-DVT 2nd tobacco use and immobility s/p [place] filter (90s), CVA with residual R hemiplegia (2001), seizure history, primarily bedbound now. Admitted with increased LE pain after recently stopping coumadin. Found to have acute on chronic DVT. No evidence of PE. Incidentally noted pancreatic lesion being recommended for EUS. Called for pre-procedural assessment. Tolerated EUS w/o incident.

## 2017-06-13 NOTE — DISCHARGE NOTE ADULT - HOSPITAL COURSE
To be completed by Attending MD 66 y/o male PMH CVA causing left sided paralysis over 10 years ago, DVT's in the past with PE's, recently stopped coumadin as recommended by his PCP which he was on for years coming in with worsening LLE pain where he has had DVT's in the past.  No chest pain, + SoB and palp.  No cough no fevers no chills.  Pt lives at home where he has 24 hour nursing care.      Dx: Left femoral and common femoral vein DVT        Pancreatic multicystic microadenoma, no surgical interventions   Problem: Deep vein thrombosis (DVT) of both lower extremities, unspecified chronicity, unspecified vein.  Plan: cont ac with heparin, started on coumadin   vascular medicine evaluated pt , abd cxr with IVC filter in place  cards evaluated pt- echo is nl lv fx.     Problem/Plan - 2:  ·  Problem: Pancreatic mass.  Plan: s/p EUS c/w cyst.     Problem/Plan - 3:  ·  Problem: Seizure disorder.  Plan: cont current dose of anticonvulsants.

## 2017-06-13 NOTE — PROGRESS NOTE ADULT - SUBJECTIVE AND OBJECTIVE BOX
Follow-up Pulm Progress Note    No new respiratory events overnight.  Denies SOB/CP.     Medications:  MEDICATIONS  (STANDING):  lisinopril 10milliGRAM(s) Oral daily  carBAMazepine XR Tablet 400milliGRAM(s) Oral two times a day  levETIRAcetam 500milliGRAM(s) Oral two times a day  atorvastatin 80milliGRAM(s) Oral at bedtime  furosemide    Tablet 40milliGRAM(s) Oral daily  ciprofloxacin   IVPB 400milliGRAM(s) IV Intermittent every 12 hours  heparin  Infusion. 1700Unit(s)/Hr IV Continuous <Continuous>  sodium chloride 0.9% lock flush 3milliLiter(s) IV Push every 8 hours    MEDICATIONS  (PRN):  heparin  Injectable 76134Wsbh(s) IV Push every 6 hours PRN For aPTT less than 40  heparin  Injectable 5000Unit(s) IV Push every 6 hours PRN For aPTT between 40 - 57      Vent settings (if applicable)      Vital Signs Last 24 Hrs  T(C): 36.4, Max: 36.6 (06-12 @ 12:17)  T(F): 97.5, Max: 97.9 (06-12 @ 20:13)  HR: 66 (66 - 95)  BP: 118/74 (118/74 - 125/57)  BP(mean): --  RR: 18 (18 - 18)  SpO2: 96% (95% - 96%) on RA      LABS:                        11.0   6.55  )-----------( 224      ( 12 Jun 2017 08:36 )             34.4     06-12    138  |  101  |  16  ----------------------------<  109<H>  4.3   |  21<L>  |  1.05    Ca    8.7      12 Jun 2017 08:36            CAPILLARY BLOOD GLUCOSE    PT/INR - ( 13 Jun 2017 07:43 )   PT: 12.8 sec;   INR: 1.13 ratio         PTT - ( 13 Jun 2017 07:43 )  PTT:88.9 sec      CULTURES:    Physical Examination:  PULM: Clear to auscultation bilaterally, no significant sputum production  CVS: Regular rate and rhythm, no murmurs, rubs, or gallops

## 2017-06-13 NOTE — DIETITIAN INITIAL EVALUATION ADULT. - ADHERENCE
Pt reports unrestricted diet PTA. Pt reports frequent takeout as he is unable to get to the supermarket or around. Pt reports having 24 hour care. Typical intake: coffee and [employer] for breakfast; nothing or takeout for lunch; Italian food or Chinese food. Pt admits to large portions and undesirable food choices.

## 2017-06-13 NOTE — DISCHARGE NOTE ADULT - CARE PROVIDERS DIRECT ADDRESSES
[email or web address]

## 2017-06-13 NOTE — DISCHARGE NOTE ADULT - CARE PROVIDER_API CALL
Neto Azevedo), Internal Medicine  [address] [place] [address]  Phone: [phone]  Fax: [phone] Neto Azevedo), Internal Medicine  [address] [place] [address]  Phone: [phone]  Fax: [phone]    Lindsay Walton), Internal Medicine  [address] [place] [address] [place] [address]  Phone: [phone]  Fax: [phone]    Rosalio Levy (DO), Gastroenterology; Internal Medicine  [address] [place] [address]  Phone: [phone]  Fax: [phone]

## 2017-06-13 NOTE — DISCHARGE NOTE ADULT - HOME CARE AGENCY
[facility] for homecare:[phone]:RN to call in 1-3 days.[facility] for aide at [phone]

## 2017-06-13 NOTE — DIETITIAN INITIAL EVALUATION ADULT. - OTHER INFO
Nutrition assessment for length of stay. Pt unaware of wt changes reports recently weighing around 408 pounds, noted dosing wt of 423.2 pounds and bed wt on 6/9 was 400.5 pounds. Pt amenable to brief verbal reinforcement of general healthful recommendations but declined written materials stating he knows what to do. Pt reports good po intake during admission. No GI distress at this time. Last bowel movement was today. No chewing or swallowing difficulties at this time. No known food allergies.

## 2017-06-13 NOTE — DIETITIAN INITIAL EVALUATION ADULT. - NS AS NUTRI INTERV MEALS SNACK
Consider DASH/TLC diet. Monitor po intake, GI tolerance, weight and lab values. RD to remain available for further nutritional interventions as indicated.

## 2017-06-13 NOTE — DISCHARGE NOTE ADULT - PLAN OF CARE
Your endoscopy of your stomach revealed an infection called H. Pylori. This will require medication to treat. Please follow up with Dr Levy the Gastroenterologist within 1 week for further medications and management. Call office for appointment. Resolution of DVT Continue medications as prescribed.   You have been started on a new medication called Coumadin (Warfarin) which is a blood thinner to protect you from further clots because you have a new DVT (deep vein thrombosis) in your Left Leg.  It is CRITICAL that you take this medication each evening as prescribed and follow up with your Primary care physician on Tuesday June 20 to have your INR blood level checked and possibly have your Coumadin dose adjusted to be sure you are properly anticoagulated (blood thinned) and also not too thinned.  You are at a greater risk for bleeding on this medication so you must be especially careful to protect yourself from injury. You will need to apply direct pressure to any bleeding area or bruising area a bit longer to stop any bleeding.  If you hit your head report this to your MD promptly as there is a possible risk for bleeding into your brain. You may need to come to the hospital for evaluation and treatment if this occurs or you have bleeding anywhere that is not stopping. It will help your health if you lose weight.   Your weight contributes to your overall wellness and illness state.  Following a balanced diet, avoiding sweets and controlling calorie intake will help you do this.   Ask your Primary MD to assist you with this process. Continue medications as prescribed.   You have been started on a new medication called Coumadin (Warfarin) which is a blood thinner to protect you from further clots because you have a new DVT (deep vein thrombosis) in your Left Leg.  It is CRITICAL that you take this medication each evening as prescribed and follow up with your Primary care physician on Tuesday June 20 to have your INR blood level checked and possibly have your Coumadin dose adjusted to be sure you are properly anticoagulated (blood thinned) and also not too thinned. Dr Walton's office has been notified and is arranging a home blood draw for Tuesday 6/20. Please call her office on Monday 6/19 to confirm arrangements and follow up results with Dr Walton on 6/20.  You are at a greater risk for bleeding on this medication so you must be especially careful to protect yourself from injury. You will need to apply direct pressure to any bleeding area or bruising area a bit longer to stop any bleeding.  If you hit your head report this to your MD promptly as there is a possible risk for bleeding into your brain. You may need to come to the hospital for evaluation and treatment if this occurs or you have bleeding anywhere that is not stopping.  INR: 1.97 on 6/16/2017

## 2017-06-13 NOTE — DISCHARGE NOTE ADULT - ADDITIONAL INSTRUCTIONS
Follow up with Dr Walton your Primary MD on Tuesday June 20th for your INR blood check and Coumadin dose adjustment. Call office for appointment.  Follow up with Dr Levy your Gastroenterologist within 1 week for treatment of the H. Pylori infection in your stomach. Call office for appointment.

## 2017-06-13 NOTE — PROVIDER CONTACT NOTE (OTHER) - ASSESSMENT
pt complained to NP of left sided chest pain 2/10 "on and off", nonradiating; states has been having this pain on and off for last 2 months

## 2017-06-13 NOTE — PROGRESS NOTE ADULT - SUBJECTIVE AND OBJECTIVE BOX
INTERVAL HPI/OVERNIGHT EVENTS: stable from GI standpoint. Discussed EUS findings and path.     MEDICATIONS  (STANDING):  lisinopril 10milliGRAM(s) Oral daily  carBAMazepine XR Tablet 400milliGRAM(s) Oral two times a day  levETIRAcetam 500milliGRAM(s) Oral two times a day  atorvastatin 80milliGRAM(s) Oral at bedtime  furosemide    Tablet 40milliGRAM(s) Oral daily  ciprofloxacin   IVPB 400milliGRAM(s) IV Intermittent every 12 hours  heparin  Infusion. 1700Unit(s)/Hr IV Continuous <Continuous>  sodium chloride 0.9% lock flush 3milliLiter(s) IV Push every 8 hours    MEDICATIONS  (PRN):  heparin  Injectable 14370Fgek(s) IV Push every 6 hours PRN For aPTT less than 40  heparin  Injectable 5000Unit(s) IV Push every 6 hours PRN For aPTT between 40 - 57      Allergies    No Known Allergies    Intolerances            PHYSICAL EXAM:   Vital Signs:  Vital Signs Last 24 Hrs  T(C): 36.4, Max: 36.6 (06-12 @ 12:17)  T(F): 97.5, Max: 97.9 (06-12 @ 20:13)  HR: 66 (66 - 95)  BP: 118/74 (118/74 - 125/57)  BP(mean): --  RR: 18 (18 - 18)  SpO2: 96% (95% - 96%)  Daily     Daily     GENERAL:  no distress  HEENT:  NC/AT,  anicteric  CHEST:   no increased effort, breath sounds clear  HEART:  Regular rhythm  ABDOMEN:  Soft, non-tender, non-distended, normoactive bowel sounds,  no masses ,no hepato-splenomegaly, no signs of chronic liver disease-obesity limits exam    LABS:                        11.0   6.55  )-----------( 224      ( 12 Jun 2017 08:36 )             34.4     06-12    138  |  101  |  16  ----------------------------<  109<H>  4.3   |  21<L>  |  1.05    Ca    8.7      12 Jun 2017 08:36      PT/INR - ( 12 Jun 2017 08:36 )   PT: 12.0 sec;   INR: 1.06 ratio         PTT - ( 12 Jun 2017 08:36 )  PTT:89.4 sec      RADIOLOGY & ADDITIONAL TESTS:

## 2017-06-13 NOTE — PROGRESS NOTE ADULT - SUBJECTIVE AND OBJECTIVE BOX
[facility] - Yan Loaiza, Carly, Jen, Tomy Azevedo  Office Number:  [phone]    Patient resting comfortably in bed in NAD.  Laying flat with no respiratory distress.  No complaints of chest pain, dyspnea, palpitations, PND, or orthopnea.    Telemetry:  Normal sinus rhythm, 50-60.  NO events.    MEDICATIONS  (STANDING):  lisinopril 10milliGRAM(s) Oral daily  carBAMazepine XR Tablet 400milliGRAM(s) Oral two times a day  levETIRAcetam 500milliGRAM(s) Oral two times a day  atorvastatin 80milliGRAM(s) Oral at bedtime  furosemide    Tablet 40milliGRAM(s) Oral daily  ciprofloxacin   IVPB 400milliGRAM(s) IV Intermittent every 12 hours  heparin  Infusion. 1700Unit(s)/Hr IV Continuous <Continuous>  sodium chloride 0.9% lock flush 3milliLiter(s) IV Push every 8 hours    MEDICATIONS  (PRN):  heparin  Injectable 83254Ecrw(s) IV Push every 6 hours PRN For aPTT less than 40  heparin  Injectable 5000Unit(s) IV Push every 6 hours PRN For aPTT between 40 - 57      Allergies    No Known Allergies    Vital Signs Last 24 Hrs  T(C): 36.4, Max: 36.6 (06-12 @ 12:17)  T(F): 97.5, Max: 97.9 (06-12 @ 20:13)  HR: 66 (66 - 95)  BP: 118/74 (118/74 - 125/57)  BP(mean): --  RR: 18 (18 - 18)  SpO2: 96% (95% - 96%)    I&O's Summary    I & Os for current day (as of 13 Jun 2017 07:07)  =============================================  IN: 960 ml / OUT: 2400 ml / NET: -1440 ml      ON EXAM:  General: NAD, awake and alert, oriented x 3  HEENT: Mucous membranes are moist, anicteric  Lungs: Non-labored, breath sounds are clear bilaterally, No wheezing, rales or rhonchi.  Decreased breath sounds bilaterally  Cardiovascular: Regular, S1 and S2, no murmurs, rubs, or gallops. Distant heart sounds  Gastrointestinal: Bowel Sounds present, soft, nontender.   Obese  Lymph: 2+ peripheral edema. No lymphadenopathy.  Skin: b/l LE venous stasis changes    LABS: All Labs Reviewed:                        11.0   6.55  )-----------( 224      ( 12 Jun 2017 08:36 )             34.4                         11.1   6.67  )-----------( 205      ( 11 Jun 2017 08:26 )             33.7                         11.3   6.63  )-----------( 199      ( 10 Trevon 2017 11:18 )             34.6     12 Jun 2017 08:36    138    |  101    |  16     ----------------------------<  109    4.3     |  21     |  1.05   11 Jun 2017 08:27    137    |  101    |  14     ----------------------------<  100    4.2     |  22     |  1.02     Ca    8.7        12 Jun 2017 08:36  Ca    8.5        11 Jun 2017 08:27      PT/INR - ( 12 Jun 2017 08:36 )   PT: 12.0 sec;   INR: 1.06 ratio         PTT - ( 12 Jun 2017 08:36 )  PTT:89.4 sec      Assessment/Plan: 67 y.o. male history of a CVA with left hemiparesis, DVT's in the past with PE's, morbid obesity, sz d/o, expressive aphasia who presents with acute DVT having had warfarin dc 2w ago, and with pancreatic mass s/p EUS with biopsy, results consistent with pancreatic cyst:    -tolerated procedure well with no cardiac complications  -no acute ischemia  -no significant arrhythmias noted, can d/c telemetry monitor  -edema with venous stasis, can cont po lasix  --heparin gtt to be continued with goal PTT 60-90.  Dose Coumadin. Goal INR 2-3.   -echo unrevealing  -monitor and replete electrolytes  -supp oxygen as needed  - All other workup per primary team. Will followup.

## 2017-06-13 NOTE — DISCHARGE NOTE ADULT - NSFTFHOME1RD_GEN_ALL_CORE
Stroke/TBI (neurological/cognitive impairment)/Bed bound/Requires supervison due to deteriorating mental status/Fall risk/Shortness of breath with minimal ambulation/Reason specified below/Ataxic gait

## 2017-06-13 NOTE — DISCHARGE NOTE ADULT - MEDICATION SUMMARY - MEDICATIONS TO TAKE
I will START or STAY ON the medications listed below when I get home from the hospital:    lisinopril 10 mg oral tablet  -- 1 tab(s) by mouth once a day  -- Indication: For CAD    warfarin 5 mg oral tablet  -- 5 milligram(s) by mouth once a day (at bedtime)  -- Do not take this drug if you are pregnant.  It is very important that you take or use this exactly as directed.  Do not skip doses or discontinue unless directed by your doctor.  Obtain medical advice before taking any non-prescription drugs as some may affect the action of this medication.    -- Indication: For DVT (deep venous thrombosis)    carBAMazepine 400 mg oral tablet, extended release  -- 3 tab(s) by mouth 2 times a day *see comments*  -- Prescription is written for 1 tablet 2 times a day but patient states he takes 3 tablets 2 times a day instead  -- Indication: For Seizure disorder    levETIRAcetam 500 mg oral tablet  -- 1 tab(s) by mouth 2 times a day  -- Indication: For Seizure disorder    atorvastatin 80 mg oral tablet  -- 1 tab(s) by mouth once a day  -- Indication: For hyperlipidemia    metoprolol  -- 12.5 milligram(s) by mouth 2 times a day  -- Indication: For NSVT (nonsustained ventricular tachycardia)    furosemide 40 mg oral tablet  -- 1 tab(s) by mouth once a day  -- Indication: For Diuresis    potassium chloride 20 mEq oral tablet, extended release  -- 1 tab(s) by mouth once a day  -- Indication: For Suppliment    Nuedexta 20 mg-10 mg oral capsule  -- 1 cap(s) by mouth 2 times a day  -- Indication: For Hx CVA    Vitamin D2 50,000 intl units (1.25 mg) oral capsule  -- 1 cap(s) by mouth once a week  -- Indication: For Suppliment

## 2017-06-13 NOTE — CONSULT NOTE ADULT - PROBLEM SELECTOR RECOMMENDATION 9
Run of tachycardia this morning meets criteria for ventricular tachycardia. He does not have known ischemic disease evoking a scar mediated pathophysiology. Electrolytes from the AM within normal.  -would check electrolytes, Mg >2, K >4  -would start a low dose metoprolol 12.5 BID for ventricular ectopy  -recommend stress test to access any ischemic driven process, if patient has not had one recently   -discussed plan with primary team and patients cardiologist  -will follow with you     Kevin Cruz MD  [id number]
-Plan for EUS for pancreatic mass  -VBG shows no evidence of chronic hypercapnea. Though likely he has ISIDRO  -Outpt PSG  -Pre-procedural risk assessment to be documented by attending

## 2017-06-13 NOTE — CHART NOTE - NSCHARTNOTEFT_GEN_A_CORE
Upon Nutritional Assessment by the Registered Dietitian your patient was determined to meet criteria / has evidence of the following diagnosis/diagnoses:          [ ]  Mild Protein Calorie Malnutrition        [ ]  Moderate Protein Calorie Malnutrition        [ ] Severe Protein Calorie Malnutrition        [ ] Unspecified Protein Calorie Malnutrition        [ ] Underweight / BMI <19        [X] Morbid Obesity / BMI > 40      Findings as based on:  [X] Comprehensive nutrition assessment   [ ] Nutrition Focused Physical Exam  [ ] Other:       Nutrition Plan/Recommendations:    Consider DASH/TLC diet. Nutrition education      PROVIDER Section:     By signing this assessment you are acknowledging and agree with the diagnosis/diagnoses assigned by the Registered Dietitian    Comments:

## 2017-06-14 ENCOUNTER — TRANSCRIPTION ENCOUNTER (OUTPATIENT)
Age: 67
End: 2017-06-14

## 2017-06-14 DIAGNOSIS — I47.2 VENTRICULAR TACHYCARDIA: ICD-10-CM

## 2017-06-14 LAB
APTT BLD: 78.9 SEC — HIGH (ref 27.5–37.4)
HCT VFR BLD CALC: 34.5 % — LOW (ref 39–50)
HGB BLD-MCNC: 11.5 G/DL — LOW (ref 13–17)
INR BLD: 1.39 RATIO — HIGH (ref 0.88–1.16)
MCHC RBC-ENTMCNC: 32.4 PG — SIGNIFICANT CHANGE UP (ref 27–34)
MCHC RBC-ENTMCNC: 33.3 GM/DL — SIGNIFICANT CHANGE UP (ref 32–36)
MCV RBC AUTO: 97.2 FL — SIGNIFICANT CHANGE UP (ref 80–100)
PLATELET # BLD AUTO: 209 K/UL — SIGNIFICANT CHANGE UP (ref 150–400)
PROTHROM AB SERPL-ACNC: 15.8 SEC — HIGH (ref 10–13.1)
RBC # BLD: 3.55 M/UL — LOW (ref 4.2–5.8)
RBC # FLD: 13.3 % — SIGNIFICANT CHANGE UP (ref 10.3–14.5)
WBC # BLD: 7.04 K/UL — SIGNIFICANT CHANGE UP (ref 3.8–10.5)
WBC # FLD AUTO: 7.04 K/UL — SIGNIFICANT CHANGE UP (ref 3.8–10.5)

## 2017-06-14 PROCEDURE — 99233 SBSQ HOSP IP/OBS HIGH 50: CPT

## 2017-06-14 PROCEDURE — 99232 SBSQ HOSP IP/OBS MODERATE 35: CPT

## 2017-06-14 RX ORDER — WARFARIN SODIUM 2.5 MG/1
10 TABLET ORAL ONCE
Qty: 0 | Refills: 0 | Status: COMPLETED | OUTPATIENT
Start: 2017-06-14 | End: 2017-06-14

## 2017-06-14 RX ORDER — METOPROLOL TARTRATE 50 MG
12.5 TABLET ORAL
Qty: 0 | Refills: 0 | Status: DISCONTINUED | OUTPATIENT
Start: 2017-06-14 | End: 2017-06-16

## 2017-06-14 RX ADMIN — Medication 400 MILLIGRAM(S): at 06:02

## 2017-06-14 RX ADMIN — LEVETIRACETAM 500 MILLIGRAM(S): 250 TABLET, FILM COATED ORAL at 18:05

## 2017-06-14 RX ADMIN — WARFARIN SODIUM 10 MILLIGRAM(S): 2.5 TABLET ORAL at 22:07

## 2017-06-14 RX ADMIN — Medication 200 MILLIGRAM(S): at 05:52

## 2017-06-14 RX ADMIN — SODIUM CHLORIDE 3 MILLILITER(S): 9 INJECTION INTRAMUSCULAR; INTRAVENOUS; SUBCUTANEOUS at 05:55

## 2017-06-14 RX ADMIN — HEPARIN SODIUM 2000 UNIT(S)/HR: 5000 INJECTION INTRAVENOUS; SUBCUTANEOUS at 08:14

## 2017-06-14 RX ADMIN — LISINOPRIL 10 MILLIGRAM(S): 2.5 TABLET ORAL at 05:52

## 2017-06-14 RX ADMIN — Medication 400 MILLIGRAM(S): at 18:05

## 2017-06-14 RX ADMIN — Medication 40 MILLIGRAM(S): at 05:52

## 2017-06-14 RX ADMIN — ATORVASTATIN CALCIUM 80 MILLIGRAM(S): 80 TABLET, FILM COATED ORAL at 22:07

## 2017-06-14 RX ADMIN — LEVETIRACETAM 500 MILLIGRAM(S): 250 TABLET, FILM COATED ORAL at 05:52

## 2017-06-14 RX ADMIN — SODIUM CHLORIDE 3 MILLILITER(S): 9 INJECTION INTRAMUSCULAR; INTRAVENOUS; SUBCUTANEOUS at 12:08

## 2017-06-14 RX ADMIN — SODIUM CHLORIDE 3 MILLILITER(S): 9 INJECTION INTRAMUSCULAR; INTRAVENOUS; SUBCUTANEOUS at 22:07

## 2017-06-14 RX ADMIN — Medication 12.5 MILLIGRAM(S): at 18:08

## 2017-06-14 NOTE — PROVIDER CONTACT NOTE (OTHER) - BACKGROUND
dx DVT, pancreatic multicystic microadenoma    hx CVA with R sided weakness, obesity, PEs, seziures, HLD

## 2017-06-14 NOTE — PROVIDER CONTACT NOTE (OTHER) - SITUATION
pt's right forearm around IV site noticed pinkish, swollen, warm; was getting ciprofloxacin in it early am, stated previous episode happened yesterday evening on the other arm

## 2017-06-14 NOTE — PHYSICAL THERAPY INITIAL EVALUATION ADULT - PERTINENT HX OF CURRENT PROBLEM, REHAB EVAL
68 yo morbidly obese male history of a CVA causing left sided paralysis over 10 years ago, DVT's in the past with PE's, recently stopped coumadin as recommended by his PCP which he was on for years coming in with worsening LLE pain where he has had DVT's in the past.  No chest pain, + SoB. acute on chronic b/l LE DVT. no PE.

## 2017-06-14 NOTE — PROGRESS NOTE ADULT - SUBJECTIVE AND OBJECTIVE BOX
[facility] - Yan Loaiza, Jen Carroll Pannella, Patel  Office Number:  [phone]    Patient resting comfortably in bed in NAD.  Laying flat with no respiratory distress.  Patient had 32 beats WCT yesterday consistent with VT.  He also had an episode of rest 2/10 chest pain yesterday.  EKG done and shows sinus rhythm with nonspecific ST changes, unchanged from a prior tracing.  No ischemic changes.    MEDICATIONS  (STANDING):  lisinopril 10milliGRAM(s) Oral daily  carBAMazepine XR Tablet 400milliGRAM(s) Oral two times a day  levETIRAcetam 500milliGRAM(s) Oral two times a day  atorvastatin 80milliGRAM(s) Oral at bedtime  furosemide    Tablet 40milliGRAM(s) Oral daily  heparin  Infusion. 1700Unit(s)/Hr IV Continuous <Continuous>  sodium chloride 0.9% lock flush 3milliLiter(s) IV Push every 8 hours    MEDICATIONS  (PRN):  heparin  Injectable 24732Hkgi(s) IV Push every 6 hours PRN For aPTT less than 40  heparin  Injectable 5000Unit(s) IV Push every 6 hours PRN For aPTT between 40 - 57      Allergies    No Known Allergies        Vital Signs Last 24 Hrs  T(C): 36.6, Max: 36.6 (06-14 @ 05:42)  T(F): 97.8, Max: 97.8 (06-14 @ 05:42)  HR: 59 (58 - 62)  BP: 114/55 (103/68 - 133/65)  BP(mean): --  RR: 18 (18 - 19)  SpO2: 95% (95% - 97%)    I&O's Summary  I & Os for 24h ending 13 Jun 2017 07:00  =============================================  IN: 1400 ml / OUT: 2400 ml / NET: -1000 ml    I & Os for current day (as of 14 Jun 2017 06:37)  =============================================  IN: 1780 ml / OUT: 2320 ml / NET: -540 ml      ON EXAM:  General: NAD, awake and alert, oriented x 3  HEENT: Mucous membranes are moist, anicteric  Lungs: Non-labored, breath sounds are clear bilaterally, No wheezing, rales or rhonchi.  Decreased breath sounds bilaterally  Cardiovascular: Regular, S1 and S2, no murmurs, rubs, or gallops. Distant heart sounds  Gastrointestinal: Bowel Sounds present, soft, nontender.   Obese  Lymph: 2+ peripheral edema. No lymphadenopathy.  Skin: b/l LE venous stasis changes          LABS: All Labs Reviewed:                        11.2   6.37  )-----------( 226      ( 13 Jun 2017 07:54 )             35.0                         11.0   6.55  )-----------( 224      ( 12 Jun 2017 08:36 )             34.4                         11.1   6.67  )-----------( 205      ( 11 Jun 2017 08:26 )             33.7     13 Jun 2017 17:39    137    |  100    |  16     ----------------------------<  132    4.5     |  27     |  1.12   13 Jun 2017 07:54    136    |  98     |  14     ----------------------------<  150    4.6     |  22     |  1.05   12 Jun 2017 08:36    138    |  101    |  16     ----------------------------<  109    4.3     |  21     |  1.05     Ca    8.9        13 Jun 2017 17:39  Ca    8.6        13 Jun 2017 07:54  Ca    8.7        12 Jun 2017 08:36  Mg     2.1       13 Jun 2017 15:13      PT/INR - ( 13 Jun 2017 07:43 )   PT: 12.8 sec;   INR: 1.13 ratio         PTT - ( 13 Jun 2017 07:43 )  PTT:88.9 sec      Assessment/Plan:    67 y.o. male history of a CVA with left hemiparesis, DVT's in the past with PE's, morbid obesity, sz d/o, expressive aphasia who presents with acute DVT having had warfarin dc 2w ago, and with pancreatic mass s/p EUS with biopsy, results consistent with pancreatic cyst, now with 32 beats WCT, atypical chest pain, normal LV function:    -patient's chest pain is very atypical.  EKG without ischemic changes.  Pain has resolved at this point. I do not feel that the patient is a candidate for cardiac cath, and would therefore defer stress testing (patient would have a high likelihood of a suboptimal study based on body habitus and co-morbidites), and manage him medically either way.  -agree with starting metoprolol 12.5 PO BID  -monitor HR and BP  -EP attending follow up  -edema with venous stasis, can cont po lasix  --heparin gtt to be continued with goal PTT 60-90.  Dose Coumadin. Goal INR 2-3.   -monitor and replete electrolytes  -supp oxygen as needed  - All other workup per primary team. Will followup.

## 2017-06-14 NOTE — PROGRESS NOTE ADULT - ASSESSMENT
patient on cipro 5 days s/p panc cyst aspiration.  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 increased size typically would go to or, but increased risk will follow.  patient will need tripple therapy for h.pylori as outpatient.  would not rx now on cipro for cyst.

## 2017-06-14 NOTE — PHYSICAL THERAPY INITIAL EVALUATION ADULT - ACTIVE RANGE OF MOTION EXAMINATION, REHAB EVAL
no Active ROM deficits were identified/L side WNL, R UE no active movement, R LE pt able to initiate heel slide, min hip/knee ext  no other active movement observed

## 2017-06-14 NOTE — PHYSICAL THERAPY INITIAL EVALUATION ADULT - ADDITIONAL COMMENTS
pt has a 24hr private HHA.  pt has a hospital bed (malfunctioning), 24hr HHA, motorized w/c. riley walker. pt at baseline is able to transfer to and from the w/c with assist

## 2017-06-14 NOTE — PROGRESS NOTE ADULT - SUBJECTIVE AND OBJECTIVE BOX
cc : worsening lower ext pain     SUBJECTIVE / OVERNIGHT EVENTS: denies chest pain, shortness of breath, nausea,v , weakness in lower ext        MEDICATIONS  (STANDING):  lisinopril 10milliGRAM(s) Oral daily  carBAMazepine XR Tablet 400milliGRAM(s) Oral two times a day  levETIRAcetam 500milliGRAM(s) Oral two times a day  atorvastatin 80milliGRAM(s) Oral at bedtime  furosemide    Tablet 40milliGRAM(s) Oral daily  heparin  Infusion. 1700Unit(s)/Hr IV Continuous <Continuous>  sodium chloride 0.9% lock flush 3milliLiter(s) IV Push every 8 hours  metoprolol 12.5milliGRAM(s) Oral two times a day    MEDICATIONS  (PRN):  heparin  Injectable 37793Sdgj(s) IV Push every 6 hours PRN For aPTT less than 40  heparin  Injectable 5000Unit(s) IV Push every 6 hours PRN For aPTT between 40 - 57  Vital Signs Last 24 Hrs  T(C): 36.6, Max: 36.6 (06-14 @ 05:42)  T(F): 97.9, Max: 97.9 (06-14 @ 12:22)  HR: 70 (59 - 70)  BP: 123/73 (114/55 - 133/65)  BP(mean): --  RR: 19 (18 - 19)  SpO2: 98% (95% - 98%)Constitutional: No fever, fatigue or weight loss.  Skin: No rash.  Eyes: No recent vision problems or eye pain.  ENT: No congestion, ear pain, or sore throat.  Endocrine: No thyroid problems.  Cardiovascular: No chest pain or palpation.  Respiratory: No cough, shortness of breath, congestion, or wheezing.  Gastrointestinal: No abdominal pain, nausea, vomiting, or diarrhea.  Genitourinary: No dysuria.  Musculoskeletal: No joint swelling.  Neurologic: No headache.    PHYSICAL EXAM:  GENERAL: morbidly obese    EYES: EOMI, PERRLA, conjunctiva and sclera clear  NECK: Supple, No JVD  CHEST/LUNG: dec breath sounds at bases  HEART: Regular rate and rhythm;   ABDOMEN: Soft, Nontender, mild distenson+; Bowel sounds present  EXTREMITIES:  2+ Peripheral Pulses, No clubbing, cyanosis, or edema  Neuro : alert, awake calm , coopertive  SKIN: erythematous  rodolfo lower ext , chronic venous stasis changes +, fungal toe nails, dry scaly skin over rt foot            LABS:  06-13    137  |  100  |  16  ----------------------------<  132<H>  4.5   |  27  |  1.12    Ca    8.9      13 Jun 2017 17:39  Mg     2.1     06-13      Creatinine Trend: 1.12 <--, 1.05 <--, 1.05 <--, 1.02 <--, 0.81 <--                        11.5   7.04  )-----------( 209      ( 14 Jun 2017 07:29 )             34.5     Urine Studies:              PT/INR - ( 14 Jun 2017 10:02 )   PT: 15.8 sec;   INR: 1.39 ratio         PTT - ( 14 Jun 2017 07:29 )  PTT:78.9 sec

## 2017-06-14 NOTE — PROGRESS NOTE ADULT - SUBJECTIVE AND OBJECTIVE BOX
CYNTHIA EDMONDZEL:[id number],   67yMale followed for: pancreatic lesion, h.pylori  No Known Allergies    PAST MEDICAL & SURGICAL HISTORY:  Pulmonary embolism  Obesity  Seizure disorder  Hemiplegia Affecting Dominant Side  Aphasia: Expressive  Hypercholesteremia  CVA (Cerebral Vascular Accident)  Pulmonary embolism: wyatt filter  S/P cataract extraction and insertion of intraocular lens, right: 9/16/2010  No Past Surgical History    FAMILY HISTORY:  No pertinent family history in first degree relatives    MEDICATIONS  (STANDING):  lisinopril 10milliGRAM(s) Oral daily  carBAMazepine XR Tablet 400milliGRAM(s) Oral two times a day  levETIRAcetam 500milliGRAM(s) Oral two times a day  atorvastatin 80milliGRAM(s) Oral at bedtime  furosemide    Tablet 40milliGRAM(s) Oral daily  heparin  Infusion. 1700Unit(s)/Hr IV Continuous <Continuous>  sodium chloride 0.9% lock flush 3milliLiter(s) IV Push every 8 hours  metoprolol 12.5milliGRAM(s) Oral two times a day    MEDICATIONS  (PRN):  heparin  Injectable 64088Urgq(s) IV Push every 6 hours PRN For aPTT less than 40  heparin  Injectable 5000Unit(s) IV Push every 6 hours PRN For aPTT between 40 - 57      Vital Signs Last 24 Hrs  T(C): 36.6, Max: 36.6 (06-14 @ 05:42)  T(F): 97.8, Max: 97.8 (06-14 @ 05:42)  HR: 59 (58 - 62)  BP: 114/55 (103/68 - 133/65)  BP(mean): --  RR: 18 (18 - 19)  SpO2: 95% (95% - 97%)  nc/at  s1s2  cta  soft, nt, nd no guarding or rebound  no c/c/e    CBC Full  -  ( 14 Jun 2017 07:29 )  WBC Count : 7.04 K/uL  Hemoglobin : 11.5 g/dL  Hematocrit : 34.5 %  Platelet Count - Automated : 209 K/uL  Mean Cell Volume : 97.2 fl  Mean Cell Hemoglobin : 32.4 pg  Mean Cell Hemoglobin Concentration : 33.3 gm/dL  Auto Neutrophil # : x  Auto Lymphocyte # : x  Auto Monocyte # : x  Auto Eosinophil # : x  Auto Basophil # : x  Auto Neutrophil % : x  Auto Lymphocyte % : x  Auto Monocyte % : x  Auto Eosinophil % : x  Auto Basophil % : x    06-13    137  |  100  |  16  ----------------------------<  132<H>  4.5   |  27  |  1.12    Ca    8.9      13 Jun 2017 17:39  Mg     2.1     06-13      PT/INR - ( 13 Jun 2017 07:43 )   PT: 12.8 sec;   INR: 1.13 ratio         PTT - ( 14 Jun 2017 07:29 )  PTT:78.9 sec

## 2017-06-14 NOTE — PROGRESS NOTE ADULT - SUBJECTIVE AND OBJECTIVE BOX
Follow-up Pulm Progress Note    No new respiratory events overnight.  Denies SOB/CP.   Episode of VTach on tele yesterday    Medications:  MEDICATIONS  (STANDING):  lisinopril 10milliGRAM(s) Oral daily  carBAMazepine XR Tablet 400milliGRAM(s) Oral two times a day  levETIRAcetam 500milliGRAM(s) Oral two times a day  atorvastatin 80milliGRAM(s) Oral at bedtime  furosemide    Tablet 40milliGRAM(s) Oral daily  heparin  Infusion. 1700Unit(s)/Hr IV Continuous <Continuous>  sodium chloride 0.9% lock flush 3milliLiter(s) IV Push every 8 hours  metoprolol 12.5milliGRAM(s) Oral two times a day    MEDICATIONS  (PRN):  heparin  Injectable 94616Cvac(s) IV Push every 6 hours PRN For aPTT less than 40  heparin  Injectable 5000Unit(s) IV Push every 6 hours PRN For aPTT between 40 - 57        Vital Signs Last 24 Hrs  T(C): 36.6, Max: 36.6 (06-14 @ 05:42)  T(F): 97.8, Max: 97.8 (06-14 @ 05:42)  HR: 59 (58 - 62)  BP: 114/55 (103/68 - 133/65)  BP(mean): --  RR: 18 (18 - 19)  SpO2: 95% (95% - 97%)              LABS:                        11.5   7.04  )-----------( 209      ( 14 Jun 2017 07:29 )             34.5     06-13    137  |  100  |  16  ----------------------------<  132<H>  4.5   |  27  |  1.12    Ca    8.9      13 Jun 2017 17:39  Mg     2.1     06-13            CAPILLARY BLOOD GLUCOSE    PT/INR - ( 14 Jun 2017 10:02 )   PT: 15.8 sec;   INR: 1.39 ratio         PTT - ( 14 Jun 2017 07:29 )  PTT:78.9 sec        Physical Examination:  PULM: Clear to auscultation bilaterally, no significant sputum production  CVS: Regular rate and rhythm, no murmurs, rubs, or gallops    RADIOLOGY REVIEWED Follow-up Pulm Progress Note    No new respiratory events overnight.  Denies SOB/CP.   Episode of VTach on tele yesterday    Medications:  MEDICATIONS  (STANDING):  lisinopril 10milliGRAM(s) Oral daily  carBAMazepine XR Tablet 400milliGRAM(s) Oral two times a day  levETIRAcetam 500milliGRAM(s) Oral two times a day  atorvastatin 80milliGRAM(s) Oral at bedtime  furosemide    Tablet 40milliGRAM(s) Oral daily  heparin  Infusion. 1700Unit(s)/Hr IV Continuous <Continuous>  sodium chloride 0.9% lock flush 3milliLiter(s) IV Push every 8 hours  metoprolol 12.5milliGRAM(s) Oral two times a day    MEDICATIONS  (PRN):  heparin  Injectable 97532Nzel(s) IV Push every 6 hours PRN For aPTT less than 40  heparin  Injectable 5000Unit(s) IV Push every 6 hours PRN For aPTT between 40 - 57        Vital Signs Last 24 Hrs  T(C): 36.6, Max: 36.6 (06-14 @ 05:42)  T(F): 97.8, Max: 97.8 (06-14 @ 05:42)  HR: 59 (58 - 62)  BP: 114/55 (103/68 - 133/65)  BP(mean): --  RR: 18 (18 - 19)  SpO2: 95% (95% - 97%) on RA              LABS:                        11.5   7.04  )-----------( 209      ( 14 Jun 2017 07:29 )             34.5     06-13    137  |  100  |  16  ----------------------------<  132<H>  4.5   |  27  |  1.12    Ca    8.9      13 Jun 2017 17:39  Mg     2.1     06-13            CAPILLARY BLOOD GLUCOSE    PT/INR - ( 14 Jun 2017 10:02 )   PT: 15.8 sec;   INR: 1.39 ratio         PTT - ( 14 Jun 2017 07:29 )  PTT:78.9 sec        Physical Examination:  PULM: Decreased BS throughout  CVS: Regular rate and rhythm, no murmurs, rubs, or gallops    RADIOLOGY REVIEWED

## 2017-06-14 NOTE — PROVIDER CONTACT NOTE (OTHER) - ACTION/TREATMENT ORDERED:
IV taken out, R arm elevated and with warm pack, color and swelling going down, arm was assessed by NP Zeta

## 2017-06-14 NOTE — PROGRESS NOTE ADULT - SUBJECTIVE AND OBJECTIVE BOX
Vascular Medicine Inpatient Progress Note    Asked by Dr. Hamm to evaluate patient with acute DVT.    Remains hemodynamically stable.  Tolerating anticoagulation.  Started on warfarin.  INR goal 2-3.  INR 1.13 yesterday, today still pending.    EXAM:  CT ANGIO CHEST (W)AW IC                            PROCEDURE DATE:  06/07/2017        INTERPRETATION:  CLINICAL INFORMATION: Palpitations and leg pain. History   of PE recently taken off of anticoagulation.    COMPARISON: CT chest February 5, 2010     PROCEDURE:   CTA of the Chest was performed withintravenous contrast.  90 ml of Omnipaque 350 was injected intravenously. 10 ml were discarded.  Sagittal and coronal reformats were performed as well as 3D   Reconstructions.    FINDINGS:    CHEST:     LUNGS AND LARGE AIRWAYS: Patent central airways. Right upper lobe nodule   (8:58) measures 7 mm, unchanged from the prior study.  PLEURA: No pleural effusion.  VESSELS: There is no central, lobar, or segmental pulmonary embolus.   Evaluation of the subsegmental branches is limited secondary to body   habitus and motion artifact. Atherosclerotic calcifications of the aorta.  HEART: Heart size is normal.No pericardial effusion.  MEDIASTINUM AND WALTER: No lymphadenopathy.  CHEST WALL AND LOWER NECK: Within normal limits.  VISUALIZED UPPER ABDOMEN:Fatty atrophy of the pancreas. Indeterminant an   ill-defined soft tissue density pancreatic body lesion measuring 2.5 x   2.9 x 3.3 cm with interval increase in size since February 5, 2010   measured about 2 x 1.5 cm.  BONES: Within normal limits.    IMPRESSION:   1.  No central, lobar, or segmental pulmonary embolus. Evaluation of the   subsegmental branches is limited secondary to body habitus and motion   artifact.  2.  Soft tissue density pancreatic body lesion measuring 2.5 x 2.9 x 3.3   cmwith interval increase in size since February 5, 2010 worrisome for   neoplastic etiology. GI consultation is recommended.      ***Please see the addendum at the top of this report. It may contain   additional important information or changes.****      LINDA MUNOZ M.D., RADIOLOGY RESIDENT  This document has been electronically signed.  ANUSHKA WINTERS M.D. ATTENDING RADIOLOGIST  This document has been electronically signed. Jun 7 2017  6:58PM  Addend:  ANUSHKA WINTERS M.D. ATTENDING RADIOLOGIST  This addendum was electronically signed on: Jun 7 2017  8:14PM.            EXAM:  DUPLEX SCAN EXT VEINS LOWER BI                            PROCEDURE DATE:  06/07/2017        INTERPRETATION:  History:On a prior examination dated 3/2/2011, a short   segment of nonocclusive thrombus was identified in the right common   femoral vein, IVC filter placed, recently anticoagulation medicines had   been halted, left lower extremity swelling, rule out DVT.    There is edema within the superficial soft tissues of the left lower   extremity.    On the right, there remains intraluminal filling defects similar in   appearance to those 6 years earlier, representing the residue of prior   DVT.    There is acute DVT, almost occlusive to flow, affecting the left femoral   and common femoral veins.    The left greater saphenous vein, a superficial vein, is also thrombosed.    The calf veins were not diagnostically imaged.    IMPRESSION: There is acute almost occlusive DVT affecting the left   femoral and common femoral veins.  Superficial thrombophlebitis affects the left greater saphenous vein.    MEDICATIONS  (STANDING):  lisinopril 10milliGRAM(s) Oral daily  carBAMazepine XR Tablet 400milliGRAM(s) Oral two times a day  levETIRAcetam 500milliGRAM(s) Oral two times a day  atorvastatin 80milliGRAM(s) Oral at bedtime  furosemide    Tablet 40milliGRAM(s) Oral daily  heparin  Infusion. 1700Unit(s)/Hr IV Continuous <Continuous>  sodium chloride 0.9% lock flush 3milliLiter(s) IV Push every 8 hours  metoprolol 12.5milliGRAM(s) Oral two times a day    MEDICATIONS  (PRN):  heparin  Injectable 91637Xzhj(s) IV Push every 6 hours PRN For aPTT less than 40  heparin  Injectable 5000Unit(s) IV Push every 6 hours PRN For aPTT between 40 - 57      PHYSICAL EXAM:  Vital Signs Last 24 Hrs  T(C): 36.6, Max: 36.6 (06-14 @ 05:42)  T(F): 97.8, Max: 97.8 (06-14 @ 05:42)  HR: 59 (58 - 62)  BP: 114/55 (103/68 - 133/65)  BP(mean): --  RR: 18 (18 - 19)  SpO2: 95% (95% - 97%)    Appearance: with morbid obesity, appears chronically ill  HEENT:   Normal oral mucosa, PERRL, EOMI	  Lymphatic: No obvious lymphadenopathy  Cardiovascular: Normal S1 S2, No JVD, No murmurs,  Respiratory: Decreased BS anteriorly, poor inspiratory effort	  Psychiatry: A & O x 3, Mood & affect appropriate  Gastrointestinal:  Obese, otherwise soft, Non-tender, + BS	  Skin: +chronic venous stasis changes LLE>RLE, skin discoloration	  Neurologic: +B/L weakness  Extremities: Edema as noted above.      LABS:	 	    Today's lab results still pending                               11.2   6.37  )-----------( 226      ( 13 Jun 2017 07:54 )             35.0     06-13    137  |  100  |  16  ----------------------------<  132<H>  4.5   |  27  |  1.12    Ca    8.9      13 Jun 2017 17:39  Mg     2.1     06-13      PT/INR - ( 13 Jun 2017 07:43 )   PT: 12.8 sec;   INR: 1.13 ratio         PTT - ( 13 Jun 2017 07:43 )  PTT:88.9 sec        Patient name: CYNTHIA TAPIA  YOB: 1950   Age: 67 (M)   MR#: [id number]  Study Date: 6/8/2017  Location: [address] [place], [place] [id number]: Alyson Emerson RDCS  Study quality: Technically difficult  Referring Physician: Bipin Hamm MD  Blood Pressure: 114/72 mmHg  Height: 180 cm  Weight: 192 kg  BSA: 2.9 m2  ------------------------------------------------------------------------  PROCEDURE: Transthoracic echocardiogram with 2-D, M-Mode  and complete spectral and color flow Doppler. Verbal  consent was obtained for injection of echo contrast  following a discussion of risks and benefits. Following  intravenous injection of contrast, harmonic imaging was  performed.  INDICATION: Chest pain, unspecified (R07.9)  ------------------------------------------------------------------------  Dimensions:    Normal Values:  LA:            2.0 - 4.0 cm  Ao:     3.4    2.0 - 3.8 cm  SEPTUM: 1.1    0.6 - 1.2 cm  PWT:    1.2    0.6 - 1.1 cm  LVIDd:  5.5    3.0 - 5.6 cm  LVIDs:  3.2    1.8 - 4.0 cm  Derived variables:  LVMI: 89 g/m2  RWT: 0.43  Fractional short: 42 %  EF (Teicholtz): 72 %  Doppler Peak Velocity (m/sec): AoV=2.0  ------------------------------------------------------------------------  Observations:  Mitral Valve: Mitral annular calcification, otherwise  normal mitral valve. Minimal mitral regurgitation.  Aortic Valve/Aorta: Aortic valve not well visualized;  appears calcified. No aortic valve regurgitation seen. Peak  left ventricular outflow tract gradient equals 3 mm Hg,  mean gradient is equal to 1 mm Hg, LVOT velocity time  integral equals 16 cm.  Aortic Root: 3.4 cm.  LVOT diameter: 2.2 cm.  Left Atrium: Normal left atrium.  Left Ventricle: Endocardial visualization enhanced with  intravenous injection of echo contrast (Definity).  Endocardium not well visualized despite the use of echo  contrast. Grossly normal LV function. Suboptimal images  precludes accurage evaluation for regional wall motion  abnormalities. Normal left ventricular internal dimensions  and wall thicknesses.  Right Heart: Normal right atrium. The right ventricle is  not well visualized; grossly normal right ventricular  systolic function. Normal tricuspid valve. Minimal  tricuspid regurgitation. Pulmonic valve not well  visualized.  Pericardium/Pleura: Normal pericardium with no pericardial  effusion.  Hemodynamic: Estimated right atrial pressure is 8 mm Hg.  Estimated right ventricular systolic pressure equals 28 mm  Hg, assuming right atrial pressure equals 8 mm Hg,  consistent with normal pulmonary pressures.  ------------------------------------------------------------------------  Conclusions:  1. Normal left ventricular internal dimensions and wall  thicknesses.  2. Endocardial visualization enhanced with intravenous  injection of echo contrast (Definity). Endocardium not well  visualized despite the use of echo contrast. Grossly normal  LV function. Suboptimal images precludes accurage  evaluation for regional wall motion abnormalities.  3. The right ventricle is notwell visualized; grossly  normal right ventricular systolic function.  *** No previous Echo exam.  ------------------------------------------------------------------------  Confirmed on  6/8/2017 - 13:05:14 by Max Soliz M.D.  ------------------------------------------------------------------------      Provoked VTE -- on anticoagulation with heparin.  Hemodynamically stable, no suggestion of respiratory distress.    --Agree with heparin gtt-->warfarin.  INR goal 2-3.  Duration of therapy may need to be extended/indefinite as long as benefits outweigh risks.  --Do not recommend IVC filter as patient is likely hypercoagulable with underlying neoplasm.  --Wound care evaluation.  --Will follow with you--thank you.

## 2017-06-14 NOTE — PROVIDER CONTACT NOTE (OTHER) - ASSESSMENT
pt's right forearm around IV site noticed pinkish, swollen, warm, radial pulses 2+; was getting ciprofloxacin in it early am, stated previous episode happened yesterday evening on the other arm; no other distress;

## 2017-06-15 LAB
APTT BLD: 96.2 SEC — HIGH (ref 27.5–37.4)
HCT VFR BLD CALC: 37.5 % — LOW (ref 39–50)
HGB BLD-MCNC: 12.4 G/DL — LOW (ref 13–17)
INR BLD: 1.53 RATIO — HIGH (ref 0.88–1.16)
MCHC RBC-ENTMCNC: 32.8 PG — SIGNIFICANT CHANGE UP (ref 27–34)
MCHC RBC-ENTMCNC: 33.1 GM/DL — SIGNIFICANT CHANGE UP (ref 32–36)
MCV RBC AUTO: 99.2 FL — SIGNIFICANT CHANGE UP (ref 80–100)
PLATELET # BLD AUTO: 221 K/UL — SIGNIFICANT CHANGE UP (ref 150–400)
PROTHROM AB SERPL-ACNC: 16.7 SEC — HIGH (ref 9.8–12.7)
RBC # BLD: 3.78 M/UL — LOW (ref 4.2–5.8)
RBC # FLD: 11.9 % — SIGNIFICANT CHANGE UP (ref 10.3–14.5)
WBC # BLD: 7.1 K/UL — SIGNIFICANT CHANGE UP (ref 3.8–10.5)
WBC # FLD AUTO: 7.1 K/UL — SIGNIFICANT CHANGE UP (ref 3.8–10.5)

## 2017-06-15 PROCEDURE — 99233 SBSQ HOSP IP/OBS HIGH 50: CPT

## 2017-06-15 RX ORDER — WARFARIN SODIUM 2.5 MG/1
10 TABLET ORAL ONCE
Qty: 0 | Refills: 0 | Status: COMPLETED | OUTPATIENT
Start: 2017-06-15 | End: 2017-06-15

## 2017-06-15 RX ADMIN — LISINOPRIL 10 MILLIGRAM(S): 2.5 TABLET ORAL at 05:41

## 2017-06-15 RX ADMIN — Medication 400 MILLIGRAM(S): at 18:31

## 2017-06-15 RX ADMIN — HEPARIN SODIUM 2000 UNIT(S)/HR: 5000 INJECTION INTRAVENOUS; SUBCUTANEOUS at 12:04

## 2017-06-15 RX ADMIN — Medication 40 MILLIGRAM(S): at 05:41

## 2017-06-15 RX ADMIN — ATORVASTATIN CALCIUM 80 MILLIGRAM(S): 80 TABLET, FILM COATED ORAL at 21:36

## 2017-06-15 RX ADMIN — LEVETIRACETAM 500 MILLIGRAM(S): 250 TABLET, FILM COATED ORAL at 18:31

## 2017-06-15 RX ADMIN — SODIUM CHLORIDE 3 MILLILITER(S): 9 INJECTION INTRAMUSCULAR; INTRAVENOUS; SUBCUTANEOUS at 05:41

## 2017-06-15 RX ADMIN — SODIUM CHLORIDE 3 MILLILITER(S): 9 INJECTION INTRAMUSCULAR; INTRAVENOUS; SUBCUTANEOUS at 21:32

## 2017-06-15 RX ADMIN — Medication 12.5 MILLIGRAM(S): at 18:31

## 2017-06-15 RX ADMIN — WARFARIN SODIUM 10 MILLIGRAM(S): 2.5 TABLET ORAL at 21:36

## 2017-06-15 RX ADMIN — Medication 400 MILLIGRAM(S): at 05:41

## 2017-06-15 RX ADMIN — LEVETIRACETAM 500 MILLIGRAM(S): 250 TABLET, FILM COATED ORAL at 05:41

## 2017-06-15 RX ADMIN — Medication 12.5 MILLIGRAM(S): at 05:41

## 2017-06-15 RX ADMIN — SODIUM CHLORIDE 3 MILLILITER(S): 9 INJECTION INTRAMUSCULAR; INTRAVENOUS; SUBCUTANEOUS at 14:00

## 2017-06-15 NOTE — PROGRESS NOTE ADULT - ASSESSMENT
68 y/o M with history of provoked PE-DVT 2nd tobacco use and immobility s/p Somers filter (90s), CVA with residual R hemiplegia (2001), seizure history, primarily bedbound now. Admitted with increased LE pain after recently stopping coumadin. Found to have acute on chronic DVT. No evidence of PE. Incidentally noted pancreatic lesion being recommended for EUS. Called for pre-procedural assessment. Tolerated EUS w/o incident. 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 Episode of Vtach-no further w/u indicated

## 2017-06-15 NOTE — PROGRESS NOTE ADULT - SUBJECTIVE AND OBJECTIVE BOX
Follow-up Pulm Progress Note    No new respiratory events overnight.  Denies SOB/CP.     Medications:  MEDICATIONS  (STANDING):  lisinopril 10milliGRAM(s) Oral daily  carBAMazepine XR Tablet 400milliGRAM(s) Oral two times a day  levETIRAcetam 500milliGRAM(s) Oral two times a day  atorvastatin 80milliGRAM(s) Oral at bedtime  furosemide    Tablet 40milliGRAM(s) Oral daily  heparin  Infusion. 1700Unit(s)/Hr IV Continuous <Continuous>  sodium chloride 0.9% lock flush 3milliLiter(s) IV Push every 8 hours  metoprolol 12.5milliGRAM(s) Oral two times a day    MEDICATIONS  (PRN):  heparin  Injectable 12250Ejxw(s) IV Push every 6 hours PRN For aPTT less than 40  heparin  Injectable 5000Unit(s) IV Push every 6 hours PRN For aPTT between 40 - 57      Vent settings (if applicable)      Vital Signs Last 24 Hrs  T(C): 36.4, Max: 36.7 (06-15 @ 04:45)  T(F): 97.5, Max: 98 (06-15 @ 04:45)  HR: 60 (60 - 70)  BP: 102/64 (102/64 - 123/73)  BP(mean): --  RR: 18 (18 - 18)  SpO2: 100% (96% - 100%) on RA        I & Os for 24h ending 06-15 @ 07:00  =============================================  IN: 1560 ml / OUT: 1950 ml / NET: -390 ml        LABS:                        12.4   7.1   )-----------( 221      ( 15 Trevon 2017 08:27 )             37.5     06-13    137  |  100  |  16  ----------------------------<  132<H>  4.5   |  27  |  1.12    Ca    8.9      13 Jun 2017 17:39  Mg     2.1     06-13            CAPILLARY BLOOD GLUCOSE    PT/INR - ( 15 Trevon 2017 08:26 )   PT: 16.7 sec;   INR: 1.53 ratio         PTT - ( 15 Trevon 2017 08:26 )  PTT:96.2 sec          CULTURES:        Physical Examination:  PULM: Decreased BS throughout  CVS: Regular rate and rhythm, no murmurs, rubs, or gallops    RADIOLOGY REVIEWED

## 2017-06-15 NOTE — PROGRESS NOTE ADULT - ASSESSMENT
mucinous cystadeonma, need ct in six months, will need rx h.pylori  amoxicillin 1g bid, clarithromycin 500 bid and omeprazole bid

## 2017-06-15 NOTE — PROGRESS NOTE ADULT - ASSESSMENT
67 y.o. male history of a CVA with left hemiparesis, DVT's in the past with PE's, morbid obesity, sz d/o, expressive aphasia who presents with acute DVT having had warfarin dc 2w ago, and with pancreatic mass s/p EUS with biopsy, results consistent with pancreatic cyst, 32 beats WCT, atypical chest pain, normal LV function:    -no further wct  -no further cp. had been atypical and without ischemic changes on ekg.  As he is not a candidate for cardiac cath, for further ischemic testing is recommended at this point  -cont metoprolol 12.5 PO BID  -monitor HR and BP  -edema with venous stasis, can cont po lasix  --heparin gtt to be continued with goal PTT 60-90.  Dose Coumadin. Goal INR 2-3.   -monitor and replete electrolytes  -supp oxygen as needed  - All other workup per primary team. Will followup.

## 2017-06-15 NOTE — PROGRESS NOTE ADULT - SUBJECTIVE AND OBJECTIVE BOX
cc : worsening lower ext pain     SUBJECTIVE / OVERNIGHT EVENTS: denies chest pain, shortness of breath, nausea,v , weakness in lower ext          MEDICATIONS  (STANDING):  lisinopril 10milliGRAM(s) Oral daily  carBAMazepine XR Tablet 400milliGRAM(s) Oral two times a day  levETIRAcetam 500milliGRAM(s) Oral two times a day  atorvastatin 80milliGRAM(s) Oral at bedtime  furosemide    Tablet 40milliGRAM(s) Oral daily  heparin  Infusion. 1700Unit(s)/Hr IV Continuous <Continuous>  sodium chloride 0.9% lock flush 3milliLiter(s) IV Push every 8 hours  metoprolol 12.5milliGRAM(s) Oral two times a day    MEDICATIONS  (PRN):  heparin  Injectable 77690Oikt(s) IV Push every 6 hours PRN For aPTT less than 40  heparin  Injectable 5000Unit(s) IV Push every 6 hours PRN For aPTT between 40 - 57  Vital Signs Last 24 Hrs  T(C): 36.7, Max: 36.7 (06-15 @ 04:45)  T(F): 98, Max: 98 (06-15 @ 04:45)  HR: 66 (62 - 70)  BP: 117/63 (116/59 - 123/73)  BP(mean): --  RR: 18 (18 - 18)  SpO2: 96% (96% - 98%)    constitutional: No fever, fatigue or weight loss.  Skin: No rash.  Eyes: No recent vision problems or eye pain.  ENT: No congestion, ear pain, or sore throat.  Endocrine: No thyroid problems.  Cardiovascular: No chest pain or palpation.  Respiratory: No cough, shortness of breath, congestion, or wheezing.  Gastrointestinal: No abdominal pain, nausea, vomiting, or diarrhea.  Genitourinary: No dysuria.  Musculoskeletal: No joint swelling.  Neurologic: No headache.    PHYSICAL EXAM:  GENERAL: morbidly obese    EYES: EOMI, PERRLA, conjunctiva and sclera clear  NECK: Supple, No JVD  CHEST/LUNG: dec breath sounds at bases  HEART: Regular rate and rhythm;   ABDOMEN: Soft, Nontender, mild distenson+; Bowel sounds present  EXTREMITIES:  2+ Peripheral Pulses, No clubbing, cyanosis, or edema  Neuro : alert, awake calm , coopertive  SKIN: erythematous  rodolfo lower ext , chronic venous stasis changes +, fungal toe nails, dry scaly skin over rt foot            LABS:  06-13    137  |  100  |  16  ----------------------------<  132<H>  4.5   |  27  |  1.12    Ca    8.9      13 Jun 2017 17:39  Mg     2.1     06-13      Creatinine Trend: 1.12 <--, 1.05 <--, 1.05 <--, 1.02 <--                        12.4   7.1   )-----------( 221      ( 15 Trevon 2017 08:27 )             37.5     Urine Studies:              PT/INR - ( 15 Trevon 2017 08:26 )   PT: 16.7 sec;   INR: 1.53 ratio         PTT - ( 15 Trevon 2017 08:26 )  PTT:96.2 sec              PT/INR - ( 14 Jun 2017 10:02 )   PT: 15.8 sec;   INR: 1.39 ratio         PTT - ( 14 Jun 2017 07:29 )  PTT:78.9 sec

## 2017-06-15 NOTE — PROGRESS NOTE ADULT - SUBJECTIVE AND OBJECTIVE BOX
[facility]    Yan Loaiza, Jen Carroll, Tomy Azevedo      [phone]    CHIEF COMPLAINT: Patient is a 67y old  Male who presents with a chief complaint of worsening left lower ext pain (13 Jun 2017 13:51)      Follow Up: dvt, cva/hemipar, pancreatic cystadenoma    Interim history: The patient reports no new symptoms.  Denies chest discomfort and shortness of breath.  No abdominal pain.  No new neurologic symptoms.      MEDICATIONS  (STANDING):  lisinopril 10milliGRAM(s) Oral daily  carBAMazepine XR Tablet 400milliGRAM(s) Oral two times a day  levETIRAcetam 500milliGRAM(s) Oral two times a day  atorvastatin 80milliGRAM(s) Oral at bedtime  furosemide    Tablet 40milliGRAM(s) Oral daily  heparin  Infusion. 1700Unit(s)/Hr IV Continuous <Continuous>  sodium chloride 0.9% lock flush 3milliLiter(s) IV Push every 8 hours  metoprolol 12.5milliGRAM(s) Oral two times a day    MEDICATIONS  (PRN):  heparin  Injectable 54603Oovg(s) IV Push every 6 hours PRN For aPTT less than 40  heparin  Injectable 5000Unit(s) IV Push every 6 hours PRN For aPTT between 40 - 57      REVIEW OF SYSTEMS:  eye, ent, GI, , allergic, dermatologic, musculoskeletal and neurologic are negative except as described above    Vital Signs Last 24 Hrs  T(C): 36.7, Max: 36.7 (06-15 @ 04:45)  T(F): 98, Max: 98 (06-15 @ 04:45)  HR: 66 (62 - 70)  BP: 117/63 (116/59 - 123/73)  BP(mean): --  RR: 18 (18 - 19)  SpO2: 96% (96% - 98%)    I&O's Summary    I & Os for current day (as of 15 Trevon 2017 08:02)  =============================================  IN: 1560 ml / OUT: 1950 ml / NET: -390 ml      Telemetry past 24h: sr. no vt    PHYSICAL EXAM:    General: NAD, awake and alert, oriented x 3  HEENT: Mucous membranes are moist, anicteric  Lungs: Non-labored, breath sounds are clear bilaterally, No wheezing, rales or rhonchi.  Decreased breath sounds bilaterally  Cardiovascular: Regular, S1 and S2, no murmurs, rubs, or gallops. Distant heart sounds  Gastrointestinal: Bowel Sounds present, soft, nontender.   Obese  Lymph: 2+ peripheral edema. No lymphadenopathy.  Skin: b/l LE venous stasis changes    LABS: All Labs Reviewed:                        11.5   7.04  )-----------( 209      ( 14 Jun 2017 07:29 )             34.5                         11.2   6.37  )-----------( 226      ( 13 Jun 2017 07:54 )             35.0                         11.0   6.55  )-----------( 224      ( 12 Jun 2017 08:36 )             34.4     13 Jun 2017 17:39    137    |  100    |  16     ----------------------------<  132    4.5     |  27     |  1.12   13 Jun 2017 07:54    136    |  98     |  14     ----------------------------<  150    4.6     |  22     |  1.05   12 Jun 2017 08:36    138    |  101    |  16     ----------------------------<  109    4.3     |  21     |  1.05     Ca    8.9        13 Jun 2017 17:39  Ca    8.6        13 Jun 2017 07:54  Ca    8.7        12 Jun 2017 08:36  Mg     2.1       13 Jun 2017 15:13      PT/INR - ( 14 Jun 2017 10:02 )   PT: 15.8 sec;   INR: 1.39 ratio         PTT - ( 14 Jun 2017 07:29 )  PTT:78.9 sec      Blood Culture:         RADIOLOGY:    EKG:    Echo:    Patient name: CYNTHIA TAPIA  YOB: 1950   Age: 67 (M)   MR#: [id number]  Study Date: 6/8/2017  Location: [address] [place], [place] [id number]: Alyson Emerson RDCS  Study quality: Technically difficult  Referring Physician: Bipin Hamm MD  Blood Pressure: 114/72 mmHg  Height: 180 cm  Weight: 192 kg  BSA: 2.9 m2  ------------------------------------------------------------------------  PROCEDURE: Transthoracic echocardiogram with 2-D, M-Mode  and complete spectral and color flow Doppler. Verbal  consent was obtained for injection of echo contrast  following a discussion of risks and benefits. Following  intravenous injection of contrast, harmonic imaging was  performed.  INDICATION: Chest pain, unspecified (R07.9)  ------------------------------------------------------------------------  Dimensions:    Normal Values:  LA:            2.0 - 4.0 cm  Ao:     3.4    2.0 - 3.8 cm  SEPTUM: 1.1    0.6 - 1.2 cm  PWT:    1.2    0.6 - 1.1 cm  LVIDd:  5.5    3.0 - 5.6 cm  LVIDs:  3.2    1.8 - 4.0 cm  Derived variables:  LVMI: 89 g/m2  RWT: 0.43  Fractional short: 42 %  EF (Teicholtz): 72 %  Doppler Peak Velocity (m/sec): AoV=2.0  ------------------------------------------------------------------------  Observations:  Mitral Valve: Mitral annular calcification, otherwise  normal mitral valve. Minimal mitral regurgitation.  Aortic Valve/Aorta: Aortic valve not well visualized;  appears calcified. No aortic valve regurgitation seen. Peak  left ventricular outflow tract gradient equals 3 mm Hg,  mean gradient is equal to 1 mm Hg, LVOT velocity time  integral equals 16 cm.  Aortic Root: 3.4 cm.  LVOT diameter: 2.2 cm.  Left Atrium: Normal left atrium.  Left Ventricle: Endocardial visualization enhanced with  intravenous injection of echo contrast (Definity).  Endocardium not well visualized despite the use of echo  contrast. Grossly normal LV function. Suboptimal images  precludes accurage evaluation for regional wall motion  abnormalities. Normal left ventricular internal dimensions  and wall thicknesses.  Right Heart: Normal right atrium. The right ventricle is  not well visualized; grossly normal right ventricular  systolic function. Normal tricuspid valve. Minimal  tricuspid regurgitation. Pulmonic valve not well  visualized.  Pericardium/Pleura: Normal pericardium with no pericardial  effusion.  Hemodynamic: Estimated right atrial pressure is 8 mm Hg.  Estimated right ventricular systolic pressure equals 28 mm  Hg, assuming right atrial pressure equals 8 mm Hg,  consistent with normal pulmonary pressures.  ------------------------------------------------------------------------  Conclusions:  1. Normal left ventricular internal dimensions and wall  thicknesses.  2. Endocardial visualization enhanced with intravenous  injection of echo contrast (Definity). Endocardium not well  visualized despite the use of echo contrast. Grossly normal  LV function. Suboptimal images precludes accurage  evaluation for regional wall motion abnormalities.  3. The right ventricle is notwell visualized; grossly  normal right ventricular systolic function.  *** No previous Echo exam.  ------------------------------------------------------------------------  Confirmed on  6/8/2017 - 13:05:14 by Max Soliz M.D.  ------------------------------------------------------------------------

## 2017-06-15 NOTE — PROGRESS NOTE ADULT - SUBJECTIVE AND OBJECTIVE BOX
CYNTHIA EDMONDZEL:[id number],   67yMale followed for: pancreatic mass  No Known Allergies    PAST MEDICAL & SURGICAL HISTORY:  Pulmonary embolism  Obesity  Seizure disorder  Hemiplegia Affecting Dominant Side  Aphasia: Expressive  Hypercholesteremia  CVA (Cerebral Vascular Accident)  Pulmonary embolism: wyatt filter  S/P cataract extraction and insertion of intraocular lens, right: 9/16/2010  No Past Surgical History    FAMILY HISTORY:  No pertinent family history in first degree relatives    MEDICATIONS  (STANDING):  lisinopril 10milliGRAM(s) Oral daily  carBAMazepine XR Tablet 400milliGRAM(s) Oral two times a day  levETIRAcetam 500milliGRAM(s) Oral two times a day  atorvastatin 80milliGRAM(s) Oral at bedtime  furosemide    Tablet 40milliGRAM(s) Oral daily  heparin  Infusion. 1700Unit(s)/Hr IV Continuous <Continuous>  sodium chloride 0.9% lock flush 3milliLiter(s) IV Push every 8 hours  metoprolol 12.5milliGRAM(s) Oral two times a day    MEDICATIONS  (PRN):  heparin  Injectable 56913Wsqo(s) IV Push every 6 hours PRN For aPTT less than 40  heparin  Injectable 5000Unit(s) IV Push every 6 hours PRN For aPTT between 40 - 57      Vital Signs Last 24 Hrs  T(C): 36.7, Max: 36.7 (06-15 @ 04:45)  T(F): 98, Max: 98 (06-15 @ 04:45)  HR: 66 (62 - 70)  BP: 117/63 (116/59 - 123/73)  BP(mean): --  RR: 18 (18 - 19)  SpO2: 96% (96% - 98%)  nc/at  s1s2  cta  soft, nt, nd no guarding or rebound  no c/c/e    CBC Full  -  ( 15 Trevon 2017 08:27 )  WBC Count : 7.1 K/uL  Hemoglobin : 12.4 g/dL  Hematocrit : 37.5 %  Platelet Count - Automated : 221 K/uL  Mean Cell Volume : 99.2 fl  Mean Cell Hemoglobin : 32.8 pg  Mean Cell Hemoglobin Concentration : 33.1 gm/dL  Auto Neutrophil # : x  Auto Lymphocyte # : x  Auto Monocyte # : x  Auto Eosinophil # : x  Auto Basophil # : x  Auto Neutrophil % : x  Auto Lymphocyte % : x  Auto Monocyte % : x  Auto Eosinophil % : x  Auto Basophil % : x    06-13    137  |  100  |  16  ----------------------------<  132<H>  4.5   |  27  |  1.12    Ca    8.9      13 Jun 2017 17:39  Mg     2.1     06-13      PT/INR - ( 15 Trevon 2017 08:26 )   PT: 16.7 sec;   INR: 1.53 ratio         PTT - ( 15 Trevon 2017 08:26 )  PTT:96.2 sec

## 2017-06-15 NOTE — PROGRESS NOTE ADULT - SUBJECTIVE AND OBJECTIVE BOX
Vascular Medicine Inpatient Progress Note    Asked by Dr. Hamm to evaluate patient with acute DVT.    Remains hemodynamically stable.  Tolerating anticoagulation.  Started on warfarin.  INR goal 2-3.  INR: 1.39 yesterday.     EXAM:  CT ANGIO CHEST (W)AW IC                            PROCEDURE DATE:  06/07/2017        INTERPRETATION:  CLINICAL INFORMATION: Palpitations and leg pain. History   of PE recently taken off of anticoagulation.    COMPARISON: CT chest February 5, 2010     PROCEDURE:   CTA of the Chest was performed withintravenous contrast.  90 ml of Omnipaque 350 was injected intravenously. 10 ml were discarded.  Sagittal and coronal reformats were performed as well as 3D   Reconstructions.    FINDINGS:    CHEST:     LUNGS AND LARGE AIRWAYS: Patent central airways. Right upper lobe nodule   (8:58) measures 7 mm, unchanged from the prior study.  PLEURA: No pleural effusion.  VESSELS: There is no central, lobar, or segmental pulmonary embolus.   Evaluation of the subsegmental branches is limited secondary to body   habitus and motion artifact. Atherosclerotic calcifications of the aorta.  HEART: Heart size is normal.No pericardial effusion.  MEDIASTINUM AND WALTER: No lymphadenopathy.  CHEST WALL AND LOWER NECK: Within normal limits.  VISUALIZED UPPER ABDOMEN:Fatty atrophy of the pancreas. Indeterminant an   ill-defined soft tissue density pancreatic body lesion measuring 2.5 x   2.9 x 3.3 cm with interval increase in size since February 5, 2010   measured about 2 x 1.5 cm.  BONES: Within normal limits.    IMPRESSION:   1.  No central, lobar, or segmental pulmonary embolus. Evaluation of the   subsegmental branches is limited secondary to body habitus and motion   artifact.  2.  Soft tissue density pancreatic body lesion measuring 2.5 x 2.9 x 3.3   cmwith interval increase in size since February 5, 2010 worrisome for   neoplastic etiology. GI consultation is recommended.      ***Please see the addendum at the top of this report. It may contain   additional important information or changes.****      LINDA MUNOZ M.D., RADIOLOGY RESIDENT  This document has been electronically signed.  ANUSHKA WINTERS M.D. ATTENDING RADIOLOGIST  This document has been electronically signed. Jun 7 2017  6:58PM  Addend:  ANUSHKA WINTERS M.D. ATTENDING RADIOLOGIST  This addendum was electronically signed on: Jun 7 2017  8:14PM.            EXAM:  DUPLEX SCAN EXT VEINS LOWER BI                            PROCEDURE DATE:  06/07/2017        INTERPRETATION:  History:On a prior examination dated 3/2/2011, a short   segment of nonocclusive thrombus was identified in the right common   femoral vein, IVC filter placed, recently anticoagulation medicines had   been halted, left lower extremity swelling, rule out DVT.    There is edema within the superficial soft tissues of the left lower   extremity.    On the right, there remains intraluminal filling defects similar in   appearance to those 6 years earlier, representing the residue of prior   DVT.    There is acute DVT, almost occlusive to flow, affecting the left femoral   and common femoral veins.    The left greater saphenous vein, a superficial vein, is also thrombosed.    The calf veins were not diagnostically imaged.    IMPRESSION: There is acute almost occlusive DVT affecting the left   femoral and common femoral veins.  Superficial thrombophlebitis affects the left greater saphenous vein.    MEDICATIONS  (STANDING):  lisinopril 10milliGRAM(s) Oral daily  carBAMazepine XR Tablet 400milliGRAM(s) Oral two times a day  levETIRAcetam 500milliGRAM(s) Oral two times a day  atorvastatin 80milliGRAM(s) Oral at bedtime  furosemide    Tablet 40milliGRAM(s) Oral daily  heparin  Infusion. 1700Unit(s)/Hr IV Continuous <Continuous>  sodium chloride 0.9% lock flush 3milliLiter(s) IV Push every 8 hours  metoprolol 12.5milliGRAM(s) Oral two times a day    MEDICATIONS  (PRN):  heparin  Injectable 42686Kczb(s) IV Push every 6 hours PRN For aPTT less than 40  heparin  Injectable 5000Unit(s) IV Push every 6 hours PRN For aPTT between 40 - 57      PHYSICAL EXAM:  Vital Signs Last 24 Hrs  T(C): 36.6, Max: 36.6 (06-14 @ 05:42)  T(F): 97.8, Max: 97.8 (06-14 @ 05:42)  HR: 59 (58 - 62)  BP: 114/55 (103/68 - 133/65)  BP(mean): --  RR: 18 (18 - 19)  SpO2: 95% (95% - 97%)    Appearance: with morbid obesity, appears chronically ill  HEENT:   Normal oral mucosa, PERRL, EOMI	  Lymphatic: No obvious lymphadenopathy  Cardiovascular: Normal S1 S2, No JVD, No murmurs,  Respiratory: Decreased BS anteriorly, poor inspiratory effort	  Psychiatry: A & O x 3, Mood & affect appropriate  Gastrointestinal:  Obese, otherwise soft, Non-tender, + BS	  Skin: +chronic venous stasis changes LLE>RLE, skin discoloration	  Neurologic: +B/L weakness  Extremities: Edema as noted above.      LABS:	 	                          11.5   7.04  )-----------( 209      ( 14 Jun 2017 07:29 )             34.5     06-13    137  |  100  |  16  ----------------------------<  132<H>  4.5   |  27  |  1.12    Ca    8.9      13 Jun 2017 17:39  Mg     2.1     06-13      PT/INR - ( 14 Jun 2017 10:02 )   PT: 15.8 sec;   INR: 1.39 ratio         PTT - ( 14 Jun 2017 07:29 )  PTT:78.9 sec      Patient name: CYNTHIA TAPIA  YOB: 1950   Age: 67 (M)   MR#: [id number]  Study Date: 6/8/2017  Location: [address] [place], [place] [id number]: Alyson Emerson RDCS  Study quality: Technically difficult  Referring Physician: Bipin Hamm MD  Blood Pressure: 114/72 mmHg  Height: 180 cm  Weight: 192 kg  BSA: 2.9 m2  ------------------------------------------------------------------------  PROCEDURE: Transthoracic echocardiogram with 2-D, M-Mode  and complete spectral and color flow Doppler. Verbal  consent was obtained for injection of echo contrast  following a discussion of risks and benefits. Following  intravenous injection of contrast, harmonic imaging was  performed.  INDICATION: Chest pain, unspecified (R07.9)  ------------------------------------------------------------------------  Dimensions:    Normal Values:  LA:            2.0 - 4.0 cm  Ao:     3.4    2.0 - 3.8 cm  SEPTUM: 1.1    0.6 - 1.2 cm  PWT:    1.2    0.6 - 1.1 cm  LVIDd:  5.5    3.0 - 5.6 cm  LVIDs:  3.2    1.8 - 4.0 cm  Derived variables:  LVMI: 89 g/m2  RWT: 0.43  Fractional short: 42 %  EF (Teicholtz): 72 %  Doppler Peak Velocity (m/sec): AoV=2.0  ------------------------------------------------------------------------  Observations:  Mitral Valve: Mitral annular calcification, otherwise  normal mitral valve. Minimal mitral regurgitation.  Aortic Valve/Aorta: Aortic valve not well visualized;  appears calcified. No aortic valve regurgitation seen. Peak  left ventricular outflow tract gradient equals 3 mm Hg,  mean gradient is equal to 1 mm Hg, LVOT velocity time  integral equals 16 cm.  Aortic Root: 3.4 cm.  LVOT diameter: 2.2 cm.  Left Atrium: Normal left atrium.  Left Ventricle: Endocardial visualization enhanced with  intravenous injection of echo contrast (Definity).  Endocardium not well visualized despite the use of echo  contrast. Grossly normal LV function. Suboptimal images  precludes accurage evaluation for regional wall motion  abnormalities. Normal left ventricular internal dimensions  and wall thicknesses.  Right Heart: Normal right atrium. The right ventricle is  not well visualized; grossly normal right ventricular  systolic function. Normal tricuspid valve. Minimal  tricuspid regurgitation. Pulmonic valve not well  visualized.  Pericardium/Pleura: Normal pericardium with no pericardial  effusion.  Hemodynamic: Estimated right atrial pressure is 8 mm Hg.  Estimated right ventricular systolic pressure equals 28 mm  Hg, assuming right atrial pressure equals 8 mm Hg,  consistent with normal pulmonary pressures.  ------------------------------------------------------------------------  Conclusions:  1. Normal left ventricular internal dimensions and wall  thicknesses.  2. Endocardial visualization enhanced with intravenous  injection of echo contrast (Definity). Endocardium not well  visualized despite the use of echo contrast. Grossly normal  LV function. Suboptimal images precludes accurage  evaluation for regional wall motion abnormalities.  3. The right ventricle is notwell visualized; grossly  normal right ventricular systolic function.  *** No previous Echo exam.  ------------------------------------------------------------------------  Confirmed on  6/8/2017 - 13:05:14 by Max Soliz M.D.  ------------------------------------------------------------------------      Provoked VTE -- on anticoagulation with heparin.  Hemodynamically stable, no suggestion of respiratory distress.    --Agree with heparin gtt-->warfarin.  INR goal 2-3.  Duration of therapy may need to be extended/indefinite as long as benefits outweigh risks.  --Do not recommend IVC filter as patient is likely hypercoagulable with underlying neoplasm.  --Will follow with you--thank you.

## 2017-06-16 VITALS
TEMPERATURE: 98 F | DIASTOLIC BLOOD PRESSURE: 68 MMHG | OXYGEN SATURATION: 97 % | SYSTOLIC BLOOD PRESSURE: 149 MMHG | HEART RATE: 67 BPM | RESPIRATION RATE: 18 BRPM

## 2017-06-16 LAB
ANION GAP SERPL CALC-SCNC: 10 MMOL/L — SIGNIFICANT CHANGE UP (ref 5–17)
APTT BLD: > 200 SEC (ref 27.5–37.4)
BUN SERPL-MCNC: 16 MG/DL — SIGNIFICANT CHANGE UP (ref 7–23)
CALCIUM SERPL-MCNC: 8.9 MG/DL — SIGNIFICANT CHANGE UP (ref 8.4–10.5)
CHLORIDE SERPL-SCNC: 98 MMOL/L — SIGNIFICANT CHANGE UP (ref 96–108)
CO2 SERPL-SCNC: 28 MMOL/L — SIGNIFICANT CHANGE UP (ref 22–31)
CREAT SERPL-MCNC: 1.03 MG/DL — SIGNIFICANT CHANGE UP (ref 0.5–1.3)
GLUCOSE SERPL-MCNC: 144 MG/DL — HIGH (ref 70–99)
HCT VFR BLD CALC: 33.9 % — LOW (ref 39–50)
HGB BLD-MCNC: 11.2 G/DL — LOW (ref 13–17)
INR BLD: 1.97 RATIO — HIGH (ref 0.88–1.16)
MCHC RBC-ENTMCNC: 32.8 PG — SIGNIFICANT CHANGE UP (ref 27–34)
MCHC RBC-ENTMCNC: 33.1 GM/DL — SIGNIFICANT CHANGE UP (ref 32–36)
MCV RBC AUTO: 99.2 FL — SIGNIFICANT CHANGE UP (ref 80–100)
PLATELET # BLD AUTO: 194 K/UL — SIGNIFICANT CHANGE UP (ref 150–400)
POTASSIUM SERPL-MCNC: 3.9 MMOL/L — SIGNIFICANT CHANGE UP (ref 3.5–5.3)
POTASSIUM SERPL-SCNC: 3.9 MMOL/L — SIGNIFICANT CHANGE UP (ref 3.5–5.3)
PROTHROM AB SERPL-ACNC: 21.8 SEC — HIGH (ref 9.8–12.7)
RBC # BLD: 3.42 M/UL — LOW (ref 4.2–5.8)
RBC # FLD: 11.8 % — SIGNIFICANT CHANGE UP (ref 10.3–14.5)
SODIUM SERPL-SCNC: 136 MMOL/L — SIGNIFICANT CHANGE UP (ref 135–145)
WBC # BLD: 6.3 K/UL — SIGNIFICANT CHANGE UP (ref 3.8–10.5)
WBC # FLD AUTO: 6.3 K/UL — SIGNIFICANT CHANGE UP (ref 3.8–10.5)

## 2017-06-16 PROCEDURE — 88305 TISSUE EXAM BY PATHOLOGIST: CPT

## 2017-06-16 PROCEDURE — 82947 ASSAY GLUCOSE BLOOD QUANT: CPT

## 2017-06-16 PROCEDURE — 80053 COMPREHEN METABOLIC PANEL: CPT

## 2017-06-16 PROCEDURE — 99233 SBSQ HOSP IP/OBS HIGH 50: CPT

## 2017-06-16 PROCEDURE — 85027 COMPLETE CBC AUTOMATED: CPT

## 2017-06-16 PROCEDURE — 88312 SPECIAL STAINS GROUP 1: CPT

## 2017-06-16 PROCEDURE — 83605 ASSAY OF LACTIC ACID: CPT

## 2017-06-16 PROCEDURE — 86301 IMMUNOASSAY TUMOR CA 19-9: CPT

## 2017-06-16 PROCEDURE — 84484 ASSAY OF TROPONIN QUANT: CPT

## 2017-06-16 PROCEDURE — 71275 CT ANGIOGRAPHY CHEST: CPT

## 2017-06-16 PROCEDURE — 83880 ASSAY OF NATRIURETIC PEPTIDE: CPT

## 2017-06-16 PROCEDURE — 85610 PROTHROMBIN TIME: CPT

## 2017-06-16 PROCEDURE — 88173 CYTOPATH EVAL FNA REPORT: CPT

## 2017-06-16 PROCEDURE — 85014 HEMATOCRIT: CPT

## 2017-06-16 PROCEDURE — 93005 ELECTROCARDIOGRAM TRACING: CPT

## 2017-06-16 PROCEDURE — 84295 ASSAY OF SERUM SODIUM: CPT

## 2017-06-16 PROCEDURE — C8929: CPT

## 2017-06-16 PROCEDURE — 97163 PT EVAL HIGH COMPLEX 45 MIN: CPT

## 2017-06-16 PROCEDURE — 99285 EMERGENCY DEPT VISIT HI MDM: CPT | Mod: 25

## 2017-06-16 PROCEDURE — 82803 BLOOD GASES ANY COMBINATION: CPT

## 2017-06-16 PROCEDURE — 82378 CARCINOEMBRYONIC ANTIGEN: CPT

## 2017-06-16 PROCEDURE — 74018 RADEX ABDOMEN 1 VIEW: CPT

## 2017-06-16 PROCEDURE — 80048 BASIC METABOLIC PNL TOTAL CA: CPT

## 2017-06-16 PROCEDURE — 71045 X-RAY EXAM CHEST 1 VIEW: CPT

## 2017-06-16 PROCEDURE — 86304 IMMUNOASSAY TUMOR CA 125: CPT

## 2017-06-16 PROCEDURE — 81001 URINALYSIS AUTO W/SCOPE: CPT

## 2017-06-16 PROCEDURE — 82435 ASSAY OF BLOOD CHLORIDE: CPT

## 2017-06-16 PROCEDURE — 84132 ASSAY OF SERUM POTASSIUM: CPT

## 2017-06-16 PROCEDURE — 93970 EXTREMITY STUDY: CPT

## 2017-06-16 PROCEDURE — 82330 ASSAY OF CALCIUM: CPT

## 2017-06-16 PROCEDURE — 85730 THROMBOPLASTIN TIME PARTIAL: CPT

## 2017-06-16 PROCEDURE — 83735 ASSAY OF MAGNESIUM: CPT

## 2017-06-16 RX ORDER — LISINOPRIL 2.5 MG/1
1 TABLET ORAL
Qty: 0 | Refills: 0 | COMMUNITY

## 2017-06-16 RX ORDER — ERGOCALCIFEROL 1.25 MG/1
1 CAPSULE ORAL
Qty: 0 | Refills: 0 | COMMUNITY

## 2017-06-16 RX ORDER — METOPROLOL TARTRATE 50 MG
12.5 TABLET ORAL
Qty: 0 | Refills: 0 | COMMUNITY
Start: 2017-06-16

## 2017-06-16 RX ORDER — ATORVASTATIN CALCIUM 80 MG/1
1 TABLET, FILM COATED ORAL
Qty: 0 | Refills: 0 | COMMUNITY

## 2017-06-16 RX ORDER — WARFARIN SODIUM 2.5 MG/1
5 TABLET ORAL
Qty: 150 | Refills: 0 | OUTPATIENT
Start: 2017-06-16 | End: 2017-07-16

## 2017-06-16 RX ORDER — LEVETIRACETAM 250 MG/1
1 TABLET, FILM COATED ORAL
Qty: 0 | Refills: 0 | COMMUNITY

## 2017-06-16 RX ORDER — FUROSEMIDE 40 MG
1 TABLET ORAL
Qty: 0 | Refills: 0 | COMMUNITY

## 2017-06-16 RX ORDER — POTASSIUM CHLORIDE 20 MEQ
1 PACKET (EA) ORAL
Qty: 0 | Refills: 0 | COMMUNITY

## 2017-06-16 RX ADMIN — HEPARIN SODIUM 0 UNIT(S)/HR: 5000 INJECTION INTRAVENOUS; SUBCUTANEOUS at 11:36

## 2017-06-16 RX ADMIN — Medication 12.5 MILLIGRAM(S): at 05:23

## 2017-06-16 RX ADMIN — Medication 400 MILLIGRAM(S): at 17:29

## 2017-06-16 RX ADMIN — LEVETIRACETAM 500 MILLIGRAM(S): 250 TABLET, FILM COATED ORAL at 17:29

## 2017-06-16 RX ADMIN — SODIUM CHLORIDE 3 MILLILITER(S): 9 INJECTION INTRAMUSCULAR; INTRAVENOUS; SUBCUTANEOUS at 17:10

## 2017-06-16 RX ADMIN — LISINOPRIL 10 MILLIGRAM(S): 2.5 TABLET ORAL at 05:23

## 2017-06-16 RX ADMIN — Medication 40 MILLIGRAM(S): at 05:23

## 2017-06-16 RX ADMIN — SODIUM CHLORIDE 3 MILLILITER(S): 9 INJECTION INTRAMUSCULAR; INTRAVENOUS; SUBCUTANEOUS at 05:20

## 2017-06-16 RX ADMIN — Medication 12.5 MILLIGRAM(S): at 17:28

## 2017-06-16 RX ADMIN — Medication 400 MILLIGRAM(S): at 05:23

## 2017-06-16 RX ADMIN — HEPARIN SODIUM 1600 UNIT(S)/HR: 5000 INJECTION INTRAVENOUS; SUBCUTANEOUS at 13:00

## 2017-06-16 RX ADMIN — LEVETIRACETAM 500 MILLIGRAM(S): 250 TABLET, FILM COATED ORAL at 05:23

## 2017-06-16 NOTE — PROGRESS NOTE ADULT - ATTENDING COMMENTS
Discussed in detail in length with pts son about pts clinical status and management plan
oob in chair, PT
oob in chair, pt refusing, had extensive lengthy discussion with pt about need for oob in chair and Physical therapy

## 2017-06-16 NOTE — PROGRESS NOTE ADULT - PROBLEM SELECTOR PROBLEM 2
DVT (deep venous thrombosis)
Pancreatic mass
DVT (deep venous thrombosis)
Pancreatic mass

## 2017-06-16 NOTE — PROGRESS NOTE ADULT - SUBJECTIVE AND OBJECTIVE BOX
Follow-up Pulm Progress Note    No new respiratory events overnight.  Denies SOB/CP.     Medications:  MEDICATIONS  (STANDING):  lisinopril 10milliGRAM(s) Oral daily  carBAMazepine XR Tablet 400milliGRAM(s) Oral two times a day  levETIRAcetam 500milliGRAM(s) Oral two times a day  atorvastatin 80milliGRAM(s) Oral at bedtime  furosemide    Tablet 40milliGRAM(s) Oral daily  heparin  Infusion. 1700Unit(s)/Hr IV Continuous <Continuous>  sodium chloride 0.9% lock flush 3milliLiter(s) IV Push every 8 hours  metoprolol 12.5milliGRAM(s) Oral two times a day    MEDICATIONS  (PRN):  heparin  Injectable 05141Gsjg(s) IV Push every 6 hours PRN For aPTT less than 40  heparin  Injectable 5000Unit(s) IV Push every 6 hours PRN For aPTT between 40 - 57      Vent settings (if applicable)      Vital Signs Last 24 Hrs  T(C): 36.8, Max: 36.8 (06-16 @ 04:43)  T(F): 98.2, Max: 98.2 (06-16 @ 04:43)  HR: 60 (60 - 72)  BP: 145/73 (129/65 - 145/73)  BP(mean): 86 (86 - 86)  RR: 18 (18 - 18)  SpO2: 98% (98% - 98%) on RA        I & Os for 24h ending 06-16 @ 07:00  =============================================  IN: 1060 ml / OUT: 1550 ml / NET: -490 ml        LABS:                        11.2   6.3   )-----------( 194      ( 16 Jun 2017 10:36 )             33.9     06-16    136  |  98  |  16  ----------------------------<  144<H>  3.9   |  28  |  1.03    Ca    8.9      16 Jun 2017 10:36            CAPILLARY BLOOD GLUCOSE    PT/INR - ( 16 Jun 2017 10:37 )   PT: 21.8 sec;   INR: 1.97 ratio         PTT - ( 16 Jun 2017 10:37 )  PTT:> 200 sec          Physical Examination:  PULM: Decreased BS throughout  CVS: Regular rate and rhythm, no murmurs, rubs, or gallops    RADIOLOGY REVIEWED

## 2017-06-16 NOTE — PROGRESS NOTE ADULT - SUBJECTIVE AND OBJECTIVE BOX
cc : worsening lower ext pain     SUBJECTIVE / OVERNIGHT EVENTS: denies chest pain, shortness of breath, nausea,v , weakness in lower ext          MEDICATIONS  (STANDING):  lisinopril 10milliGRAM(s) Oral daily  carBAMazepine XR Tablet 400milliGRAM(s) Oral two times a day  levETIRAcetam 500milliGRAM(s) Oral two times a day  atorvastatin 80milliGRAM(s) Oral at bedtime  furosemide    Tablet 40milliGRAM(s) Oral daily  heparin  Infusion. 1700Unit(s)/Hr IV Continuous <Continuous>  sodium chloride 0.9% lock flush 3milliLiter(s) IV Push every 8 hours  metoprolol 12.5milliGRAM(s) Oral two times a day    MEDICATIONS  (PRN):  heparin  Injectable 81029Ccjm(s) IV Push every 6 hours PRN For aPTT less than 40  heparin  Injectable 5000Unit(s) IV Push every 6 hours PRN For aPTT between 40 - 57      Vital Signs Last 24 Hrs  T(C): 36.8, Max: 36.8 (06-16 @ 04:43)  T(F): 98.2, Max: 98.2 (06-16 @ 04:43)  HR: 60 (60 - 72)  BP: 145/73 (129/65 - 145/73)  BP(mean): 86 (86 - 86)  RR: 18 (18 - 18)  SpO2: 98% (98% - 98%)    constitutional: No fever, fatigue or weight loss.  Skin: No rash.  Eyes: No recent vision problems or eye pain.  ENT: No congestion, ear pain, or sore throat.  Endocrine: No thyroid problems.  Cardiovascular: No chest pain or palpation.  Respiratory: No cough, shortness of breath, congestion, or wheezing.  Gastrointestinal: No abdominal pain, nausea, vomiting, or diarrhea.  Genitourinary: No dysuria.  Musculoskeletal: No joint swelling.  Neurologic: No headache.      PHYSICAL EXAM:  GENERAL: morbidly obese  EYES: EOMI, PERRLA, conjunctiva and sclera clear  NECK: Supple, No JVD  CHEST/LUNG: dec breath sounds at bases  HEART: Regular rate and rhythm;   ABDOMEN: Soft, Nontender, mild distenson+; Bowel sounds present  EXTREMITIES:  2+ Peripheral Pulses, No clubbing, cyanosis, or edema  Neuro : alert, awake calm , cooperative  SKIN: erythematous  rodolfo lower ext , chronic venous stasis changes +, fungal toe nails, dry scaly skin over rt foot            LABS:  06-16    136  |  98  |  16  ----------------------------<  144<H>  3.9   |  28  |  1.03    Ca    8.9      16 Jun 2017 10:36      Creatinine Trend: 1.03 <--, 1.12 <--, 1.05 <--, 1.05 <--, 1.02 <--                        11.2   6.3   )-----------( 194      ( 16 Jun 2017 10:36 )             33.9     Urine Studies:              PT/INR - ( 16 Jun 2017 10:37 )   PT: 21.8 sec;   INR: 1.97 ratio         PTT - ( 16 Jun 2017 10:37 )  PTT:> 200 sec

## 2017-06-16 NOTE — PROGRESS NOTE ADULT - ASSESSMENT
67 y.o. male history of a CVA with left hemiparesis, DVT's in the past with PE's, morbid obesity, sz d/o, expressive aphasia who presents with acute DVT having had warfarin dc 2w ago, and with pancreatic mass s/p EUS with biopsy, results consistent with pancreatic cyst, 32 beats WCT, atypical chest pain, normal LV function:    -no further ischemic testing is recommended at this point  -cont metoprolol 12.5 PO BID  -monitor HR and BP  -edema with venous stasis, can cont po lasix  --heparin gtt to be continued with goal PTT 60-90.  Dose Coumadin. Goal INR 2-3.   -no ivc filter as per vas med  -monitor and replete electrolytes  -supp oxygen as needed  - All other workup per primary team. Will followup.

## 2017-06-16 NOTE — PROGRESS NOTE ADULT - SUBJECTIVE AND OBJECTIVE BOX
INTERVAL HPI/OVERNIGHT EVENTS: No GI complaints. Discussed H. pylori    MEDICATIONS  (STANDING):  lisinopril 10milliGRAM(s) Oral daily  carBAMazepine XR Tablet 400milliGRAM(s) Oral two times a day  levETIRAcetam 500milliGRAM(s) Oral two times a day  atorvastatin 80milliGRAM(s) Oral at bedtime  furosemide    Tablet 40milliGRAM(s) Oral daily  heparin  Infusion. 1700Unit(s)/Hr IV Continuous <Continuous>  sodium chloride 0.9% lock flush 3milliLiter(s) IV Push every 8 hours  metoprolol 12.5milliGRAM(s) Oral two times a day    MEDICATIONS  (PRN):  heparin  Injectable 27401Zvft(s) IV Push every 6 hours PRN For aPTT less than 40  heparin  Injectable 5000Unit(s) IV Push every 6 hours PRN For aPTT between 40 - 57      Allergies    No Known Allergies    Intolerances            PHYSICAL EXAM:   Vital Signs:  Vital Signs Last 24 Hrs  T(C): 36.8, Max: 36.8 (06-16 @ 04:43)  T(F): 98.2, Max: 98.2 (06-16 @ 04:43)  HR: 60 (60 - 72)  BP: 145/73 (102/64 - 145/73)  BP(mean): 86 (86 - 86)  RR: 18 (18 - 18)  SpO2: 98% (98% - 100%)  Daily     Daily     GENERAL:  no distress  HEENT:  NC/AT,  anicteric  CHEST:   no increased effort, breath sounds clear  HEART:  Regular rhythm  ABDOMEN:  Soft, non-tender, non-distended, normoactive bowel sounds,  no masses but body habitus limits exam ,no hepato-splenomegaly,  LABS:                        12.4   7.1   )-----------( 221      ( 15 Trevon 2017 08:27 )             37.5           PT/INR - ( 15 Trevon 2017 08:26 )   PT: 16.7 sec;   INR: 1.53 ratio         PTT - ( 15 Trevon 2017 08:26 )  PTT:96.2 sec      RADIOLOGY & ADDITIONAL TESTS:

## 2017-06-16 NOTE — PROGRESS NOTE ADULT - SUBJECTIVE AND OBJECTIVE BOX
Vascular Medicine Inpatient Progress Note    Asked by Dr. Hamm to evaluate patient with acute DVT.    Remains hemodynamically stable.  Tolerating anticoagulation.  Started on warfarin.  INR goal 2-3.  INR: 1.53 yesterday.     EXAM:  CT ANGIO CHEST (W)AW IC                            PROCEDURE DATE:  06/07/2017        INTERPRETATION:  CLINICAL INFORMATION: Palpitations and leg pain. History   of PE recently taken off of anticoagulation.    COMPARISON: CT chest February 5, 2010     PROCEDURE:   CTA of the Chest was performed withintravenous contrast.  90 ml of Omnipaque 350 was injected intravenously. 10 ml were discarded.  Sagittal and coronal reformats were performed as well as 3D   Reconstructions.    FINDINGS:    CHEST:     LUNGS AND LARGE AIRWAYS: Patent central airways. Right upper lobe nodule   (8:58) measures 7 mm, unchanged from the prior study.  PLEURA: No pleural effusion.  VESSELS: There is no central, lobar, or segmental pulmonary embolus.   Evaluation of the subsegmental branches is limited secondary to body   habitus and motion artifact. Atherosclerotic calcifications of the aorta.  HEART: Heart size is normal.No pericardial effusion.  MEDIASTINUM AND WALTER: No lymphadenopathy.  CHEST WALL AND LOWER NECK: Within normal limits.  VISUALIZED UPPER ABDOMEN:Fatty atrophy of the pancreas. Indeterminant an   ill-defined soft tissue density pancreatic body lesion measuring 2.5 x   2.9 x 3.3 cm with interval increase in size since February 5, 2010   measured about 2 x 1.5 cm.  BONES: Within normal limits.    IMPRESSION:   1.  No central, lobar, or segmental pulmonary embolus. Evaluation of the   subsegmental branches is limited secondary to body habitus and motion   artifact.  2.  Soft tissue density pancreatic body lesion measuring 2.5 x 2.9 x 3.3   cmwith interval increase in size since February 5, 2010 worrisome for   neoplastic etiology. GI consultation is recommended.      ***Please see the addendum at the top of this report. It may contain   additional important information or changes.****      LINDA MUNOZ M.D., RADIOLOGY RESIDENT  This document has been electronically signed.  ANUSHKA WINTERS M.D. ATTENDING RADIOLOGIST  This document has been electronically signed. Jun 7 2017  6:58PM  Addend:  ANUSHKA WINTERS M.D. ATTENDING RADIOLOGIST  This addendum was electronically signed on: Jun 7 2017  8:14PM.            EXAM:  DUPLEX SCAN EXT VEINS LOWER BI                            PROCEDURE DATE:  06/07/2017        INTERPRETATION:  History:On a prior examination dated 3/2/2011, a short   segment of nonocclusive thrombus was identified in the right common   femoral vein, IVC filter placed, recently anticoagulation medicines had   been halted, left lower extremity swelling, rule out DVT.    There is edema within the superficial soft tissues of the left lower   extremity.    On the right, there remains intraluminal filling defects similar in   appearance to those 6 years earlier, representing the residue of prior   DVT.    There is acute DVT, almost occlusive to flow, affecting the left femoral   and common femoral veins.    The left greater saphenous vein, a superficial vein, is also thrombosed.    The calf veins were not diagnostically imaged.    IMPRESSION: There is acute almost occlusive DVT affecting the left   femoral and common femoral veins.  Superficial thrombophlebitis affects the left greater saphenous vein.    MEDICATIONS  (STANDING):  lisinopril 10milliGRAM(s) Oral daily  carBAMazepine XR Tablet 400milliGRAM(s) Oral two times a day  levETIRAcetam 500milliGRAM(s) Oral two times a day  atorvastatin 80milliGRAM(s) Oral at bedtime  furosemide    Tablet 40milliGRAM(s) Oral daily  heparin  Infusion. 1700Unit(s)/Hr IV Continuous <Continuous>  sodium chloride 0.9% lock flush 3milliLiter(s) IV Push every 8 hours  metoprolol 12.5milliGRAM(s) Oral two times a day    MEDICATIONS  (PRN):  heparin  Injectable 05902Ysyk(s) IV Push every 6 hours PRN For aPTT less than 40  heparin  Injectable 5000Unit(s) IV Push every 6 hours PRN For aPTT between 40 - 57      Vital Signs Last 24 Hrs  T(C): 36.8, Max: 36.8 (06-16 @ 04:43)  T(F): 98.2, Max: 98.2 (06-16 @ 04:43)  HR: 60 (60 - 72)  BP: 145/73 (102/64 - 145/73)  BP(mean): 86 (86 - 86)  RR: 18 (18 - 18)  SpO2: 98% (98% - 100%)    Appearance: with morbid obesity, appears chronically ill  HEENT:   Normal oral mucosa, PERRL, EOMI	  Lymphatic: No obvious lymphadenopathy  Cardiovascular: Normal S1 S2, No JVD, No murmurs,  Respiratory: Decreased BS anteriorly, poor inspiratory effort	  Psychiatry: A & O x 3, Mood & affect appropriate  Gastrointestinal:  Obese, otherwise soft, Non-tender, + BS	  Skin: +chronic venous stasis changes LLE>RLE, skin discoloration	  Neurologic: +B/L weakness  Extremities: Edema as noted above.      LABS:	 	                                   12.4   7.1   )-----------( 221      ( 15 Trevon 2017 08:27 )             37.5     PT/INR - ( 15 Trevon 2017 08:26 )   PT: 16.7 sec;   INR: 1.53 ratio         PTT - ( 15 Trevon 2017 08:26 )  PTT:96.2 sec    Patient name: CYNTHIA TAPIA  YOB: 1950   Age: 67 (M)   MR#: [id number]  Study Date: 6/8/2017  Location: [address] [place], [place] [id number]: Alyson Emerson RDCS  Study quality: Technically difficult  Referring Physician: Bipin Hamm MD  Blood Pressure: 114/72 mmHg  Height: 180 cm  Weight: 192 kg  BSA: 2.9 m2  ------------------------------------------------------------------------  PROCEDURE: Transthoracic echocardiogram with 2-D, M-Mode  and complete spectral and color flow Doppler. Verbal  consent was obtained for injection of echo contrast  following a discussion of risks and benefits. Following  intravenous injection of contrast, harmonic imaging was  performed.  INDICATION: Chest pain, unspecified (R07.9)  ------------------------------------------------------------------------  Dimensions:    Normal Values:  LA:            2.0 - 4.0 cm  Ao:     3.4    2.0 - 3.8 cm  SEPTUM: 1.1    0.6 - 1.2 cm  PWT:    1.2    0.6 - 1.1 cm  LVIDd:  5.5    3.0 - 5.6 cm  LVIDs:  3.2    1.8 - 4.0 cm  Derived variables:  LVMI: 89 g/m2  RWT: 0.43  Fractional short: 42 %  EF (Teicholtz): 72 %  Doppler Peak Velocity (m/sec): AoV=2.0  ------------------------------------------------------------------------  Observations:  Mitral Valve: Mitral annular calcification, otherwise  normal mitral valve. Minimal mitral regurgitation.  Aortic Valve/Aorta: Aortic valve not well visualized;  appears calcified. No aortic valve regurgitation seen. Peak  left ventricular outflow tract gradient equals 3 mm Hg,  mean gradient is equal to 1 mm Hg, LVOT velocity time  integral equals 16 cm.  Aortic Root: 3.4 cm.  LVOT diameter: 2.2 cm.  Left Atrium: Normal left atrium.  Left Ventricle: Endocardial visualization enhanced with  intravenous injection of echo contrast (Definity).  Endocardium not well visualized despite the use of echo  contrast. Grossly normal LV function. Suboptimal images  precludes accurage evaluation for regional wall motion  abnormalities. Normal left ventricular internal dimensions  and wall thicknesses.  Right Heart: Normal right atrium. The right ventricle is  not well visualized; grossly normal right ventricular  systolic function. Normal tricuspid valve. Minimal  tricuspid regurgitation. Pulmonic valve not well  visualized.  Pericardium/Pleura: Normal pericardium with no pericardial  effusion.  Hemodynamic: Estimated right atrial pressure is 8 mm Hg.  Estimated right ventricular systolic pressure equals 28 mm  Hg, assuming right atrial pressure equals 8 mm Hg,  consistent with normal pulmonary pressures.  ------------------------------------------------------------------------  Conclusions:  1. Normal left ventricular internal dimensions and wall  thicknesses.  2. Endocardial visualization enhanced with intravenous  injection of echo contrast (Definity). Endocardium not well  visualized despite the use of echo contrast. Grossly normal  LV function. Suboptimal images precludes accurage  evaluation for regional wall motion abnormalities.  3. The right ventricle is notwell visualized; grossly  normal right ventricular systolic function.  *** No previous Echo exam.  ------------------------------------------------------------------------  Confirmed on  6/8/2017 - 13:05:14 by Max Soliz M.D.  ------------------------------------------------------------------------      Provoked VTE -- on anticoagulation with heparin.  Hemodynamically stable, no suggestion of respiratory distress.    --Agree with heparin gtt-->warfarin.  INR goal 2-3.  Duration of therapy may need to be extended/indefinite as long as benefits outweigh risks.  --Do not recommend IVC filter as patient is likely hypercoagulable with underlying neoplasm.  --Will follow with you--thank you.

## 2017-06-16 NOTE — PROGRESS NOTE ADULT - ASSESSMENT
68 y/o M with history of provoked PE-DVT 2nd tobacco use and immobility s/p [place] filter (90s), CVA with residual R hemiplegia (2001), seizure history, primarily bedbound now. Admitted with increased LE pain after recently stopping coumadin. Found to have acute on chronic DVT. No evidence of PE. Incidentally noted pancreatic lesion being recommended for EUS. Called for pre-procedural assessment. Tolerated EUS w/o incident. 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 Episode of Vtach-no further w/u indicated

## 2017-06-16 NOTE — PROGRESS NOTE ADULT - SUBJECTIVE AND OBJECTIVE BOX
[facility]    Yan Loaiza, Carly, Jen, Tomy Azevedo      [phone]    CHIEF COMPLAINT: Patient is a 67y old  Male who presents with a chief complaint of worsening left lower ext pain (13 Jun 2017 13:51)      Follow Up: dvt, cva/hemipar, pancreatic cystadenoma    Interim history: The patient reports no new symptoms.  Denies chest discomfort and shortness of breath.  No abdominal pain.  No new neurologic symptoms.    MEDICATIONS  (STANDING):  lisinopril 10milliGRAM(s) Oral daily  carBAMazepine XR Tablet 400milliGRAM(s) Oral two times a day  levETIRAcetam 500milliGRAM(s) Oral two times a day  atorvastatin 80milliGRAM(s) Oral at bedtime  furosemide    Tablet 40milliGRAM(s) Oral daily  heparin  Infusion. 1700Unit(s)/Hr IV Continuous <Continuous>  sodium chloride 0.9% lock flush 3milliLiter(s) IV Push every 8 hours  metoprolol 12.5milliGRAM(s) Oral two times a day    MEDICATIONS  (PRN):  heparin  Injectable 53163Nuxw(s) IV Push every 6 hours PRN For aPTT less than 40  heparin  Injectable 5000Unit(s) IV Push every 6 hours PRN For aPTT between 40 - 57      REVIEW OF SYSTEMS:  eye, ent, GI, , allergic, dermatologic, musculoskeletal and neurologic are negative except as described above    Vital Signs Last 24 Hrs  T(C): 36.8, Max: 36.8 (06-16 @ 04:43)  T(F): 98.2, Max: 98.2 (06-16 @ 04:43)  HR: 60 (60 - 72)  BP: 145/73 (102/64 - 145/73)  BP(mean): 86 (86 - 86)  RR: 18 (18 - 18)  SpO2: 98% (98% - 100%)    I&O's Summary    I & Os for current day (as of 16 Jun 2017 08:41)  =============================================  IN: 1060 ml / OUT: 1550 ml / NET: -490 ml      Telemetry past 24h: sr, no vt    PHYSICAL EXAM:    General: NAD, awake and alert, oriented x 3  HEENT: Mucous membranes are moist, anicteric  Lungs: Non-labored, breath sounds are clear bilaterally, No wheezing, rales or rhonchi.  Decreased breath sounds bilaterally  Cardiovascular: Regular, S1 and S2, no murmurs, rubs, or gallops. Distant heart sounds  Gastrointestinal: Bowel Sounds present, soft, nontender.   Obese  Lymph: 2+ peripheral edema. No lymphadenopathy.  Skin: b/l LE venous stasis changes    LABS: All Labs Reviewed:                        12.4   7.1   )-----------( 221      ( 15 Trevon 2017 08:27 )             37.5                         11.5   7.04  )-----------( 209      ( 14 Jun 2017 07:29 )             34.5     13 Jun 2017 17:39    137    |  100    |  16     ----------------------------<  132    4.5     |  27     |  1.12     Ca    8.9        13 Jun 2017 17:39  Mg     2.1       13 Jun 2017 15:13      PT/INR - ( 15 Trevon 2017 08:26 )   PT: 16.7 sec;   INR: 1.53 ratio         PTT - ( 15 Trevon 2017 08:26 )  PTT:96.2 sec      Blood Culture:         RADIOLOGY:    EKG:    Echo:      Patient name: CYNTHIA TAPIA  YOB: 1950   Age: 67 (M)   MR#: [id number]  Study Date: 6/8/2017  Location: [address] [place], [place] [id number]: Alyson Emerson RDCS  Study quality: Technically difficult  Referring Physician: Bipin Hamm MD  Blood Pressure: 114/72 mmHg  Height: 180 cm  Weight: 192 kg  BSA: 2.9 m2  ------------------------------------------------------------------------  PROCEDURE: Transthoracic echocardiogram with 2-D, M-Mode  and complete spectral and color flow Doppler. Verbal  consent was obtained for injection of echo contrast  following a discussion of risks and benefits. Following  intravenous injection of contrast, harmonic imaging was  performed.  INDICATION: Chest pain, unspecified (R07.9)  ------------------------------------------------------------------------  Dimensions:    Normal Values:  LA:            2.0 - 4.0 cm  Ao:     3.4    2.0 - 3.8 cm  SEPTUM: 1.1    0.6 - 1.2 cm  PWT:    1.2    0.6 - 1.1 cm  LVIDd:  5.5    3.0 - 5.6 cm  LVIDs:  3.2    1.8 - 4.0 cm  Derived variables:  LVMI: 89 g/m2  RWT: 0.43  Fractional short: 42 %  EF (Peteicholtz): 72 %  Doppler Peak Velocity (m/sec): AoV=2.0  ------------------------------------------------------------------------  Observations:  Mitral Valve: Mitral annular calcification, otherwise  normal mitral valve. Minimal mitral regurgitation.  Aortic Valve/Aorta: Aortic valve not well visualized;  appears calcified. No aortic valve regurgitation seen. Peak  left ventricular outflow tract gradient equals 3 mm Hg,  mean gradient is equal to 1 mm Hg, LVOT velocity time  integral equals 16 cm.  Aortic Root: 3.4 cm.  LVOT diameter: 2.2 cm.  Left Atrium: Normal left atrium.  Left Ventricle: Endocardial visualization enhanced with  intravenous injection of echo contrast (Definity).  Endocardium not well visualized despite the use of echo  contrast. Grossly normal LV function. Suboptimal images  precludes accurage evaluation for regional wall motion  abnormalities. Normal left ventricular internal dimensions  and wall thicknesses.  Right Heart: Normal right atrium. The right ventricle is  not well visualized; grossly normal right ventricular  systolic function. Normal tricuspid valve. Minimal  tricuspid regurgitation. Pulmonic valve not well  visualized.  Pericardium/Pleura: Normal pericardium with no pericardial  effusion.  Hemodynamic: Estimated right atrial pressure is 8 mm Hg.  Estimated right ventricular systolic pressure equals 28 mm  Hg, assuming right atrial pressure equals 8 mm Hg,  consistent with normal pulmonary pressures.  ------------------------------------------------------------------------  Conclusions:  1. Normal left ventricular internal dimensions and wall  thicknesses.  2. Endocardial visualization enhanced with intravenous  injection of echo contrast (Definity). Endocardium not well  visualized despite the use of echo contrast. Grossly normal  LV function. Suboptimal images precludes accurage  evaluation for regional wall motion abnormalities.  3. The right ventricle is notwell visualized; grossly  normal right ventricular systolic function.  *** No previous Echo exam.  ------------------------------------------------------------------------  Confirmed on  6/8/2017 - 13:05:14 by Max Soliz M.D.  ------------------------------------------------------------------------

## 2017-06-16 NOTE — PROGRESS NOTE ADULT - PROBLEM SELECTOR PLAN 2
? eus after pulm clearance/ after pt sons/ pts consent
s/p EUS c/w cyst
-AXR reveals presence of IVC filter   - cont heparin
-AXR reveals presence of IVC filter   - cont heparin  - restart A/C unless contraindication
-AXR reveals presence of IVC filter   -Heparin gtt to coumadin bridge, lifelong AC for provoked VTE as provoking factor unlikely to improve (immobility)
-AXR reveals presence of IVC filter   -Heparin gtt to coumadin bridge, lifelong AC for provoked VTE as provoking factor unlikely to improve (immobility)
-AXR reveals presence of IVC filter   -Heparin gtt to coumadin bridge, lifelong AC for provoked VTE as provoking factor unlikely to improve (immobility)  -Consider bridging with lovenox?
? eus after pulm clearance/ after pt sons/ pts consent
-AXR reveals presence of IVC filter   -Heparin on hold for EUS today for pancreatic mass  -Eventual resumption of heparin gtt with coumadin bridge.

## 2017-06-16 NOTE — PROGRESS NOTE ADULT - PROVIDER SPECIALTY LIST ADULT
Anesthesia
Cardiology
Gastroenterology
Internal Medicine
Pulmonology
Internal Medicine
Surgery
Surgery
Internal Medicine

## 2017-06-16 NOTE — PROGRESS NOTE ADULT - PROBLEM SELECTOR PLAN 3
cont current dose of anticonvulsants
-EP consult appreciated  -No further w/u as patient not a candidate for cardiac cath  -Started on beta blocker
cont current dose of anticonvulsants

## 2017-06-16 NOTE — PROGRESS NOTE ADULT - PROBLEM SELECTOR PROBLEM 3
Seizure disorder
NSVT (nonsustained ventricular tachycardia)
Seizure disorder

## 2017-06-16 NOTE — PROGRESS NOTE ADULT - PROBLEM SELECTOR PROBLEM 1
Deep vein thrombosis (DVT) of both lower extremities, unspecified chronicity, unspecified vein
Preprocedural respiratory examination
Deep vein thrombosis (DVT) of both lower extremities, unspecified chronicity, unspecified vein

## 2017-06-16 NOTE — PROGRESS NOTE ADULT - PROBLEM SELECTOR PLAN 1
cont ac with heparin  vascular medicine evaluated pt - no IVC filter   cards evaluated pt- echo is nl lv fx
cont ac with heparin  vascular medicine evaluated pt - no IVC filter   cards evaluated pt- echo is nl lv fx
cont ac with heparin, start coumadin tonight  vascular medicine evaluated pt - no IVC filter   cards evaluated pt- echo is nl lv fx
cont ac with heparin, started on coumadin   vascular medicine evaluated pt , abd cxr with IVC filter in place  cards evaluated pt- echo is nl lv fx
cont ac with heparin, started on coumadin   vascular medicine evaluated pt , abd cxr with IVC filter inplace  cards evaluated pt- echo is nl lv fx
-Outpt PSG for suspected ISIDRO
-Outpt PSG for suspected ISIDRO
-Outpt PSG for suspected ISIDRO  -Stable pulmonary exam
cont ac with heparin  vascular medicine evaluated pt - no IVC filter   cards evaluated pt- echo
-Pulm risk stratification documented in consult note yesterday by attending  -Outpt PSG for suspected ISIDRO

## 2018-07-26 ENCOUNTER — RX RENEWAL (OUTPATIENT)
Age: 68
End: 2018-07-26

## 2018-08-27 ENCOUNTER — RX RENEWAL (OUTPATIENT)
Age: 68
End: 2018-08-27

## 2018-12-15 ENCOUNTER — RX RENEWAL (OUTPATIENT)
Age: 68
End: 2018-12-15

## 2018-12-19 ENCOUNTER — RX RENEWAL (OUTPATIENT)
Age: 68
End: 2018-12-19

## 2018-12-20 ENCOUNTER — OTHER (OUTPATIENT)
Age: 68
End: 2018-12-20

## 2018-12-20 ENCOUNTER — MEDICATION RENEWAL (OUTPATIENT)
Age: 68
End: 2018-12-20

## 2018-12-21 ENCOUNTER — LABORATORY RESULT (OUTPATIENT)
Age: 68
End: 2018-12-21

## 2018-12-27 ENCOUNTER — MESSAGE (OUTPATIENT)
Age: 68
End: 2018-12-27

## 2019-01-04 ENCOUNTER — APPOINTMENT (OUTPATIENT)
Dept: INTERNAL MEDICINE | Facility: HOME HEALTH | Age: 69
End: 2019-01-04
Payer: MEDICARE

## 2019-01-04 VITALS — SYSTOLIC BLOOD PRESSURE: 120 MMHG | DIASTOLIC BLOOD PRESSURE: 80 MMHG

## 2019-01-04 DIAGNOSIS — Z86.711 PERSONAL HISTORY OF PULMONARY EMBOLISM: ICD-10-CM

## 2019-01-04 DIAGNOSIS — G81.91 HEMIPLEGIA, UNSPECIFIED AFFECTING RIGHT DOMINANT SIDE: ICD-10-CM

## 2019-01-04 DIAGNOSIS — Z87.891 PERSONAL HISTORY OF NICOTINE DEPENDENCE: ICD-10-CM

## 2019-01-04 PROCEDURE — 99350 HOME/RES VST EST HIGH MDM 60: CPT

## 2019-01-04 PROCEDURE — 99344 HOME/RES VST NEW MOD MDM 60: CPT

## 2019-02-19 ENCOUNTER — MOBILE ON CALL (OUTPATIENT)
Age: 69
End: 2019-02-19

## 2019-02-20 ENCOUNTER — LABORATORY RESULT (OUTPATIENT)
Age: 69
End: 2019-02-20

## 2019-02-20 ENCOUNTER — CLINICAL ADVICE (OUTPATIENT)
Age: 69
End: 2019-02-20

## 2019-02-22 ENCOUNTER — LABORATORY RESULT (OUTPATIENT)
Age: 69
End: 2019-02-22

## 2019-02-23 ENCOUNTER — CLINICAL ADVICE (OUTPATIENT)
Age: 69
End: 2019-02-23

## 2019-02-26 ENCOUNTER — LABORATORY RESULT (OUTPATIENT)
Age: 69
End: 2019-02-26

## 2019-02-28 ENCOUNTER — RX RENEWAL (OUTPATIENT)
Age: 69
End: 2019-02-28

## 2019-03-01 ENCOUNTER — LABORATORY RESULT (OUTPATIENT)
Age: 69
End: 2019-03-01

## 2019-03-08 ENCOUNTER — LABORATORY RESULT (OUTPATIENT)
Age: 69
End: 2019-03-08

## 2019-03-09 ENCOUNTER — CHART COPY (OUTPATIENT)
Age: 69
End: 2019-03-09

## 2019-03-13 ENCOUNTER — RX RENEWAL (OUTPATIENT)
Age: 69
End: 2019-03-13

## 2019-03-13 RX ORDER — ERGOCALCIFEROL 1.25 MG/1
1.25 MG CAPSULE, LIQUID FILLED ORAL
Qty: 12 | Refills: 3 | Status: ACTIVE | COMMUNITY
Start: 2019-03-13 | End: 1900-01-01

## 2019-03-15 ENCOUNTER — MOBILE ON CALL (OUTPATIENT)
Age: 69
End: 2019-03-15

## 2019-03-18 ENCOUNTER — LABORATORY RESULT (OUTPATIENT)
Age: 69
End: 2019-03-18

## 2019-03-19 ENCOUNTER — LABORATORY RESULT (OUTPATIENT)
Age: 69
End: 2019-03-19

## 2019-03-22 ENCOUNTER — LABORATORY RESULT (OUTPATIENT)
Age: 69
End: 2019-03-22

## 2019-03-23 ENCOUNTER — OTHER (OUTPATIENT)
Age: 69
End: 2019-03-23

## 2019-03-29 ENCOUNTER — LABORATORY RESULT (OUTPATIENT)
Age: 69
End: 2019-03-29

## 2019-04-05 ENCOUNTER — LABORATORY RESULT (OUTPATIENT)
Age: 69
End: 2019-04-05

## 2019-04-09 ENCOUNTER — APPOINTMENT (OUTPATIENT)
Dept: INTERNAL MEDICINE | Facility: CLINIC | Age: 69
End: 2019-04-09
Payer: MEDICARE

## 2019-04-09 ENCOUNTER — APPOINTMENT (OUTPATIENT)
Dept: INTERNAL MEDICINE | Facility: HOME HEALTH | Age: 69
End: 2019-04-09

## 2019-04-09 ENCOUNTER — TRANSCRIPTION ENCOUNTER (OUTPATIENT)
Age: 69
End: 2019-04-09

## 2019-04-09 VITALS — SYSTOLIC BLOOD PRESSURE: 138 MMHG | DIASTOLIC BLOOD PRESSURE: 78 MMHG

## 2019-04-09 PROCEDURE — 99349 HOME/RES VST EST MOD MDM 40: CPT

## 2019-04-09 RX ORDER — POTASSIUM CHLORIDE 1500 MG/1
20 TABLET, EXTENDED RELEASE ORAL
Qty: 90 | Refills: 0 | Status: ACTIVE | COMMUNITY
Start: 2019-01-28

## 2019-04-09 NOTE — PLAN
[FreeTextEntry1] : set up labs for future with carbamazizne level.\par .same coumadin.says he needs no refills at this time

## 2019-04-09 NOTE — REVIEW OF SYSTEMS
[Negative] : Heme/Lymph [FreeTextEntry2] : feels well [FreeTextEntry7] : mild constipation [FreeTextEntry9] : denies [de-identified] : skin with chronic venous changes is stable [de-identified] : has dense right hemiparesis and stable

## 2019-04-09 NOTE — ASSESSMENT
[FreeTextEntry1] : chronic venous stasis with h/o of recurent dvt\par h/o seizures\par h/o of cva\par HT \par morbid obesity

## 2019-04-11 ENCOUNTER — RX RENEWAL (OUTPATIENT)
Age: 69
End: 2019-04-11

## 2019-04-26 ENCOUNTER — LABORATORY RESULT (OUTPATIENT)
Age: 69
End: 2019-04-26

## 2019-05-30 ENCOUNTER — RX RENEWAL (OUTPATIENT)
Age: 69
End: 2019-05-30

## 2019-06-04 ENCOUNTER — LABORATORY RESULT (OUTPATIENT)
Age: 69
End: 2019-06-04

## 2019-06-04 ENCOUNTER — RX RENEWAL (OUTPATIENT)
Age: 69
End: 2019-06-04

## 2019-06-04 RX ORDER — LISINOPRIL 10 MG/1
10 TABLET ORAL DAILY
Qty: 90 | Refills: 3 | Status: ACTIVE | COMMUNITY
Start: 2018-12-20 | End: 1900-01-01

## 2019-07-02 ENCOUNTER — LABORATORY RESULT (OUTPATIENT)
Age: 69
End: 2019-07-02

## 2019-07-30 ENCOUNTER — LABORATORY RESULT (OUTPATIENT)
Age: 69
End: 2019-07-30

## 2019-08-27 ENCOUNTER — LABORATORY RESULT (OUTPATIENT)
Age: 69
End: 2019-08-27

## 2019-09-17 ENCOUNTER — APPOINTMENT (OUTPATIENT)
Dept: INTERNAL MEDICINE | Facility: HOME HEALTH | Age: 69
End: 2019-09-17
Payer: MEDICARE

## 2019-09-17 VITALS — SYSTOLIC BLOOD PRESSURE: 122 MMHG | DIASTOLIC BLOOD PRESSURE: 82 MMHG

## 2019-09-17 DIAGNOSIS — E78.5 HYPERLIPIDEMIA, UNSPECIFIED: ICD-10-CM

## 2019-09-17 DIAGNOSIS — E66.01 MORBID (SEVERE) OBESITY DUE TO EXCESS CALORIES: ICD-10-CM

## 2019-09-17 PROCEDURE — 90662 IIV NO PRSV INCREASED AG IM: CPT

## 2019-09-17 PROCEDURE — G0008: CPT

## 2019-09-17 PROCEDURE — 99349 HOME/RES VST EST MOD MDM 40: CPT | Mod: 25

## 2019-09-17 NOTE — HISTORY OF PRESENT ILLNESS
[FreeTextEntry3] : for follow u[ on chronic illness [FreeTextEntry2] : He had a cva with right sided weakness in 1997. he has had no recent dvt and is on chronic anticaog, this was stopped at one point and the dvt recurred\par

## 2019-09-17 NOTE — PHYSICAL EXAM
[Normal Sclera/Conjunctiva] : normal sclera/conjunctiva [Normal Outer Ear/Nose] : the outer ears and nose were normal in appearance [Supple] : supple [No JVD] : no jugular venous distention [No Respiratory Distress] : no respiratory distress  [Soft] : abdomen soft [No Carotid Bruits] : no carotid bruits [Declined Breast Exam] : declined breast exam  [Non Tender] : non-tender [No Spinal Tenderness] : no spinal tenderness [No CVA Tenderness] : no CVA  tenderness [No Joint Swelling] : no joint swelling [Grossly Normal Strength/Tone] : grossly normal strength/tone [Normal Mood] : the mood was normal [Speech Grossly Normal] : speech grossly normal [de-identified] : 4+ non pitting edema of RLE, 3+ non pitting edema of RLE, brownish discoloration of skin. good pulses [de-identified] : alert able to sit up without assistance. has right sided riley paresis. [de-identified] : def [FreeTextEntry1] : def [de-identified] : unable to walk without assistance

## 2019-09-17 NOTE — REVIEW OF SYSTEMS
[Negative] : Heme/Lymph [FreeTextEntry5] : is on bp meds [FreeTextEntry8] : denies c/o [FreeTextEntry2] : no oc/o [de-identified] : right sided weakness. no recent seizure x many years

## 2019-09-19 ENCOUNTER — LABORATORY RESULT (OUTPATIENT)
Age: 69
End: 2019-09-19

## 2019-09-24 ENCOUNTER — LABORATORY RESULT (OUTPATIENT)
Age: 69
End: 2019-09-24

## 2019-10-10 NOTE — DISCHARGE NOTE ADULT - NS AS DC PROVIDER CONTACT Y/N MULTI
Refill request : Hydrocodone - Acetaminophen  mg    Last filled : 9/12/19 #120     Last appointment : 9/30/19    Next appointment : Nothing scheduled Yes

## 2019-10-22 ENCOUNTER — LABORATORY RESULT (OUTPATIENT)
Age: 69
End: 2019-10-22

## 2019-11-19 ENCOUNTER — LABORATORY RESULT (OUTPATIENT)
Age: 69
End: 2019-11-19

## 2019-12-17 ENCOUNTER — LABORATORY RESULT (OUTPATIENT)
Age: 69
End: 2019-12-17

## 2020-01-14 ENCOUNTER — RX RENEWAL (OUTPATIENT)
Age: 70
End: 2020-01-14

## 2020-01-14 RX ORDER — DEXTROMETHORPHAN HYDROBROMIDE AND QUINIDINE SULFATE 20; 10 MG/1; MG/1
20-10 CAPSULE, GELATIN COATED ORAL DAILY
Qty: 90 | Refills: 4 | Status: ACTIVE | COMMUNITY
Start: 2019-01-04 | End: 1900-01-01

## 2020-02-21 ENCOUNTER — APPOINTMENT (OUTPATIENT)
Dept: INTERNAL MEDICINE | Facility: HOME HEALTH | Age: 70
End: 2020-02-21
Payer: MEDICARE

## 2020-02-21 VITALS — SYSTOLIC BLOOD PRESSURE: 140 MMHG | DIASTOLIC BLOOD PRESSURE: 90 MMHG

## 2020-02-21 DIAGNOSIS — Z00.00 ENCOUNTER FOR GENERAL ADULT MEDICAL EXAMINATION W/OUT ABNORMAL FINDINGS: ICD-10-CM

## 2020-02-21 DIAGNOSIS — I69.320 APHASIA FOLLOWING CEREBRAL INFARCTION: ICD-10-CM

## 2020-02-21 DIAGNOSIS — I10 ESSENTIAL (PRIMARY) HYPERTENSION: ICD-10-CM

## 2020-02-21 DIAGNOSIS — R56.9 UNSPECIFIED CONVULSIONS: ICD-10-CM

## 2020-02-21 DIAGNOSIS — I82.513 CHRONIC EMBOLISM AND THROMBOSIS OF FEMORAL VEIN, BILATERAL: ICD-10-CM

## 2020-02-21 PROCEDURE — 99349 HOME/RES VST EST MOD MDM 40: CPT

## 2020-02-21 NOTE — HISTORY OF PRESENT ILLNESS
[Other: ____] : [unfilled] [FreeTextEntry3] : f/u home visit [FreeTextEntry2] : patient on warfarin for recirremt DVT/ He had another PE when off the coumadin sp tjhis is chronic. he is stable. had a cva > 20 years ago, is confined to the house with extreme right sided hemiparesis.

## 2020-02-21 NOTE — REVIEW OF SYSTEMS
[Negative] : Heme/Lymph [FreeTextEntry2] : rosio [FreeTextEntry5] : says he takes his meds. rarely is bp checked [FreeTextEntry7] : denies [FreeTextEntry8] : denies [de-identified] : skin is markedly stretched on both lower extremities and will get a flare if he hits the skin [de-identified] : dense hemipareis, able t get out of  bed to move to his couch/ on seozire ,eds

## 2020-02-21 NOTE — ASSESSMENT
[FreeTextEntry1] : Ge is stable. on meds. seemingly content to be at home. has refused PT and a more active life style

## 2020-02-21 NOTE — PHYSICAL EXAM
[Normal Sclera/Conjunctiva] : normal sclera/conjunctiva [Normal Outer Ear/Nose] : the outer ears and nose were normal in appearance [No JVD] : no jugular venous distention [Normal Rate] : normal rate  [Regular Rhythm] : with a regular rhythm [de-identified] : alert  laying on his couch in nad [de-identified] : clear [de-identified] : had swollen non edematous right lower leg to mid thigh and swelling left leg to mid calf. both had chronic brownish discoloaration. there is a healing round area jof redness of left ant calf. the skin on toes massively swollen R>Lpaulses fetl. healing wound on toes on right foot [FreeTextEntry1] : def [de-identified] : def [de-identified] : able to move LLE and borth UE, left better than right

## 2020-02-24 ENCOUNTER — RX RENEWAL (OUTPATIENT)
Age: 70
End: 2020-02-24

## 2020-02-24 RX ORDER — CARBAMAZEPINE 400 MG/1
400 TABLET, EXTENDED RELEASE ORAL TWICE DAILY
Qty: 180 | Refills: 4 | Status: ACTIVE | COMMUNITY
Start: 2018-12-20 | End: 1900-01-01

## 2020-02-24 RX ORDER — WARFARIN 10 MG/1
10 TABLET ORAL DAILY
Qty: 180 | Refills: 4 | Status: ACTIVE | COMMUNITY
Start: 2019-01-04 | End: 1900-01-01

## 2020-03-13 ENCOUNTER — LABORATORY RESULT (OUTPATIENT)
Age: 70
End: 2020-03-13

## 2020-03-24 ENCOUNTER — LABORATORY RESULT (OUTPATIENT)
Age: 70
End: 2020-03-24

## 2020-04-21 ENCOUNTER — LABORATORY RESULT (OUTPATIENT)
Age: 70
End: 2020-04-21

## 2020-05-02 ENCOUNTER — LABORATORY RESULT (OUTPATIENT)
Age: 70
End: 2020-05-02

## 2020-05-13 RX ORDER — LEVETIRACETAM 500 MG/1
500 TABLET, FILM COATED ORAL
Qty: 180 | Refills: 1 | Status: ACTIVE | COMMUNITY
Start: 2018-12-20 | End: 1900-01-01

## 2020-05-13 RX ORDER — ATORVASTATIN CALCIUM 80 MG/1
80 TABLET, FILM COATED ORAL
Qty: 90 | Refills: 0 | Status: ACTIVE | COMMUNITY
Start: 2018-12-20 | End: 1900-01-01

## 2020-05-13 RX ORDER — FUROSEMIDE 40 MG/1
40 TABLET ORAL
Qty: 90 | Refills: 0 | Status: ACTIVE | COMMUNITY
Start: 2018-12-20 | End: 1900-01-01

## 2020-05-19 LAB
INR PPP: 3.42 RATIO
PT BLD: 40.8 SEC

## 2020-05-22 ENCOUNTER — LABORATORY RESULT (OUTPATIENT)
Age: 70
End: 2020-05-22

## 2020-05-26 DIAGNOSIS — Z79.01 LONG TERM (CURRENT) USE OF ANTICOAGULANTS: ICD-10-CM

## 2020-05-26 DIAGNOSIS — Z79.01 ENCOUNTER FOR THERAPEUTIC DRUG LVL MONITORING: ICD-10-CM

## 2020-05-26 DIAGNOSIS — Z51.81 ENCOUNTER FOR THERAPEUTIC DRUG LVL MONITORING: ICD-10-CM

## 2020-05-27 ENCOUNTER — LABORATORY RESULT (OUTPATIENT)
Age: 70
End: 2020-05-27

## 2020-06-16 ENCOUNTER — LABORATORY RESULT (OUTPATIENT)
Age: 70
End: 2020-06-16

## 2020-06-19 ENCOUNTER — LABORATORY RESULT (OUTPATIENT)
Age: 70
End: 2020-06-19

## 2020-07-14 ENCOUNTER — LABORATORY RESULT (OUTPATIENT)
Age: 70
End: 2020-07-14

## 2020-07-17 ENCOUNTER — LABORATORY RESULT (OUTPATIENT)
Age: 70
End: 2020-07-17

## 2020-07-23 ENCOUNTER — RX RENEWAL (OUTPATIENT)
Age: 70
End: 2020-07-23

## 2020-07-24 ENCOUNTER — LABORATORY RESULT (OUTPATIENT)
Age: 70
End: 2020-07-24

## 2020-08-11 ENCOUNTER — LABORATORY RESULT (OUTPATIENT)
Age: 70
End: 2020-08-11

## 2020-08-13 ENCOUNTER — NON-APPOINTMENT (OUTPATIENT)
Age: 70
End: 2020-08-13

## 2021-06-21 ENCOUNTER — RX RENEWAL (OUTPATIENT)
Age: 71
End: 2021-06-21